# Patient Record
Sex: MALE | Race: WHITE | NOT HISPANIC OR LATINO | Employment: OTHER | URBAN - METROPOLITAN AREA
[De-identification: names, ages, dates, MRNs, and addresses within clinical notes are randomized per-mention and may not be internally consistent; named-entity substitution may affect disease eponyms.]

---

## 2017-03-24 LAB
CHOLEST SERPL-MCNC: NORMAL MG/DL
CHOLEST/HDLC SERPL: NORMAL {RATIO}
HDLC SERPL-MCNC: NORMAL MG/DL
NON-HDL-CHOL (CHOL-HDL) (HISTORICAL): NORMAL
TRIGL SERPL-MCNC: NORMAL MG/DL

## 2017-03-27 ENCOUNTER — ALLSCRIPTS OFFICE VISIT (OUTPATIENT)
Dept: OTHER | Facility: OTHER | Age: 52
End: 2017-03-27

## 2017-04-06 ENCOUNTER — HOSPITAL ENCOUNTER (OUTPATIENT)
Dept: SLEEP CENTER | Facility: CLINIC | Age: 52
Discharge: HOME | End: 2017-04-06
Payer: COMMERCIAL

## 2017-04-06 ENCOUNTER — TRANSCRIBE ORDERS (OUTPATIENT)
Dept: SLEEP CENTER | Facility: CLINIC | Age: 52
End: 2017-04-06

## 2017-04-06 DIAGNOSIS — G47.33 OBSTRUCTIVE SLEEP APNEA (ADULT) (PEDIATRIC): Primary | ICD-10-CM

## 2017-04-06 DIAGNOSIS — G47.33 OBSTRUCTIVE SLEEP APNEA (ADULT) (PEDIATRIC): ICD-10-CM

## 2017-10-17 ENCOUNTER — ALLSCRIPTS OFFICE VISIT (OUTPATIENT)
Dept: OTHER | Facility: OTHER | Age: 52
End: 2017-10-17

## 2017-10-17 DIAGNOSIS — K21.00 GASTRO-ESOPHAGEAL REFLUX DISEASE WITH ESOPHAGITIS: ICD-10-CM

## 2017-10-18 NOTE — PROGRESS NOTES
Assessment  1  Benign essential HTN (401 1) (I10)   2  Hyperglycemia (790 29) (R73 9)   3  Mixed hyperlipidemia (272 2) (E78 2)   4  Gastroesophageal reflux disease with esophagitis (530 11) (K21 0)    Plan  Depression screening    · *VB-Depression Screening; Status:Complete;   Done: 01MXY4358 09:59AM   · *VB-Depression Screening ; every 1 year; Last 06LBN0355; Next 00QTT3446; Status:Active  Gastroesophageal reflux disease with esophagitis    · (1) ALT (SGPT); Status:Active; Requested for:17Oct2017;    · (1) AST (SGOT); Status:Active; Requested for:17Oct2017;    · (1) BASIC METABOLIC PROFILE; Status:Active; Requested for:17Oct2017;    · (1) CBC/ PLT (NO DIFF); Status:Active; Requested for:17Oct2017;    · (Q) LIPID PANEL WITH DIRECT LDL; Status:Active; Requested CSI:[id number];    · Follow-up visit in 4 Months Evaluation and Treatment  Follow-up  Status: Hold For - Scheduling   Requested for: 10XMZ1249    Discussion/Summary  Discussion Summary:   See med list ordered  Counseling Documentation With Imm: The patient was counseled regarding instructions for management,-- risk factor reductions,-- prognosis  Chief Complaint  Chief Complaint Free Text Note Form: pt here for 4 month f/u of HTNstates he had a script for Omeprazole 20 mg but not seeing any orders      History of Present Illness  Hypertension (Follow-Up): Symptoms: denies impaired vision,-- denies dyspnea,-- denies chest pain,-- denies intermittent leg claudication-- and-- denies lower extremity edema  Associated symptoms include no headache-- and-- no memory loss  Home monitoring: The patient is not checking blood pressure at home  Hyperlipidemia (Follow-Up): The patient states his hyperlipidemia has been stable since the last visit  Comorbid Illnesses: hypertension  Symptoms: denies chest pain,-- denies intermittent leg claudication,-- denies muscle pain-- and-- denies muscle weakness   Associated symptoms include no focal neurologic deficits-- and-- no memory loss  Lifestyle: Diet: He does not have a healthy diet  Weight Issues: He has weight concerns  Weight control issues: overweight  Exercise: He does not exercise regularly  Smoking: He does not use tobacco Alcohol: He denies alcohol use  Drug Use: He denies drug use  Medications: the patient is adherent with his medication regimen  -- He denies medication side effects  The patient is doing well with his hyperlipidemia goals  the patient's last LDL was 93 mg/dL  The patient is due for a lipid panel  Gastroesophageal Reflux Disease (Follow-Up): The patient reports no change in the condition  The patient is currently asymptomatic  No associated symptoms are reported  Medications:  the patient is adherent to his medication regimen, but-- he denies medication side effects  Review of Systems  Complete-Male:   Constitutional: No fever or chills, feels well, no tiredness, no recent weight gain or weight loss  Eyes: No complaints of eye pain, no red eyes, no discharge from eyes, no itchy eyes  ENT: no complaints of earache, no hearing loss, no nosebleeds, no nasal discharge, no sore throat, no hoarseness  Cardiovascular: No complaints of slow heart rate, no fast heart rate, no chest pain, no palpitations, no leg claudication, no lower extremity  Respiratory: No complaints of shortness of breath, no wheezing, no cough, no SOB on exertion, no orthopnea or PND  Gastrointestinal: as noted in HPI  Musculoskeletal: No complaints of arthralgia, no myalgias, no joint swelling or stiffness, no limb pain or swelling  Neurological: No compliants of headache, no confusion, no convulsions, no numbness or tingling, no dizziness or fainting, no limb weakness, no difficulty walking  Psychiatric: snoring, but-- Is not suicidal, no sleep disturbances, no anxiety or depression, no change in personality, no emotional problems     Endocrine: No complaints of proptosis, no hot flashes, no muscle weakness, no erectile dysfunction, no deepening of the voice, no feelings of weakness  Hematologic/Lymphatic: No complaints of swollen glands, no swollen glands in the neck, does not bleed easily, no easy bruising  PHQ-9 Depression Scale: Over the past 2 weeks, how often have you been bothered by the following problems? 1 ) Little interest or pleasure in doing things? Not at all    2 ) Feeling down, depressed or hopeless? Not at all    3 ) Trouble falling asleep or sleeping too much? Not at all    4 ) Feeling tired or having little energy? Not at all    5 ) Poor appetite or overeating? Not at all    6 ) Feeling bad about yourself, or that you are a failure, or have let yourself or your family down? Not at all    7 ) Trouble concentrating on things, such as reading a newspaper or watching television? Not at all    8 ) Moving or speaking so slowly that other people could have noticed, or the opposite, moving or speaking faster than usual? Not at all  How difficult have these problems made it for you to do your work, take care of things at home, or get along with people? Not at all  Score 0      Active Problems  1  Benign essential HTN (401 1) (I10)   2  Diverticulosis large intestine w/o perforation or abscess w/o bleeding (562 10) (K57 30)   3  Gastroesophageal reflux disease with esophagitis (530 11) (K21 0)   4  Hyperglycemia (790 29) (R73 9)   5  Mixed hyperlipidemia (272 2) (E78 2)   6  Obstructive sleep apnea (327 23) (G47 33)    Past Medical History  1  History of High cholesterol (272 0) (E78 00)   2  History of asthma (V12 69) (Z87 09)   3  History of esophageal reflux (V12 79) (Z87 19)   4  History of Obstructive sleep apnea (327 23) (G47 33)   5  History of RLQ discomfort (789 03) (R10 31)  Active Problems And Past Medical History Reviewed: The active problems and past medical history were reviewed and updated today  Surgical History  Surgical History Reviewed:    The surgical history was reviewed and updated today  Family History  Mother    1  Family history of hypertension (V17 49) (Z82 49)  Father    2  Family history of malignant neoplasm (V16 9) (Z80 9)  Family History Reviewed: The family history was reviewed and updated today  Social History   · Alcohol use (V49 89) (Z78 9)   · Never a smoker   · No drug use  Social History Reviewed: The social history was reviewed and updated today  Current Meds   1  Atorvastatin Calcium 20 MG Oral Tablet; TAKE 1 TABLET DAILY; Therapy: 61WZF8914 to (Evaluate:11Mar2018)  Requested for: 01Pch8573; Last Rx:73Fgw9700   Ordered  Medication List Reviewed: The medication list was reviewed and updated today  Allergies  1  Seasonal    Vitals  Vital Signs    Recorded: 72KSE6217 09:53AM   Temperature 98 9 F, Tympanic   Heart Rate 60   Systolic 331, LUE, Sitting   Diastolic 82, LUE, Sitting   Height 6 ft 1 in   Weight 252 lb 8 oz   BMI Calculated 33 31   BSA Calculated 2 38   O2 Saturation 98, RA     Physical Exam    Constitutional   General appearance: Abnormal   overweight  Eyes   Conjunctiva and lids: No swelling, erythema, or discharge  Ears, Nose, Mouth, and Throat   External inspection of ears and nose: Normal     Otoscopic examination: Tympanic membrance translucent with normal light reflex  Canals patent without erythema  Oropharynx: Normal with no erythema, edema, exudate or lesions  Pulmonary   Respiratory effort: No increased work of breathing or signs of respiratory distress  Auscultation of lungs: Clear to auscultation, equal breath sounds bilaterally, no wheezes, no rales, no rhonci  Cardiovascular   Auscultation of heart: Normal rate and rhythm, normal S1 and S2, without murmurs  Examination of extremities for edema and/or varicosities: Normal     Carotid pulses: Normal     Abdomen   Abdomen: Non-tender, no masses  Liver and spleen: No hepatomegaly or splenomegaly      Musculoskeletal   Gait and station: Normal  Digits and nails: Normal without clubbing or cyanosis  Skin   Skin and subcutaneous tissue: Normal without rashes or lesions  Neurologic   Cranial nerves: Cranial nerves 2-12 intact  Reflexes: 2+ and symmetric  Sensation: No sensory loss  Psychiatric   Orientation to person, place and time: Normal          Results/Data  *VB-Depression Screening 87GRL9937 09:59AM Sriram Gill     Test Name Result Flag Reference   Depression Scale Result      Depression Screen - Negative For Symptoms       Health Management  Depression screening   *VB-Depression Screening; every 1 year; Last 95JLY9634; Next Due: 80MLH3712; Active  History of Colon cancer screening   COLONOSCOPY; every 5 years; Last 60JVB9738; Next Due: 01KTB7376; Active    Signatures   Electronically signed by :  Raymond Hickman MD; Oct 17 2017 10:21AM EST                       (Author)

## 2018-01-13 VITALS
OXYGEN SATURATION: 98 % | TEMPERATURE: 98.9 F | SYSTOLIC BLOOD PRESSURE: 122 MMHG | HEIGHT: 73 IN | BODY MASS INDEX: 33.46 KG/M2 | DIASTOLIC BLOOD PRESSURE: 82 MMHG | WEIGHT: 252.5 LBS | HEART RATE: 60 BPM

## 2018-01-14 VITALS
OXYGEN SATURATION: 96 % | DIASTOLIC BLOOD PRESSURE: 80 MMHG | SYSTOLIC BLOOD PRESSURE: 128 MMHG | WEIGHT: 257.38 LBS | HEIGHT: 61 IN | TEMPERATURE: 98.1 F | BODY MASS INDEX: 48.6 KG/M2 | HEART RATE: 75 BPM

## 2018-03-08 DIAGNOSIS — E78.5 HYPERLIPIDEMIA, UNSPECIFIED HYPERLIPIDEMIA TYPE: Primary | ICD-10-CM

## 2018-03-08 RX ORDER — ATORVASTATIN CALCIUM 20 MG/1
1 TABLET, FILM COATED ORAL DAILY
COMMUNITY
Start: 2016-02-29 | End: 2018-03-08 | Stop reason: SDUPTHER

## 2018-03-08 RX ORDER — ATORVASTATIN CALCIUM 20 MG/1
20 TABLET, FILM COATED ORAL DAILY
Qty: 30 TABLET | Refills: 0 | Status: SHIPPED | OUTPATIENT
Start: 2018-03-08 | End: 2018-03-16 | Stop reason: SDUPTHER

## 2018-03-16 DIAGNOSIS — E78.5 HYPERLIPIDEMIA, UNSPECIFIED HYPERLIPIDEMIA TYPE: ICD-10-CM

## 2018-03-16 DIAGNOSIS — K21.9 GASTROESOPHAGEAL REFLUX DISEASE, ESOPHAGITIS PRESENCE NOT SPECIFIED: Primary | ICD-10-CM

## 2018-03-16 RX ORDER — OMEPRAZOLE 20 MG/1
20 CAPSULE, DELAYED RELEASE ORAL DAILY
Qty: 90 CAPSULE | Refills: 0 | Status: SHIPPED | OUTPATIENT
Start: 2018-03-16 | End: 2021-11-15 | Stop reason: SDUPTHER

## 2018-03-16 RX ORDER — ATORVASTATIN CALCIUM 20 MG/1
20 TABLET, FILM COATED ORAL DAILY
Qty: 90 TABLET | Refills: 0 | Status: SHIPPED | OUTPATIENT
Start: 2018-03-16 | End: 2018-06-12 | Stop reason: SDUPTHER

## 2018-05-22 ENCOUNTER — OFFICE VISIT (OUTPATIENT)
Dept: SLEEP CENTER | Facility: CLINIC | Age: 53
End: 2018-05-22
Payer: COMMERCIAL

## 2018-05-22 VITALS
WEIGHT: 265 LBS | HEIGHT: 73 IN | HEART RATE: 72 BPM | SYSTOLIC BLOOD PRESSURE: 128 MMHG | DIASTOLIC BLOOD PRESSURE: 72 MMHG | BODY MASS INDEX: 35.12 KG/M2

## 2018-05-22 DIAGNOSIS — E66.9 OBESITY (BMI 35.0-39.9 WITHOUT COMORBIDITY): ICD-10-CM

## 2018-05-22 DIAGNOSIS — G47.33 OBSTRUCTIVE SLEEP APNEA: Primary | ICD-10-CM

## 2018-05-22 PROCEDURE — 99214 OFFICE O/P EST MOD 30 MIN: CPT | Performed by: PSYCHIATRY & NEUROLOGY

## 2018-05-22 NOTE — PATIENT INSTRUCTIONS
1   Continue nasal CPAP at 12 cm water pressure  2  Obtain new supplies to last over the next year  3  Return to the Sleep [address] [place] [address] in approximately 12 months for re-evaluation and dispense new supplies   4    Attempt weight loss as possible using combination of diet and exercise

## 2018-05-22 NOTE — PROGRESS NOTES
Progress Note - Idris [age] y o  male   :1965, MRN: [id number]  2018          Follow Up Evaluation / Problem:     Obstructive Sleep Apnea  Obesity    HPI: Chase Serrato is a 48y o  year old male  He has been using nasal CPAP consistently since originally diagnosed  Last study was a treatment report completed on 2016  He has been using 12 cm of water pressure  Review of Systems      Genitourinary none   Cardiology none   Gastrointestinal none   Neurology awaken with headache   Constitutional none   Integumentary none   Psychiatry none   Musculoskeletal joint pain, muscle aches, legs twitching/jerking and leg cramps   Pulmonary none   ENT throat clearing   Endocrine none   Hematological none       Current Outpatient Prescriptions:     atorvastatin (LIPITOR) 20 mg tablet, Take 1 tablet (20 mg total) by mouth daily, Disp: 90 tablet, Rfl: 0    omeprazole (PriLOSEC) 20 mg delayed release capsule, Take 1 capsule (20 mg total) by mouth daily, Disp: 90 capsule, Rfl: 0    [place] Sleepiness Scale  Sitting and reading: Would never doze  Watching TV: Slight chance of dozing  Sitting, inactive in a public place (e g  a theatre or a meeting): Slight chance of dozing  As a passenger in a car for an hour without a break: Slight chance of dozing  Lying down to rest in the afternoon when circumstances permit: Slight chance of dozing  Sitting and talking to someone: Would never doze  Sitting quietly after a lunch without alcohol: Would never doze  In a car, while stopped for a few minutes in traffic: Would never doze  Total score: 4              Vitals:    18 1000   BP: 128/72   Pulse: 72   Weight: 120 kg (265 lb)   Height: 6' 1" (1 854 m)       Body mass index is 34 96 kg/m²      Neck Circumference: 18 5 (inches)       EPWORTH SLEEPINESS SCORE  Total score: 4      Past History Since Last Sleep Center Visit:     Over the past year he has gained somewhere between 10 and 15 lb  He continues to use nasal CPAP on a consistent basis and reports good results  Unfortunately, new supplies have not been obtained since his last visit  The review of systems and following portions of the patient's history were reviewed and updated as appropriate: allergies, current medications, past family history, past medical history, past social history, past surgical history, and problem list     OBJECTIVE    PAP Pressure: Nasal CPAP set to deliver 12 cm of water pressure  DME Provider: [facility]    Physical Exam:     General Appearance:   Alert, cooperative, no distress, appears stated age, mildly obese     Head:   Normocephalic, without obvious abnormality, atraumatic     Eyes:   PERRL, conjunctiva/corneas clear, EOM's intact          Nose:  Nares normal, septum midline, no drainage or sinus tenderness           Throat:  Lips, teeth and gums normal; tongue normal size and  shape and midline        Neck:  Supple, symmetrical, trachea midline, no adenopathy; Thyroid: No enlargement, tenderness or nodules; no carotid bruit or JVD     Lungs:      Clear to auscultation bilaterally, respirations unlabored     Heart:   Regular rate and rhythm, S1 and S2 normal, no murmur, rub or gallop       Extremities:  Extremities normal, atraumatic, no cyanosis or edema     Pulses:  2+ and symmetric all extremities     Skin:  Skin color, texture, turgor normal, no rashes or lesions       Neurologic:  CNII-XII intact  Normal strength, sensation throughout       ASSESSMENT / PLAN    1  Obstructive sleep apnea  Sleep F/U  - established patient   2  Obesity (BMI 35 0-39 9 without comorbidity)             Counseling / Coordination of Care  Total clinic time spent today 25 minutes  Greater than 50% of total time was spent with the patient and / or family counseling and / or coordination of care       A description of the counseling / coordination of care:     Impressions, Diagnostic results, Prognosis, Instructions for management, Risks and benefits of treatment, Patient and family education, Risk factor reductions and Importance of compliance with treatment    The following instructions have been given to the patient today:    Today we discussed the patient's ongoing use of nasal PAP  He uses the equipment more than 4 hours per night and reports continued clinical benefit  He will continue to use this equipment nightly as tolerated for as may hours as possible  I will also provide a prescription for replacement supplies which will enable the patient to obtain these over the next 12 months  We also reviewed the mechanism of narrowing and closure of the upper airway and the ultimate pathology of Obstructive Sleep Apnea  He will return in 1 year for a routine  re-evaluation  I have encouraged him to contact the [address] if any problems arise prior to that time  Patient Instructions   1  Continue nasal CPAP at 12 cm water pressure  2  Obtain new supplies to last over the next year  3  Return to the [address] in approximately 12 months for re-evaluation and dispense new supplies   4    Attempt weight loss as possible using combination of diet and exercise        DO Kevin Mueller 15

## 2018-06-11 ENCOUNTER — OFFICE VISIT (OUTPATIENT)
Dept: INTERNAL MEDICINE CLINIC | Facility: CLINIC | Age: 53
End: 2018-06-11
Payer: COMMERCIAL

## 2018-06-11 VITALS
TEMPERATURE: 97.8 F | HEART RATE: 70 BPM | SYSTOLIC BLOOD PRESSURE: 120 MMHG | RESPIRATION RATE: 20 BRPM | BODY MASS INDEX: 34.91 KG/M2 | WEIGHT: 263.4 LBS | HEIGHT: 73 IN | DIASTOLIC BLOOD PRESSURE: 86 MMHG | OXYGEN SATURATION: 95 %

## 2018-06-11 DIAGNOSIS — Z12.11 SCREENING FOR COLON CANCER: Primary | ICD-10-CM

## 2018-06-11 DIAGNOSIS — E78.2 MIXED HYPERLIPIDEMIA: ICD-10-CM

## 2018-06-11 DIAGNOSIS — E66.9 OBESITY (BMI 35.0-39.9 WITHOUT COMORBIDITY): ICD-10-CM

## 2018-06-11 DIAGNOSIS — G47.33 OBSTRUCTIVE SLEEP APNEA: ICD-10-CM

## 2018-06-11 PROCEDURE — 99214 OFFICE O/P EST MOD 30 MIN: CPT | Performed by: INTERNAL MEDICINE

## 2018-06-11 NOTE — PROGRESS NOTES
Assessment/Plan:      1  Hyperlipidemia   lipid profile is in good range  Continue with atorvastatin 20 mg daily     2  Obstructive sleep apnea   doing well on CPAP  Continue CPAP machine  3  Obesity   again advised for diet exercise and lower the calorie intake  Diagnoses and all orders for this visit:    Screening for colon cancer  -     Ambulatory referral to Gastroenterology; Future    Obstructive sleep apnea    Obesity (BMI 35 0-39 9 without comorbidity)    Mixed hyperlipidemia          Subjective:          Patient ID: Jacinda Huertas is a 48 y o  male  Patient is here for regular follow-up to the office  Denied any new complaints  Labs were drawn last week  The following portions of the patient's history were reviewed and updated as appropriate: allergies, current medications, past family history, past medical history, past social history, past surgical history and problem list     Review of Systems   Constitutional: Negative for fatigue and fever  HENT: Negative for congestion, ear discharge, ear pain, postnasal drip, sinus pressure, sore throat, tinnitus and trouble swallowing  Eyes: Negative for discharge, itching and visual disturbance  Respiratory: Negative for cough and shortness of breath  Cardiovascular: Negative for chest pain and palpitations  Gastrointestinal: Negative for abdominal pain, diarrhea, nausea and vomiting  Endocrine: Negative for cold intolerance and polyuria  Genitourinary: Negative for difficulty urinating, dysuria and urgency  Musculoskeletal: Negative for arthralgias and neck pain  Skin: Negative for rash  Allergic/Immunologic: Negative for environmental allergies  Neurological: Negative for dizziness, weakness and headaches  Psychiatric/Behavioral: Negative for agitation and behavioral problems  The patient is not nervous/anxious            Past Medical History:   Diagnosis Date    High cholesterol     Obstructive sleep apnea Last Assessed:6/8/16         Current Outpatient Prescriptions:     atorvastatin (LIPITOR) 20 mg tablet, Take 1 tablet (20 mg total) by mouth daily, Disp: 90 tablet, Rfl: 0    omeprazole (PriLOSEC) 20 mg delayed release capsule, Take 1 capsule (20 mg total) by mouth daily, Disp: 90 capsule, Rfl: 0    Allergies   Allergen Reactions    Seasonal Ic  [Cholestatin]        Social History   History reviewed  No pertinent surgical history  Family History   Problem Relation Age of Onset    Hypertension Mother     Cancer Father        Objective:  /86 (BP Location: Left arm, Patient Position: Sitting, Cuff Size: Large)   Pulse 70   Temp 97 8 °F (36 6 °C) (Oral)   Resp 20   Ht 6' 1" (1 854 m)   Wt 119 kg (263 lb 6 4 oz)   SpO2 95% Comment: room air  BMI 34 75 kg/m²   Body mass index is 34 75 kg/m²  Physical Exam   Constitutional: He appears well-developed  HENT:   Head: Normocephalic  Right Ear: External ear normal    Left Ear: External ear normal    Mouth/Throat: Oropharynx is clear and moist    Eyes: Pupils are equal, round, and reactive to light  No scleral icterus  Neck: Normal range of motion  Neck supple  No tracheal deviation present  No thyromegaly present  Cardiovascular: Normal rate, regular rhythm and normal heart sounds  No murmur heard  Pulmonary/Chest: Effort normal and breath sounds normal  No respiratory distress  He exhibits no tenderness  Abdominal: Soft  Bowel sounds are normal  He exhibits no mass  There is no tenderness  Musculoskeletal: Normal range of motion  Lymphadenopathy:     He has no cervical adenopathy  Neurological: He is alert  No cranial nerve deficit  Skin: Skin is warm  Psychiatric: He has a normal mood and affect   His behavior is normal

## 2018-06-12 DIAGNOSIS — E78.5 HYPERLIPIDEMIA, UNSPECIFIED HYPERLIPIDEMIA TYPE: ICD-10-CM

## 2018-06-12 RX ORDER — ATORVASTATIN CALCIUM 20 MG/1
20 TABLET, FILM COATED ORAL DAILY
Qty: 90 TABLET | Refills: 1 | Status: SHIPPED | OUTPATIENT
Start: 2018-06-12 | End: 2018-11-07 | Stop reason: SDUPTHER

## 2018-08-01 ENCOUNTER — OFFICE VISIT (OUTPATIENT)
Dept: URGENT CARE | Age: 53
End: 2018-08-01
Payer: COMMERCIAL

## 2018-08-01 VITALS
SYSTOLIC BLOOD PRESSURE: 120 MMHG | HEIGHT: 73 IN | DIASTOLIC BLOOD PRESSURE: 77 MMHG | OXYGEN SATURATION: 97 % | TEMPERATURE: 98.1 F | WEIGHT: 261.2 LBS | BODY MASS INDEX: 34.62 KG/M2 | RESPIRATION RATE: 16 BRPM | HEART RATE: 68 BPM

## 2018-08-01 DIAGNOSIS — S01.81XA LACERATION OF FOREHEAD, INITIAL ENCOUNTER: Primary | ICD-10-CM

## 2018-08-01 PROCEDURE — 99213 OFFICE O/P EST LOW 20 MIN: CPT | Performed by: PHYSICIAN ASSISTANT

## 2018-08-01 NOTE — PATIENT INSTRUCTIONS
Keep wound covered for 24 hours then change bandage 2 times per day  Keep clean, dry and apply topical antibiotic ointment  Tylenol or Ibuprofen for pain as needed  Have wound checked in 1-2 days  Watch for signs of infection  Follow up with PCP in 3-5 days  Go to ED if symptoms worsen    Laceration   WHAT YOU NEED TO KNOW:   A laceration is an injury to the skin and the soft tissue underneath it  Lacerations happen when you are cut or hit by something  They can happen anywhere on the body  DISCHARGE INSTRUCTIONS:   Return to the emergency department if:   · You have heavy bleeding or bleeding that does not stop after 10 minutes of holding firm, direct pressure over the wound  · Your wound opens up  Contact your healthcare provider if:   · You have a fever or chills  · Your laceration is red, warm, or swollen  · You have red streaks on your skin coming from your wound  · You have white or yellow drainage from the wound that smells bad  · You have pain that gets worse, even after treatment  · You have questions or concerns about your condition or care  Medicines:   · Prescription pain medicine  may be given  Ask how to take this medicine safely  · Antibiotics  help treat or prevent a bacterial infection  · Take your medicine as directed  Contact your healthcare provider if you think your medicine is not helping or if you have side effects  Tell him or her if you are allergic to any medicine  Keep a list of the medicines, vitamins, and herbs you take  Include the amounts, and when and why you take them  Bring the list or the pill bottles to follow-up visits  Carry your medicine list with you in case of an emergency  Care for your wound as directed:   · Do not get your wound wet  until your healthcare provider says it is okay  Do not soak your wound in water  Do not go swimming until your healthcare provider says it is okay  Carefully wash the wound with soap and water   Gently pat the area dry or allow it to air dry  · Change your bandages  when they get wet, dirty, or after washing  Apply new, clean bandages as directed  Do not apply elastic bandages or tape too tight  Do not put powders or lotions over your incision  · Apply antibiotic ointment as directed  Your healthcare provider may give you antibiotic ointment to put over your wound if you have stitches  If you have strips of tape over your incision, let them dry up and fall off on their own  If they do not fall off within 14 days, gently remove them  If you have glue over your wound, do not remove or pick at it  If your glue comes off, do not replace it with glue that you have at home  · Check your wound every day for signs of infection such as swelling, redness, or pus  Self-care:   · Apply ice  on your wound for 15 to 20 minutes every hour or as directed  Use an ice pack, or put crushed ice in a plastic bag  Cover it with a towel  Ice helps prevent tissue damage and decreases swelling and pain  · Use a splint as directed  A splint will decrease movement and stress on your wound  It may help it heal faster  A splint may be used for lacerations over joints or areas of your body that bend  Ask your healthcare provider how to apply and remove a splint  · Decrease scarring of your wound  by applying ointments as directed  Do not apply ointments until your healthcare provider says it is okay  You may need to wait until your wound is healed  Ask which ointment to buy and how often to use it  After your wound is healed, use sunscreen over the area when you are out in the sun  You should do this for at least 6 months to 1 year after your injury  Follow up with your healthcare provider as directed: You may need to follow up in 24 to 48 hours to have your wound checked for infection  You will need to return in 3 to 14 days if you have stitches or staples so they can be removed   Care for your wound as directed to prevent infection and help it heal  Write down your questions so you remember to ask them during your visits  © 2017 [employer] [employer] Information is for End User's use only and may not be sold, redistributed or otherwise used for commercial purposes  All illustrations and images included in CareNotes® are the copyrighted property of [employer]  or [employer]  The above information is an  only  It is not intended as medical advice for individual conditions or treatments  Talk to your doctor, nurse or pharmacist before following any medical regimen to see if it is safe and effective for you

## 2018-11-07 ENCOUNTER — OFFICE VISIT (OUTPATIENT)
Dept: INTERNAL MEDICINE CLINIC | Facility: CLINIC | Age: 53
End: 2018-11-07
Payer: COMMERCIAL

## 2018-11-07 VITALS
SYSTOLIC BLOOD PRESSURE: 130 MMHG | TEMPERATURE: 97.9 F | DIASTOLIC BLOOD PRESSURE: 84 MMHG | WEIGHT: 262.8 LBS | HEIGHT: 72 IN | OXYGEN SATURATION: 96 % | HEART RATE: 88 BPM | BODY MASS INDEX: 35.59 KG/M2

## 2018-11-07 DIAGNOSIS — G47.33 OBSTRUCTIVE SLEEP APNEA: Primary | ICD-10-CM

## 2018-11-07 DIAGNOSIS — E78.2 MIXED HYPERLIPIDEMIA: ICD-10-CM

## 2018-11-07 DIAGNOSIS — E78.5 HYPERLIPIDEMIA, UNSPECIFIED HYPERLIPIDEMIA TYPE: ICD-10-CM

## 2018-11-07 DIAGNOSIS — K21.9 GASTROESOPHAGEAL REFLUX DISEASE WITHOUT ESOPHAGITIS: ICD-10-CM

## 2018-11-07 PROCEDURE — 99214 OFFICE O/P EST MOD 30 MIN: CPT | Performed by: INTERNAL MEDICINE

## 2018-11-07 PROCEDURE — 1036F TOBACCO NON-USER: CPT | Performed by: INTERNAL MEDICINE

## 2018-11-07 PROCEDURE — 3008F BODY MASS INDEX DOCD: CPT | Performed by: INTERNAL MEDICINE

## 2018-11-07 RX ORDER — ATORVASTATIN CALCIUM 20 MG/1
20 TABLET, FILM COATED ORAL DAILY
Qty: 90 TABLET | Refills: 1 | Status: SHIPPED | OUTPATIENT
Start: 2018-11-07 | End: 2019-05-28 | Stop reason: SDUPTHER

## 2018-11-07 NOTE — PROGRESS NOTES
Assessment/Plan:    1  Hyperlipidemia  Will continue with atorvastatin 20 mg daily  Will check lipid profile before next visit  2  Gastroesophageal reflux disease  Doing well on omeprazole 20 mg daily  He did try to stop it but then have symptoms of reflux  So will continue it  3  Obstructive sleep apnea  Continue with the CPAP      Diagnoses and all orders for this visit:    Obstructive sleep apnea    Hyperlipidemia, unspecified hyperlipidemia type  -     atorvastatin (LIPITOR) 20 mg tablet; Take 1 tablet (20 mg total) by mouth daily  -     Lipid Panel with Direct LDL reflex; Future    Mixed hyperlipidemia    Gastroesophageal reflux disease without esophagitis          Subjective:          Patient ID: Dashawn Toney is a 48 y o  male  Hyperlipidemia   This is a chronic problem  The problem is controlled  Recent lipid tests were reviewed and are normal  Pertinent negatives include no chest pain or shortness of breath  Current antihyperlipidemic treatment includes statins  The current treatment provides significant improvement of lipids  Compliance problems include adherence to exercise  Risk factors for coronary artery disease include dyslipidemia, male sex and obesity  The following portions of the patient's history were reviewed and updated as appropriate: allergies, current medications, past family history, past medical history, past social history, past surgical history and problem list     Review of Systems   Constitutional: Negative for fatigue and fever  HENT: Negative for congestion, ear discharge, ear pain, postnasal drip, sinus pressure, sore throat, tinnitus and trouble swallowing  Eyes: Negative for discharge, itching and visual disturbance  Respiratory: Negative for cough and shortness of breath  Cardiovascular: Negative for chest pain and palpitations  Gastrointestinal: Negative for abdominal pain, diarrhea, nausea and vomiting     Endocrine: Negative for cold intolerance and polyuria  Genitourinary: Negative for difficulty urinating, dysuria and urgency  Musculoskeletal: Negative for arthralgias and neck pain  Skin: Negative for rash  Allergic/Immunologic: Negative for environmental allergies  Neurological: Negative for dizziness, weakness and headaches  Psychiatric/Behavioral: Negative for agitation and behavioral problems  The patient is not nervous/anxious  Past Medical History:   Diagnosis Date    High cholesterol     Obstructive sleep apnea     Last Assessed:6/8/16         Current Outpatient Prescriptions:     atorvastatin (LIPITOR) 20 mg tablet, Take 1 tablet (20 mg total) by mouth daily, Disp: 90 tablet, Rfl: 1    omeprazole (PriLOSEC) 20 mg delayed release capsule, Take 1 capsule (20 mg total) by mouth daily, Disp: 90 capsule, Rfl: 0    Allergies   Allergen Reactions    Seasonal Ic  [Cholestatin]        Social History   History reviewed  No pertinent surgical history  Family History   Problem Relation Age of Onset    Hypertension Mother     Cancer Father        Objective:  /84 (BP Location: Left arm, Patient Position: Sitting, Cuff Size: Large)   Pulse 88   Temp 97 9 °F (36 6 °C) (Oral)   Ht 6' (1 829 m)   Wt 119 kg (262 lb 12 8 oz)   SpO2 96% Comment: room air  BMI 35 64 kg/m²   Body mass index is 35 64 kg/m²  Physical Exam   Constitutional: He appears well-developed  HENT:   Head: Normocephalic  Right Ear: External ear normal    Left Ear: External ear normal    Mouth/Throat: Oropharynx is clear and moist    Eyes: Pupils are equal, round, and reactive to light  No scleral icterus  Neck: Normal range of motion  Neck supple  No tracheal deviation present  No thyromegaly present  Cardiovascular: Normal rate, regular rhythm and normal heart sounds  Pulmonary/Chest: Effort normal and breath sounds normal  No respiratory distress  He exhibits no tenderness  Abdominal: Soft  Bowel sounds are normal  He exhibits no mass   There is no tenderness  Musculoskeletal: Normal range of motion  He exhibits no edema  Lymphadenopathy:     He has no cervical adenopathy  Neurological: He is alert  No cranial nerve deficit  Skin: Skin is warm  Psychiatric: He has a normal mood and affect

## 2019-05-21 ENCOUNTER — CLINICAL SUPPORT (OUTPATIENT)
Dept: INTERNAL MEDICINE CLINIC | Facility: CLINIC | Age: 54
End: 2019-05-21
Payer: COMMERCIAL

## 2019-05-21 DIAGNOSIS — E78.5 HYPERLIPIDEMIA, UNSPECIFIED HYPERLIPIDEMIA TYPE: Primary | ICD-10-CM

## 2019-05-21 LAB
CHOLEST SERPL-MCNC: 104 MG/DL (ref 50–200)
HDLC SERPL-MCNC: 39 MG/DL (ref 40–60)
LDLC SERPL CALC-MCNC: 54 MG/DL (ref 0–100)
TRIGL SERPL-MCNC: 56 MG/DL

## 2019-05-21 PROCEDURE — 80061 LIPID PANEL: CPT | Performed by: INTERNAL MEDICINE

## 2019-05-21 PROCEDURE — 36415 COLL VENOUS BLD VENIPUNCTURE: CPT

## 2019-05-23 ENCOUNTER — OFFICE VISIT (OUTPATIENT)
Dept: SLEEP CENTER | Facility: CLINIC | Age: 54
End: 2019-05-23
Payer: COMMERCIAL

## 2019-05-23 VITALS
HEART RATE: 74 BPM | DIASTOLIC BLOOD PRESSURE: 72 MMHG | SYSTOLIC BLOOD PRESSURE: 120 MMHG | WEIGHT: 249 LBS | BODY MASS INDEX: 33 KG/M2 | HEIGHT: 73 IN

## 2019-05-23 DIAGNOSIS — E66.9 OBESITY (BMI 35.0-39.9 WITHOUT COMORBIDITY): ICD-10-CM

## 2019-05-23 DIAGNOSIS — G47.33 OBSTRUCTIVE SLEEP APNEA: Primary | ICD-10-CM

## 2019-05-23 PROCEDURE — 99214 OFFICE O/P EST MOD 30 MIN: CPT | Performed by: PSYCHIATRY & NEUROLOGY

## 2019-05-28 ENCOUNTER — OFFICE VISIT (OUTPATIENT)
Dept: INTERNAL MEDICINE CLINIC | Facility: CLINIC | Age: 54
End: 2019-05-28
Payer: COMMERCIAL

## 2019-05-28 VITALS
OXYGEN SATURATION: 97 % | SYSTOLIC BLOOD PRESSURE: 110 MMHG | WEIGHT: 249.4 LBS | DIASTOLIC BLOOD PRESSURE: 78 MMHG | HEART RATE: 63 BPM | HEIGHT: 73 IN | TEMPERATURE: 97.8 F | BODY MASS INDEX: 33.05 KG/M2

## 2019-05-28 DIAGNOSIS — Z12.5 PROSTATE CANCER SCREENING: ICD-10-CM

## 2019-05-28 DIAGNOSIS — G47.33 OBSTRUCTIVE SLEEP APNEA: ICD-10-CM

## 2019-05-28 DIAGNOSIS — E66.9 OBESITY (BMI 35.0-39.9 WITHOUT COMORBIDITY): Primary | ICD-10-CM

## 2019-05-28 DIAGNOSIS — E78.5 HYPERLIPIDEMIA, UNSPECIFIED HYPERLIPIDEMIA TYPE: ICD-10-CM

## 2019-05-28 DIAGNOSIS — K21.9 GASTROESOPHAGEAL REFLUX DISEASE, ESOPHAGITIS PRESENCE NOT SPECIFIED: ICD-10-CM

## 2019-05-28 PROCEDURE — 3008F BODY MASS INDEX DOCD: CPT | Performed by: INTERNAL MEDICINE

## 2019-05-28 PROCEDURE — 99214 OFFICE O/P EST MOD 30 MIN: CPT | Performed by: INTERNAL MEDICINE

## 2019-05-28 PROCEDURE — 1036F TOBACCO NON-USER: CPT | Performed by: INTERNAL MEDICINE

## 2019-05-28 RX ORDER — ATORVASTATIN CALCIUM 20 MG/1
20 TABLET, FILM COATED ORAL DAILY
Qty: 90 TABLET | Refills: 1 | Status: SHIPPED | OUTPATIENT
Start: 2019-05-28 | End: 2019-11-26 | Stop reason: SDUPTHER

## 2019-11-18 DIAGNOSIS — E78.5 HYPERLIPIDEMIA, UNSPECIFIED HYPERLIPIDEMIA TYPE: ICD-10-CM

## 2019-11-18 RX ORDER — ATORVASTATIN CALCIUM 20 MG/1
TABLET, FILM COATED ORAL
Qty: 90 TABLET | Refills: 1 | OUTPATIENT
Start: 2019-11-18

## 2019-11-22 ENCOUNTER — CLINICAL SUPPORT (OUTPATIENT)
Dept: INTERNAL MEDICINE CLINIC | Facility: CLINIC | Age: 54
End: 2019-11-22

## 2019-11-22 DIAGNOSIS — E78.2 MIXED HYPERLIPIDEMIA: ICD-10-CM

## 2019-11-22 DIAGNOSIS — Z12.5 PROSTATE CANCER SCREENING: ICD-10-CM

## 2019-11-22 DIAGNOSIS — K21.9 GASTROESOPHAGEAL REFLUX DISEASE WITHOUT ESOPHAGITIS: Primary | ICD-10-CM

## 2019-11-23 LAB
BASOPHILS # BLD AUTO: 58 CELLS/UL (ref 0–200)
BASOPHILS NFR BLD AUTO: 1.1 %
BUN SERPL-MCNC: 20 MG/DL (ref 7–25)
BUN/CREAT SERPL: ABNORMAL (CALC) (ref 6–22)
CALCIUM SERPL-MCNC: 9.8 MG/DL (ref 8.6–10.3)
CHLORIDE SERPL-SCNC: 105 MMOL/L (ref 98–110)
CHOLEST SERPL-MCNC: 144 MG/DL
CHOLEST/HDLC SERPL: 3.3 (CALC)
CO2 SERPL-SCNC: 27 MMOL/L (ref 20–32)
CREAT SERPL-MCNC: 0.95 MG/DL (ref 0.7–1.33)
EOSINOPHIL # BLD AUTO: 101 CELLS/UL (ref 15–500)
EOSINOPHIL NFR BLD AUTO: 1.9 %
ERYTHROCYTE [DISTWIDTH] IN BLOOD BY AUTOMATED COUNT: 12.1 % (ref 11–15)
GLUCOSE SERPL-MCNC: 102 MG/DL (ref 65–99)
HCT VFR BLD AUTO: 43.7 % (ref 38.5–50)
HDLC SERPL-MCNC: 44 MG/DL
HGB BLD-MCNC: 14.8 G/DL (ref 13.2–17.1)
LDLC SERPL CALC-MCNC: 83 MG/DL (CALC)
LYMPHOCYTES # BLD AUTO: 1701 CELLS/UL (ref 850–3900)
LYMPHOCYTES NFR BLD AUTO: 32.1 %
MCH RBC QN AUTO: 30.3 PG (ref 27–33)
MCHC RBC AUTO-ENTMCNC: 33.9 G/DL (ref 32–36)
MCV RBC AUTO: 89.5 FL (ref 80–100)
MONOCYTES # BLD AUTO: 610 CELLS/UL (ref 200–950)
MONOCYTES NFR BLD AUTO: 11.5 %
NEUTROPHILS # BLD AUTO: 2830 CELLS/UL (ref 1500–7800)
NEUTROPHILS NFR BLD AUTO: 53.4 %
NONHDLC SERPL-MCNC: 100 MG/DL (CALC)
PLATELET # BLD AUTO: 229 THOUSAND/UL (ref 140–400)
PMV BLD REES-ECKER: 10.1 FL (ref 7.5–12.5)
POTASSIUM SERPL-SCNC: 5 MMOL/L (ref 3.5–5.3)
PSA SERPL-MCNC: 0.2 NG/ML
RBC # BLD AUTO: 4.88 MILLION/UL (ref 4.2–5.8)
SL AMB EGFR AFRICAN AMERICAN: 105 ML/MIN/1.73M2
SL AMB EGFR NON AFRICAN AMERICAN: 90 ML/MIN/1.73M2
SODIUM SERPL-SCNC: 142 MMOL/L (ref 135–146)
TRIGL SERPL-MCNC: 77 MG/DL
WBC # BLD AUTO: 5.3 THOUSAND/UL (ref 3.8–10.8)

## 2019-11-26 ENCOUNTER — OFFICE VISIT (OUTPATIENT)
Dept: INTERNAL MEDICINE CLINIC | Facility: CLINIC | Age: 54
End: 2019-11-26
Payer: COMMERCIAL

## 2019-11-26 VITALS
TEMPERATURE: 98.3 F | DIASTOLIC BLOOD PRESSURE: 76 MMHG | HEART RATE: 64 BPM | WEIGHT: 268.2 LBS | BODY MASS INDEX: 35.54 KG/M2 | OXYGEN SATURATION: 97 % | SYSTOLIC BLOOD PRESSURE: 110 MMHG | HEIGHT: 73 IN

## 2019-11-26 DIAGNOSIS — E78.5 HYPERLIPIDEMIA, UNSPECIFIED HYPERLIPIDEMIA TYPE: ICD-10-CM

## 2019-11-26 DIAGNOSIS — E78.2 MIXED HYPERLIPIDEMIA: ICD-10-CM

## 2019-11-26 DIAGNOSIS — R73.9 HYPERGLYCEMIA: ICD-10-CM

## 2019-11-26 DIAGNOSIS — E66.9 OBESITY (BMI 35.0-39.9 WITHOUT COMORBIDITY): ICD-10-CM

## 2019-11-26 DIAGNOSIS — K21.9 GASTROESOPHAGEAL REFLUX DISEASE WITHOUT ESOPHAGITIS: ICD-10-CM

## 2019-11-26 DIAGNOSIS — Z11.59 NEED FOR HEPATITIS C SCREENING TEST: Primary | ICD-10-CM

## 2019-11-26 DIAGNOSIS — G47.33 OBSTRUCTIVE SLEEP APNEA: ICD-10-CM

## 2019-11-26 PROCEDURE — 99214 OFFICE O/P EST MOD 30 MIN: CPT | Performed by: INTERNAL MEDICINE

## 2019-11-26 PROCEDURE — 1036F TOBACCO NON-USER: CPT | Performed by: INTERNAL MEDICINE

## 2019-11-26 RX ORDER — ATORVASTATIN CALCIUM 20 MG/1
20 TABLET, FILM COATED ORAL DAILY
Qty: 90 TABLET | Refills: 1 | Status: SHIPPED | OUTPATIENT
Start: 2019-11-26 | End: 2020-06-23 | Stop reason: SDUPTHER

## 2019-11-26 NOTE — PROGRESS NOTES
Assessment/Plan:    1  Hyperlipidemia  Lipid profile is in excellent range  Will continue with present dose of atorvastatin 20 mg daily  Continue with low-fat diet exercise and weight loss  2  Hyperglycemia  Will request hemoglobin A1c before next visit  Advised for low sugar/low-carbohydrate diet exercise and weight loss  3  Gastroesophageal reflux disease  Stable on omeprazole 20 mg daily  Advised to taper off medicine and see how does without medicine  Continue with better diet control  Diagnoses and all orders for this visit:    Need for hepatitis C screening test  -     Hepatitis C antibody    Hyperlipidemia, unspecified hyperlipidemia type  -     atorvastatin (LIPITOR) 20 mg tablet; Take 1 tablet (20 mg total) by mouth daily    Gastroesophageal reflux disease without esophagitis    Obstructive sleep apnea    Obesity (BMI 35 0-39 9 without comorbidity)    Mixed hyperlipidemia    Hyperglycemia  -     Hemoglobin A1C; Future               Subjective:          Patient ID: Romelia Castaneda is a 47 y o  male  Patient with past medical history of GERD and hyperlipidemia is here for follow up on blood test results  He has no complaints at this time  He denies any new muscle pains but he states 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  He denies any heartburn symptoms since taking his omeprazole  He has been compliant with all his medications  He states he gets good exercise from his construction job  He states his diet has not been the best since he started to cook for himself  He would like to start losing some weight  He has SAADIA and he states he has been using his CPAP machine every night  He is curious about using the aspire machine that he saw on TV         The following portions of the patient's history were reviewed and updated as appropriate: allergies, current medications, past family history, past medical history, past social history, past surgical history and problem list     Review of Systems   Constitutional: Negative for appetite change, chills, fever and unexpected weight change  HENT: Positive for congestion  Negative for hearing loss, rhinorrhea, sinus pressure and sore throat  Eyes: Negative for pain and visual disturbance  Respiratory: Positive for apnea  Negative for shortness of breath and wheezing  Cardiovascular: Negative for chest pain  Gastrointestinal: Negative for abdominal pain, blood in stool, diarrhea and vomiting  Genitourinary: Positive for frequency  Negative for difficulty urinating, dysuria and hematuria  Musculoskeletal: Positive for back pain and myalgias  Negative for neck pain and neck stiffness  Neurological: Negative for weakness, numbness and headaches  Psychiatric/Behavioral: Negative for behavioral problems, hallucinations and sleep disturbance  The patient is not nervous/anxious  Past Medical History:   Diagnosis Date    High cholesterol     Obstructive sleep apnea     Last Assessed:6/8/16         Current Outpatient Medications:     atorvastatin (LIPITOR) 20 mg tablet, Take 1 tablet (20 mg total) by mouth daily, Disp: 90 tablet, Rfl: 1    omeprazole (PriLOSEC) 20 mg delayed release capsule, Take 1 capsule (20 mg total) by mouth daily, Disp: 90 capsule, Rfl: 0    Allergies   Allergen Reactions    Seasonal Ic  [Cholestatin]        Social History   History reviewed  No pertinent surgical history  Family History   Problem Relation Age of Onset    Hypertension Mother     Cancer Father        Objective:  /76 (BP Location: Right arm, Patient Position: Sitting, Cuff Size: Large)   Pulse 64   Temp 98 3 °F (36 8 °C) (Oral)   Ht 6' 1" (1 854 m)   Wt 122 kg (268 lb 3 2 oz)   SpO2 97%   BMI 35 38 kg/m²   Body mass index is 35 38 kg/m²  Physical Exam   Constitutional: He is oriented to person, place, and time  He appears well-developed and well-nourished  No distress     HENT:   Head: Normocephalic and atraumatic  Right Ear: External ear normal    Left Ear: External ear normal    Mouth/Throat: Oropharynx is clear and moist  No oropharyngeal exudate  Eyes: Pupils are equal, round, and reactive to light  Right eye exhibits no discharge  Left eye exhibits no discharge  No scleral icterus  Neck: No JVD present  No thyromegaly present  Cardiovascular: Normal rate, regular rhythm, normal heart sounds and intact distal pulses  No murmur heard  Pulmonary/Chest: Effort normal and breath sounds normal  No stridor  No respiratory distress  He has no wheezes  He has no rales  Abdominal: Soft  Bowel sounds are normal  He exhibits no distension and no mass  There is no tenderness  Musculoskeletal: He exhibits no edema or deformity  Lymphadenopathy:     He has no cervical adenopathy  Neurological: He is alert and oriented to person, place, and time  No cranial nerve deficit  Coordination normal    Skin: Skin is warm  Capillary refill takes less than 2 seconds  No rash noted  No erythema  Psychiatric: He has a normal mood and affect   His behavior is normal  Thought content normal

## 2020-05-22 DIAGNOSIS — E78.5 HYPERLIPIDEMIA, UNSPECIFIED HYPERLIPIDEMIA TYPE: ICD-10-CM

## 2020-05-22 RX ORDER — ATORVASTATIN CALCIUM 20 MG/1
TABLET, FILM COATED ORAL
Qty: 90 TABLET | Refills: 1 | OUTPATIENT
Start: 2020-05-22

## 2020-06-23 DIAGNOSIS — E78.5 HYPERLIPIDEMIA, UNSPECIFIED HYPERLIPIDEMIA TYPE: ICD-10-CM

## 2020-06-23 RX ORDER — ATORVASTATIN CALCIUM 20 MG/1
20 TABLET, FILM COATED ORAL DAILY
Qty: 90 TABLET | Refills: 1 | Status: SHIPPED | OUTPATIENT
Start: 2020-06-23 | End: 2021-02-12 | Stop reason: SDUPTHER

## 2020-07-16 ENCOUNTER — TELEPHONE (OUTPATIENT)
Dept: INTERNAL MEDICINE CLINIC | Age: 55
End: 2020-07-16

## 2020-12-15 DIAGNOSIS — E78.5 HYPERLIPIDEMIA, UNSPECIFIED HYPERLIPIDEMIA TYPE: ICD-10-CM

## 2020-12-15 RX ORDER — ATORVASTATIN CALCIUM 20 MG/1
TABLET, FILM COATED ORAL
Qty: 90 TABLET | Refills: 1 | OUTPATIENT
Start: 2020-12-15

## 2020-12-15 NOTE — TELEPHONE ENCOUNTER
Patient called because he needs a refill on his atorvastatin to be sent to [facility] in [place] for a 90 day supply  He stated that he will not come into the office for an appointment right now because of covid  I offered to do a virtual for him and he refused that as well

## 2021-02-12 DIAGNOSIS — E78.5 HYPERLIPIDEMIA, UNSPECIFIED HYPERLIPIDEMIA TYPE: ICD-10-CM

## 2021-02-12 RX ORDER — ATORVASTATIN CALCIUM 20 MG/1
20 TABLET, FILM COATED ORAL DAILY
Qty: 90 TABLET | Refills: 0 | Status: SHIPPED | OUTPATIENT
Start: 2021-02-12 | End: 2021-05-12 | Stop reason: SDUPTHER

## 2021-02-12 NOTE — TELEPHONE ENCOUNTER
Not seen since 2019  Wanted to see if patient was willing to do virtual in office visit  Refused before

## 2021-02-12 NOTE — TELEPHONE ENCOUNTER
patient has pending appt 3/3 in [place] office  Was not in to see you due to insurance issue  Now we take insurance

## 2021-03-03 ENCOUNTER — OFFICE VISIT (OUTPATIENT)
Dept: INTERNAL MEDICINE CLINIC | Facility: CLINIC | Age: 56
End: 2021-03-03
Payer: COMMERCIAL

## 2021-03-03 VITALS
BODY MASS INDEX: 36.71 KG/M2 | WEIGHT: 277 LBS | DIASTOLIC BLOOD PRESSURE: 84 MMHG | HEIGHT: 73 IN | TEMPERATURE: 98.5 F | SYSTOLIC BLOOD PRESSURE: 128 MMHG | OXYGEN SATURATION: 98 % | HEART RATE: 65 BPM

## 2021-03-03 DIAGNOSIS — R73.9 HYPERGLYCEMIA: ICD-10-CM

## 2021-03-03 DIAGNOSIS — G47.33 OBSTRUCTIVE SLEEP APNEA: ICD-10-CM

## 2021-03-03 DIAGNOSIS — K21.9 GASTROESOPHAGEAL REFLUX DISEASE WITHOUT ESOPHAGITIS: ICD-10-CM

## 2021-03-03 DIAGNOSIS — E78.2 MIXED HYPERLIPIDEMIA: ICD-10-CM

## 2021-03-03 DIAGNOSIS — Z12.5 PROSTATE CANCER SCREENING: ICD-10-CM

## 2021-03-03 DIAGNOSIS — E66.9 OBESITY (BMI 35.0-39.9 WITHOUT COMORBIDITY): ICD-10-CM

## 2021-03-03 DIAGNOSIS — Z11.59 NEED FOR HEPATITIS C SCREENING TEST: Primary | ICD-10-CM

## 2021-03-03 PROCEDURE — 99214 OFFICE O/P EST MOD 30 MIN: CPT | Performed by: INTERNAL MEDICINE

## 2021-03-03 PROCEDURE — 3725F SCREEN DEPRESSION PERFORMED: CPT | Performed by: INTERNAL MEDICINE

## 2021-03-03 PROCEDURE — 3008F BODY MASS INDEX DOCD: CPT | Performed by: INTERNAL MEDICINE

## 2021-03-03 PROCEDURE — 1036F TOBACCO NON-USER: CPT | Performed by: INTERNAL MEDICINE

## 2021-03-03 NOTE — PROGRESS NOTES
Assessment/Plan:    1  Hyperlipidemia  Will continue with Lipitor 20 mg daily  Will request lipid profile  Continue with low-fat diet, exercise and weight loss    2  GERD  Stable on present use of Prilosec 20 mg daily    3  Hyperglycemia  Will check hemoglobin A1c  Continue with low sugar/low carb diet, exercise and weight loss    4  Obstructive sleep apnea  Presently on CPAP  Advised to follow-up with sleep specialist        Diagnoses and all orders for this visit:    Need for hepatitis C screening test  -     Hepatitis C antibody; Future    Gastroesophageal reflux disease without esophagitis  -     Comprehensive metabolic panel; Future    Obesity (BMI 35 0-39 9 without comorbidity)    Hyperglycemia  -     Hemoglobin A1C; Future    Mixed hyperlipidemia  -     Lipid Panel with Direct LDL reflex; Future    Prostate cancer screening  -     PSA, Total Screen; Future    Obstructive sleep apnea          BMI Counseling: Body mass index is 36 55 kg/m²  The BMI is above normal  Nutrition recommendations include decreasing portion sizes, encouraging healthy choices of fruits and vegetables, decreasing fast food intake and consuming healthier snacks  Exercise recommendations include vigorous physical activity 75 minutes/week and exercising 3-5 times per week  No pharmacotherapy was ordered  Subjective:          Patient ID: Mckinley Patricia is a 54 y o  male  Patient is here for regular follow-up  Previous follow-up was about more than a year ago  No blood work prior to this visit  Otherwise no new complaints  The following portions of the patient's history were reviewed and updated as appropriate: allergies, current medications, past family history, past medical history, past social history, past surgical history and problem list     Review of Systems   Constitutional: Negative for fatigue and fever     HENT: Negative for congestion, ear discharge, ear pain, postnasal drip, sinus pressure, sore throat, tinnitus and trouble swallowing  Eyes: Negative for discharge, itching and visual disturbance  Respiratory: Negative for cough and shortness of breath  Cardiovascular: Negative for chest pain and palpitations  Gastrointestinal: Negative for abdominal pain, diarrhea, nausea and vomiting  Endocrine: Negative for cold intolerance and polyuria  Genitourinary: Negative for difficulty urinating, dysuria and urgency  Musculoskeletal: Negative for arthralgias and neck pain  Skin: Negative for rash  Allergic/Immunologic: Negative for environmental allergies  Neurological: Negative for dizziness, weakness and headaches  Psychiatric/Behavioral: Negative for agitation and behavioral problems  The patient is not nervous/anxious  Past Medical History:   Diagnosis Date    High cholesterol     Obstructive sleep apnea     Last Assessed:6/8/16         Current Outpatient Medications:     atorvastatin (LIPITOR) 20 mg tablet, Take 1 tablet (20 mg total) by mouth daily, Disp: 90 tablet, Rfl: 0    omeprazole (PriLOSEC) 20 mg delayed release capsule, Take 1 capsule (20 mg total) by mouth daily, Disp: 90 capsule, Rfl: 0    Allergies   Allergen Reactions    Seasonal Ic  [Cholestatin]        Social History   History reviewed  No pertinent surgical history  Family History   Problem Relation Age of Onset    Hypertension Mother     Lung cancer Father        Objective:  /84 (BP Location: Left arm, Patient Position: Sitting, Cuff Size: Large)   Pulse 65   Temp 98 5 °F (36 9 °C) (Temporal)   Ht 6' 1" (1 854 m)   Wt 126 kg (277 lb)   SpO2 98% Comment: ra  BMI 36 55 kg/m²   Body mass index is 36 55 kg/m²  Physical Exam  Constitutional:       Appearance: He is well-developed  HENT:      Head: Normocephalic  Right Ear: Ear canal and external ear normal       Left Ear: Ear canal and external ear normal       Mouth/Throat:      Pharynx: No posterior oropharyngeal erythema     Eyes: General: No scleral icterus  Pupils: Pupils are equal, round, and reactive to light  Neck:      Musculoskeletal: Normal range of motion and neck supple  Thyroid: No thyromegaly  Trachea: No tracheal deviation  Cardiovascular:      Rate and Rhythm: Normal rate and regular rhythm  Heart sounds: Normal heart sounds  Pulmonary:      Effort: Pulmonary effort is normal  No respiratory distress  Breath sounds: Normal breath sounds  Chest:      Chest wall: No tenderness  Abdominal:      General: Bowel sounds are normal       Palpations: Abdomen is soft  There is no mass  Tenderness: There is no abdominal tenderness  Musculoskeletal: Normal range of motion  Right lower leg: No edema  Left lower leg: No edema  Lymphadenopathy:      Cervical: No cervical adenopathy  Skin:     General: Skin is warm  Neurological:      Mental Status: He is alert and oriented to person, place, and time  Cranial Nerves: No cranial nerve deficit  Psychiatric:         Mood and Affect: Mood normal          Behavior: Behavior normal          Thought Content:  Thought content normal          Judgment: Judgment normal

## 2021-03-05 ENCOUNTER — TRANSCRIBE ORDERS (OUTPATIENT)
Dept: ADMINISTRATIVE | Facility: HOSPITAL | Age: 56
End: 2021-03-05

## 2021-03-05 DIAGNOSIS — G47.30 SLEEP APNEA: Primary | ICD-10-CM

## 2021-04-29 ENCOUNTER — OFFICE VISIT (OUTPATIENT)
Dept: SLEEP CENTER | Facility: CLINIC | Age: 56
End: 2021-04-29
Payer: COMMERCIAL

## 2021-04-29 VITALS
DIASTOLIC BLOOD PRESSURE: 72 MMHG | WEIGHT: 271 LBS | BODY MASS INDEX: 35.92 KG/M2 | HEIGHT: 73 IN | SYSTOLIC BLOOD PRESSURE: 130 MMHG

## 2021-04-29 DIAGNOSIS — G47.30 SLEEP APNEA: ICD-10-CM

## 2021-04-29 DIAGNOSIS — G47.33 OBSTRUCTIVE SLEEP APNEA TREATED WITH CONTINUOUS POSITIVE AIRWAY PRESSURE (CPAP): Primary | ICD-10-CM

## 2021-04-29 DIAGNOSIS — Z99.89 OBSTRUCTIVE SLEEP APNEA TREATED WITH CONTINUOUS POSITIVE AIRWAY PRESSURE (CPAP): Primary | ICD-10-CM

## 2021-04-29 DIAGNOSIS — E66.9 OBESITY (BMI 35.0-39.9 WITHOUT COMORBIDITY): ICD-10-CM

## 2021-04-29 PROCEDURE — 3008F BODY MASS INDEX DOCD: CPT | Performed by: NURSE PRACTITIONER

## 2021-04-29 PROCEDURE — 1036F TOBACCO NON-USER: CPT | Performed by: NURSE PRACTITIONER

## 2021-04-29 PROCEDURE — 99214 OFFICE O/P EST MOD 30 MIN: CPT | Performed by: NURSE PRACTITIONER

## 2021-04-29 NOTE — PROGRESS NOTES
Progress Note - Idris [age] y o  male   :1965, MRN: [id number]  2021        Follow Up Evaluation / Problem:  Severe Obstructive Sleep Apnea  Obesity      Thank you for the opportunity of participating in the evaluation and care of this patient in the Sleep Clinic at [facility]  HPI: Laly Haney is a 64y o  year old male  The patient presents for follow up of severe obstructive sleep apnea  He completed a diagnostic sleep study in 2016, which indicated severe SAADIA with an AHI of 57 4, worsening to 81 8 in supine sleep, with an oxygen lazara of 61% and sat less than 90% for 107 9 minutes  He was set up with CPAP equipment on 7/15/2016  He reports that he uses it every night  His comorbid conditions include obesity, HTN, GERD  Review of Systems      Genitourinary none   Cardiology none   Gastrointestinal none   Neurology numbness/tingling of an extremity   Constitutional none   Integumentary none   Psychiatry none   Musculoskeletal leg cramps   Pulmonary none   ENT throat clearing   Endocrine none   Hematological none       Current Outpatient Medications:     atorvastatin (LIPITOR) 20 mg tablet, Take 1 tablet (20 mg total) by mouth daily, Disp: 90 tablet, Rfl: 0    omeprazole (PriLOSEC) 20 mg delayed release capsule, Take 1 capsule (20 mg total) by mouth daily, Disp: 90 capsule, Rfl: 0    [place] Sleepiness Scale  Sitting and reading: Would never doze  Watching TV: Slight chance of dozing  Sitting, inactive in a public place (e g  a theatre or a meeting):  Would never doze  As a passenger in a car for an hour without a break: Slight chance of dozing  Lying down to rest in the afternoon when circumstances permit: Slight chance of dozing  Sitting and talking to someone: Would never doze  Sitting quietly after a lunch without alcohol: Would never doze  In a car, while stopped for a few minutes in traffic: Would never doze  Total score: 3              Vitals:    04/29/21 0800   BP: 130/72   Weight: 123 kg (271 lb)   Height: 6' 1" (1 854 m)       Body mass index is 35 75 kg/m²  Neck Circumference: 17 5       [place] SLEEPINESS SCORE  Total score: 3      Past History Since Last Sleep Center Visit:   He denies any changes to his health since his last visit  He continues to use CPAP every night, using a nasal pillow mask  He feels the pressure is comfortable at the current setting  He has gained more than 20 lbs since his last visit in 2019  He feels he sleeps much more soundly when using the equipment and feels refreshed and without daytime sleepiness  He has been very unhappy with the DME company and would like to change DME providers  He reports that he does not get any calls about supplies and has never been offered a new water reservoir  The patient reports that he does not follow a specific cleaning regimen and may use regular water if he runs out of distilled water  The review of systems and following portions of the patient's history were reviewed and updated as appropriate: allergies, current medications, past family history, past medical history, past social history, past surgical history, and problem list         OBJECTIVE    PAP Pressure: Nasal CPAP set to deliver 12 cm of water pressure  Type of mask used: nasal pillow  DME Provider:  [employer]    Physical Exam:     General Appearance:   Alert, cooperative, no distress, appears stated age, obese     Head:   Normocephalic, without obvious abnormality, atraumatic     Eyes:   PERRL, conjunctiva/corneas clear, EOM's intact          Nose:  Nares normal, septum midline, no drainage or sinus tenderness           Throat:  Lips, teeth and gums normal; tongue normal size and  shape and midline mucosa moist and redundant bilaterally, uvula thick and extending below the base of the tongue, tonsils normal, Mallampati class 3       Neck: Supple, symmetrical, trachea midline, no adenopathy; Thyroid: No enlargement, tenderness or nodules; no carotid bruit or JVD     Lungs:      Clear to auscultation bilaterally, respirations unlabored     Heart:   Regular rate and rhythm, S1 and S2 normal, no murmur, rub or gallop       Extremities:  Extremities normal, atraumatic, no cyanosis or edema       Skin:  Skin color, texture, turgor normal, no rashes or lesions       Neurologic:  No focal deficits noted       ASSESSMENT / PLAN    1  Obstructive sleep apnea treated with continuous positive airway pressure (CPAP)  PAP DME Resupply/Reorder    PAP DME Pressure Change   2  Sleep apnea  Sleep F/U  - established patient   3  Obesity (BMI 35 0-39 9 without comorbidity)  PAP DME Resupply/Reorder           Counseling / Coordination of Care  Total clinic time spent today 60 minutes  Greater than 50% of total time was spent with the patient and / or family counseling and / or coordination of care  A description of the counseling / coordination of care:     Impressions, Diagnostic results, Prognosis, Instructions for management, Risks and benefits of treatment, Patient and family education, Risk factor reductions and Importance of compliance with treatment    Today I reviewed the patient's compliance data  he has been able to use the equipment 100% of all days recorded  Average usage was 4 or more hours 100% of all days recorded  The estimated AHI is 17 7 abnormal breathing events per hour  Pressure will be changed to APAP 12-15cm, due to a large amount of obstructive apneas noted in the detailed report  The patient feels they benefit from the use of PAP equipment and would like to continue PAP therapy  Response to treatment has been good  A prescription has been provided for the next year  He would like to change DME providers  He was encouraged to check with his insurance provider regarding participating providers   He was given a list of the most commonly used local providers  He will continue using this equipment at the settings noted above for the next 12 months  At that timehe will then return for a routine follow-up evaluation  I have asked the patient to contact the [address] if he encounters any difficulties prior to that time  The following instructions have been given to the patient today:    Patient Instructions   1  Continue use of CPAP equipment nightly  2  Continue to clean your equipment, as discussed  3  Contact the [address] with any questions or concerns prior to your next visit, as needed  4  Schedule visit for follow-up in 1 year  5  Please call the nurse line below to let them know which [address] you would like to change to  The respiratory therapist at [facility] is [address] [facility]  Their contact number is [phone]  Other companies may be [employer], [address], [address]        Nursing Support:  When: Monday through Friday 7A-5PM except holidays  Where: Our direct line is [phone]  If you are having a true emergency please call 911  In the event that the line is busy or it is after hours please leave a voice message and we will return your call  Please speak clearly, leaving your full name, birth date, best number to reach you and the reason for your call  Medication refills: We will need the name of the medication, the dosage, the ordering provider, whether you get a 30 or 90 day refill, and the pharmacy name and address  Medications will be ordered by the provider only  Nurses cannot call in prescriptions  Please allow 7 days for medication refills  Physician requested updates:  If your provider requested that you call with an update after starting medication, please be ready to provide us the medication and dosage, what time you take your medication, the time you attempt to fall asleep, time you fall asleep, when you wake up, and what time you get out of bed  Sleep Study Results: We will contact you with sleep study results and/or next steps after the physician has reviewed your testing        Lauren Arias, [facility]

## 2021-04-29 NOTE — PATIENT INSTRUCTIONS
1   Continue use of CPAP equipment nightly  2  Continue to clean your equipment, as discussed  3  Contact the Sleep [facility] with any questions or concerns prior to your next visit, as needed  4  Schedule visit for follow-up in 1 year  5  Please call the nurse line below to let them know which [address] you would like to change to  The respiratory therapist at [facility] is [address] [facility]  Their contact number is [phone]  Other companies may be [employer], [address], [address]        Nursing Support:  When: Monday through Friday 7A-5PM except holidays  Where: Our direct line is [phone]  If you are having a true emergency please call 911  In the event that the line is busy or it is after hours please leave a voice message and we will return your call  Please speak clearly, leaving your full name, birth date, best number to reach you and the reason for your call  Medication refills: We will need the name of the medication, the dosage, the ordering provider, whether you get a 30 or 90 day refill, and the pharmacy name and address  Medications will be ordered by the provider only  Nurses cannot call in prescriptions  Please allow 7 days for medication refills  Physician requested updates: If your provider requested that you call with an update after starting medication, please be ready to provide us the medication and dosage, what time you take your medication, the time you attempt to fall asleep, time you fall asleep, when you wake up, and what time you get out of bed  Sleep Study Results: We will contact you with sleep study results and/or next steps after the physician has reviewed your testing

## 2021-04-30 ENCOUNTER — TELEPHONE (OUTPATIENT)
Dept: SLEEP CENTER | Facility: CLINIC | Age: 56
End: 2021-04-30

## 2021-05-06 DIAGNOSIS — E78.5 HYPERLIPIDEMIA, UNSPECIFIED HYPERLIPIDEMIA TYPE: ICD-10-CM

## 2021-05-06 RX ORDER — ATORVASTATIN CALCIUM 20 MG/1
TABLET, FILM COATED ORAL
Qty: 90 TABLET | Refills: 0 | OUTPATIENT
Start: 2021-05-06

## 2021-05-12 DIAGNOSIS — E78.5 HYPERLIPIDEMIA, UNSPECIFIED HYPERLIPIDEMIA TYPE: ICD-10-CM

## 2021-05-12 RX ORDER — ATORVASTATIN CALCIUM 20 MG/1
20 TABLET, FILM COATED ORAL DAILY
Qty: 90 TABLET | Refills: 1 | Status: SHIPPED | OUTPATIENT
Start: 2021-05-12 | End: 2021-11-10 | Stop reason: SDUPTHER

## 2021-07-02 ENCOUNTER — RA CDI HCC (OUTPATIENT)
Dept: OTHER | Facility: HOSPITAL | Age: 56
End: 2021-07-02

## 2021-07-02 NOTE — PROGRESS NOTES
[facility]  coding opportunities          Chart reviewed, no opportunity found: CHART REVIEWED, NO OPPORTUNITY FOUND                     Patients insurance company: [employer] (Medicare Advantage and Commercial)

## 2021-07-09 ENCOUNTER — OFFICE VISIT (OUTPATIENT)
Dept: INTERNAL MEDICINE CLINIC | Facility: CLINIC | Age: 56
End: 2021-07-09
Payer: COMMERCIAL

## 2021-07-09 VITALS
HEART RATE: 67 BPM | WEIGHT: 266.6 LBS | DIASTOLIC BLOOD PRESSURE: 80 MMHG | BODY MASS INDEX: 35.33 KG/M2 | TEMPERATURE: 98.7 F | OXYGEN SATURATION: 98 % | HEIGHT: 73 IN | SYSTOLIC BLOOD PRESSURE: 128 MMHG

## 2021-07-09 DIAGNOSIS — E66.9 OBESITY (BMI 35.0-39.9 WITHOUT COMORBIDITY): ICD-10-CM

## 2021-07-09 DIAGNOSIS — E78.2 MIXED HYPERLIPIDEMIA: ICD-10-CM

## 2021-07-09 DIAGNOSIS — R73.9 HYPERGLYCEMIA: ICD-10-CM

## 2021-07-09 DIAGNOSIS — G47.33 OBSTRUCTIVE SLEEP APNEA: ICD-10-CM

## 2021-07-09 DIAGNOSIS — K21.9 GASTROESOPHAGEAL REFLUX DISEASE WITHOUT ESOPHAGITIS: Primary | ICD-10-CM

## 2021-07-09 PROCEDURE — 1036F TOBACCO NON-USER: CPT | Performed by: INTERNAL MEDICINE

## 2021-07-09 PROCEDURE — 99214 OFFICE O/P EST MOD 30 MIN: CPT | Performed by: INTERNAL MEDICINE

## 2021-07-09 PROCEDURE — 3008F BODY MASS INDEX DOCD: CPT | Performed by: INTERNAL MEDICINE

## 2021-07-09 NOTE — PROGRESS NOTES
Assessment/Plan:    1  Hyperlipidemia  Liver profile is acceptable  Continue with Lipitor 20 mg daily and low-fat diet  Continue with exercise and weight loss    2  GERD  Doing well on Prilosec 20 mg daily  Continue with healthy lifestyle  3  Hyperglycemia  Recent hemoglobin A1c is 5 9  Advised for low sugar/low carb diet and exercise  Will continue to monitor  4  Obstructive sleep apnea  On CPAP machine  Being followed by sleep specialist      Diagnoses and all orders for this visit:    Gastroesophageal reflux disease without esophagitis    Obstructive sleep apnea    Obesity (BMI 35 0-39 9 without comorbidity)    Mixed hyperlipidemia  -     Lipid Panel with Direct LDL reflex; Future  -     ALT; Future  -     AST; Future    Hyperglycemia               Subjective:          Patient ID: Victorino Park is a 64 y o  male  Patient is here for regular follow-up  Does have blood work done last week would like to discuss results  Otherwise no new complaints  The following portions of the patient's history were reviewed and updated as appropriate: allergies, current medications, past family history, past medical history, past social history, past surgical history and problem list     Review of Systems   Constitutional: Negative for fatigue and fever  HENT: Negative for congestion, ear discharge, ear pain, postnasal drip, sinus pressure, sore throat, tinnitus and trouble swallowing  Eyes: Negative for discharge, itching and visual disturbance  Respiratory: Negative for cough and shortness of breath  Cardiovascular: Negative for chest pain and palpitations  Gastrointestinal: Negative for abdominal pain, diarrhea, nausea and vomiting  Endocrine: Negative for cold intolerance and polyuria  Genitourinary: Negative for difficulty urinating, dysuria and urgency  Musculoskeletal: Negative for arthralgias and neck pain  Skin: Negative for rash     Allergic/Immunologic: Negative for environmental allergies  Neurological: Negative for dizziness, weakness and headaches  Psychiatric/Behavioral: Negative for agitation, behavioral problems and decreased concentration  The patient is not nervous/anxious  Past Medical History:   Diagnosis Date    High cholesterol     Obstructive sleep apnea     Last Assessed:6/8/16         Current Outpatient Medications:     atorvastatin (LIPITOR) 20 mg tablet, Take 1 tablet (20 mg total) by mouth daily, Disp: 90 tablet, Rfl: 1    omeprazole (PriLOSEC) 20 mg delayed release capsule, Take 1 capsule (20 mg total) by mouth daily, Disp: 90 capsule, Rfl: 0    Allergies   Allergen Reactions    Seasonal Ic  [Cholestatin]        Social History   History reviewed  No pertinent surgical history  Family History   Problem Relation Age of Onset    Hypertension Mother     Lung cancer Father        Objective:  /80 (BP Location: Left arm, Patient Position: Sitting, Cuff Size: Adult)   Pulse 67   Temp 98 7 °F (37 1 °C)   Ht 6' 1" (1 854 m)   Wt 121 kg (266 lb 9 6 oz)   SpO2 98%   BMI 35 17 kg/m²   Body mass index is 35 17 kg/m²  Physical Exam  Constitutional:       Appearance: He is well-developed  HENT:      Head: Normocephalic  Right Ear: External ear normal       Left Ear: External ear normal       Nose: No rhinorrhea  Mouth/Throat:      Mouth: Mucous membranes are moist    Eyes:      General: No scleral icterus  Pupils: Pupils are equal, round, and reactive to light  Neck:      Thyroid: No thyromegaly  Trachea: No tracheal deviation  Cardiovascular:      Rate and Rhythm: Normal rate and regular rhythm  Heart sounds: Normal heart sounds  No murmur heard  Pulmonary:      Effort: Pulmonary effort is normal  No respiratory distress  Breath sounds: Normal breath sounds  Chest:      Chest wall: No tenderness  Abdominal:      General: Bowel sounds are normal       Palpations: Abdomen is soft   There is no mass       Tenderness: There is no abdominal tenderness  Musculoskeletal:         General: Normal range of motion  Cervical back: Normal range of motion and neck supple  Right lower leg: No edema  Left lower leg: No edema  Lymphadenopathy:      Cervical: No cervical adenopathy  Skin:     General: Skin is warm  Findings: No erythema or rash  Neurological:      Mental Status: He is alert and oriented to person, place, and time  Cranial Nerves: No cranial nerve deficit  Psychiatric:         Mood and Affect: Mood normal          Behavior: Behavior normal          Thought Content:  Thought content normal          Judgment: Judgment normal

## 2021-11-10 DIAGNOSIS — E78.5 HYPERLIPIDEMIA, UNSPECIFIED HYPERLIPIDEMIA TYPE: ICD-10-CM

## 2021-11-10 RX ORDER — ATORVASTATIN CALCIUM 20 MG/1
20 TABLET, FILM COATED ORAL DAILY
Qty: 90 TABLET | Refills: 1 | Status: SHIPPED | OUTPATIENT
Start: 2021-11-10 | End: 2022-05-13 | Stop reason: SDUPTHER

## 2021-11-15 DIAGNOSIS — K21.9 GASTROESOPHAGEAL REFLUX DISEASE: ICD-10-CM

## 2021-11-15 RX ORDER — OMEPRAZOLE 20 MG/1
20 CAPSULE, DELAYED RELEASE ORAL DAILY
Qty: 90 CAPSULE | Refills: 0 | Status: SHIPPED | OUTPATIENT
Start: 2021-11-15 | End: 2022-02-08 | Stop reason: SDUPTHER

## 2022-01-11 ENCOUNTER — APPOINTMENT (OUTPATIENT)
Dept: LAB | Facility: IMAGING CENTER | Age: 57
End: 2022-01-11
Payer: COMMERCIAL

## 2022-01-11 DIAGNOSIS — E78.2 MIXED HYPERLIPIDEMIA: ICD-10-CM

## 2022-01-11 LAB
ALT SERPL W P-5'-P-CCNC: 53 U/L (ref 12–78)
AST SERPL W P-5'-P-CCNC: 24 U/L (ref 5–45)
CHOLEST SERPL-MCNC: 142 MG/DL
HDLC SERPL-MCNC: 38 MG/DL
LDLC SERPL CALC-MCNC: 83 MG/DL (ref 0–100)
TRIGL SERPL-MCNC: 104 MG/DL

## 2022-01-11 PROCEDURE — 80061 LIPID PANEL: CPT

## 2022-01-11 PROCEDURE — 36415 COLL VENOUS BLD VENIPUNCTURE: CPT

## 2022-01-11 PROCEDURE — 84450 TRANSFERASE (AST) (SGOT): CPT

## 2022-01-11 PROCEDURE — 84460 ALANINE AMINO (ALT) (SGPT): CPT

## 2022-02-08 ENCOUNTER — OFFICE VISIT (OUTPATIENT)
Dept: INTERNAL MEDICINE CLINIC | Facility: OTHER | Age: 57
End: 2022-02-08
Payer: COMMERCIAL

## 2022-02-08 VITALS
BODY MASS INDEX: 36.98 KG/M2 | TEMPERATURE: 98.9 F | HEIGHT: 73 IN | HEART RATE: 83 BPM | WEIGHT: 279 LBS | OXYGEN SATURATION: 94 % | DIASTOLIC BLOOD PRESSURE: 80 MMHG | SYSTOLIC BLOOD PRESSURE: 120 MMHG

## 2022-02-08 DIAGNOSIS — Z12.5 PROSTATE CANCER SCREENING: ICD-10-CM

## 2022-02-08 DIAGNOSIS — Z12.11 COLON CANCER SCREENING: ICD-10-CM

## 2022-02-08 DIAGNOSIS — M25.561 CHRONIC PAIN OF BOTH KNEES: ICD-10-CM

## 2022-02-08 DIAGNOSIS — K21.9 GASTROESOPHAGEAL REFLUX DISEASE WITHOUT ESOPHAGITIS: Primary | ICD-10-CM

## 2022-02-08 DIAGNOSIS — G47.33 OBSTRUCTIVE SLEEP APNEA: ICD-10-CM

## 2022-02-08 DIAGNOSIS — K21.9 GASTROESOPHAGEAL REFLUX DISEASE: ICD-10-CM

## 2022-02-08 DIAGNOSIS — E78.2 MIXED HYPERLIPIDEMIA: ICD-10-CM

## 2022-02-08 DIAGNOSIS — M25.562 CHRONIC PAIN OF BOTH KNEES: ICD-10-CM

## 2022-02-08 DIAGNOSIS — G89.29 CHRONIC PAIN OF BOTH KNEES: ICD-10-CM

## 2022-02-08 DIAGNOSIS — E66.9 OBESITY (BMI 35.0-39.9 WITHOUT COMORBIDITY): ICD-10-CM

## 2022-02-08 DIAGNOSIS — R73.03 PREDIABETES: ICD-10-CM

## 2022-02-08 PROCEDURE — 3725F SCREEN DEPRESSION PERFORMED: CPT | Performed by: INTERNAL MEDICINE

## 2022-02-08 PROCEDURE — 99214 OFFICE O/P EST MOD 30 MIN: CPT | Performed by: INTERNAL MEDICINE

## 2022-02-08 PROCEDURE — 3008F BODY MASS INDEX DOCD: CPT | Performed by: INTERNAL MEDICINE

## 2022-02-08 PROCEDURE — 1036F TOBACCO NON-USER: CPT | Performed by: INTERNAL MEDICINE

## 2022-02-08 RX ORDER — OMEPRAZOLE 20 MG/1
20 CAPSULE, DELAYED RELEASE ORAL DAILY
Qty: 90 CAPSULE | Refills: 1 | Status: SHIPPED | OUTPATIENT
Start: 2022-02-08 | End: 2022-05-13 | Stop reason: SDUPTHER

## 2022-02-08 NOTE — ASSESSMENT & PLAN NOTE
Again advised for better diet control and exercise  Also offered to refer to weight management but patient declined at present

## 2022-02-08 NOTE — ASSESSMENT & PLAN NOTE
Lipid profile is acceptable on present regimen    Continue with low-fat diet and also advised to lose weight

## 2022-02-08 NOTE — ASSESSMENT & PLAN NOTE
X-ray both knee requested  Also refer patient to orthopedic surgeon for further evaluation and management    Patient is  interested in a knee replacement

## 2022-02-08 NOTE — PROGRESS NOTES
Assessment/Plan:    1  Bilateral knee pain secondary to osteoarthritis  X-ray both knees requested  Also refer patient to orthopedic surgeon for further management because patient is interested in knee replacement    2  Hyperlipidemia  Continue with present regimen  Will check lipid profile before next visit    3  Prediabetes  Continue with low sugar/low carb diet  Will check hemoglobin A1c before next visit    4  Morbid obesity  Again advised for better diet control and exercise  Also offered to refer to weight management but patient declined  5  GERD  Doing well on present dose of PPI       Diagnoses and all orders for this visit:    Gastroesophageal reflux disease without esophagitis    Gastroesophageal reflux disease  -     omeprazole (PriLOSEC) 20 mg delayed release capsule; Take 1 capsule (20 mg total) by mouth daily  -     CBC; Future    Obstructive sleep apnea    Mixed hyperlipidemia  -     Lipid Panel with Direct LDL reflex; Future  -     Comprehensive metabolic panel; Future    Obesity (BMI 35 0-39 9 without comorbidity)    Prediabetes  -     CBC; Future  -     Hemoglobin A1C; Future    Chronic pain of both knees  -     Ambulatory Referral to Orthopedic Surgery; Future  -     XR knee 3 vw left non injury; Future  -     XR knee 3 vw right non injury; Future    Prostate cancer screening  -     PSA, Total Screen; Future    Colon cancer screening  -     Ambulatory Referral to Gastroenterology; Future          BMI Counseling: Body mass index is 36 81 kg/m²  The BMI is above normal  Nutrition recommendations include decreasing portion sizes and encouraging healthy choices of fruits and vegetables  Exercise recommendations include vigorous physical activity 75 minutes/week and exercising 3-5 times per week  Rationale for BMI follow-up plan is due to patient being overweight or obese  Depression Screening and Follow-up Plan: Patient was screened for depression during today's encounter   They screened negative with a PHQ-2 score of 0  Subjective:          Patient ID: Vick Marshall is a 64 y o  male  Patient is here for regular follow-up  Does have blood work done last week would like to discuss results  Also complaining of bilateral knee pain gradually getting worse over the last many months/years  The following portions of the patient's history were reviewed and updated as appropriate: allergies, current medications, past family history, past medical history, past social history, past surgical history and problem list     Review of Systems   Constitutional: Negative for fatigue and fever  HENT: Negative for congestion, ear discharge, ear pain, postnasal drip, sinus pressure, sore throat, tinnitus and trouble swallowing  Eyes: Negative for discharge, itching and visual disturbance  Respiratory: Negative for cough and shortness of breath  Cardiovascular: Negative for chest pain and palpitations  Gastrointestinal: Negative for abdominal pain, diarrhea, nausea and vomiting  Endocrine: Negative for cold intolerance and polyuria  Genitourinary: Negative for difficulty urinating, dysuria and urgency  Musculoskeletal: Positive for arthralgias  Negative for neck pain  Skin: Negative for rash  Allergic/Immunologic: Negative for environmental allergies  Neurological: Negative for dizziness, weakness and headaches  Psychiatric/Behavioral: The patient is not nervous/anxious  Past Medical History:   Diagnosis Date    High cholesterol     Obstructive sleep apnea     Last Assessed:6/8/16         Current Outpatient Medications:     atorvastatin (LIPITOR) 20 mg tablet, Take 1 tablet (20 mg total) by mouth daily, Disp: 90 tablet, Rfl: 1    omeprazole (PriLOSEC) 20 mg delayed release capsule, Take 1 capsule (20 mg total) by mouth daily, Disp: 90 capsule, Rfl: 1    Allergies   Allergen Reactions    Seasonal Ic  [Cholestatin]        Social History   History reviewed   No pertinent surgical history  Family History   Problem Relation Age of Onset    Hypertension Mother     Lung cancer Father        Objective:  /80 (BP Location: Left arm, Patient Position: Sitting, Cuff Size: Large)   Pulse 83   Temp 98 9 °F (37 2 °C) (Tympanic)   Ht 6' 1" (1 854 m)   Wt 127 kg (279 lb)   SpO2 94% Comment: room air  BMI 36 81 kg/m²   Body mass index is 36 81 kg/m²  Physical Exam  Constitutional:       Appearance: He is well-developed  HENT:      Head: Normocephalic  Right Ear: External ear normal       Left Ear: External ear normal       Nose: No rhinorrhea  Mouth/Throat:      Pharynx: No posterior oropharyngeal erythema  Eyes:      General: No scleral icterus  Pupils: Pupils are equal, round, and reactive to light  Neck:      Thyroid: No thyromegaly  Trachea: No tracheal deviation  Cardiovascular:      Rate and Rhythm: Normal rate and regular rhythm  Heart sounds: Normal heart sounds  Pulmonary:      Effort: Pulmonary effort is normal  No respiratory distress  Breath sounds: Normal breath sounds  Chest:      Chest wall: No tenderness  Abdominal:      General: Bowel sounds are normal       Palpations: Abdomen is soft  There is no mass  Tenderness: There is no abdominal tenderness  Musculoskeletal:         General: Normal range of motion  Cervical back: Normal range of motion and neck supple  Right lower leg: No edema  Left lower leg: No edema  Lymphadenopathy:      Cervical: No cervical adenopathy  Skin:     General: Skin is warm  Findings: No lesion or rash  Neurological:      Mental Status: He is alert  He is disoriented  Cranial Nerves: No cranial nerve deficit     Psychiatric:         Mood and Affect: Mood normal          Behavior: Behavior normal

## 2022-04-29 ENCOUNTER — OFFICE VISIT (OUTPATIENT)
Dept: SLEEP CENTER | Facility: CLINIC | Age: 57
End: 2022-04-29
Payer: COMMERCIAL

## 2022-04-29 VITALS
SYSTOLIC BLOOD PRESSURE: 114 MMHG | HEART RATE: 71 BPM | BODY MASS INDEX: 36.5 KG/M2 | HEIGHT: 73 IN | WEIGHT: 275.4 LBS | DIASTOLIC BLOOD PRESSURE: 78 MMHG

## 2022-04-29 DIAGNOSIS — G47.33 OBSTRUCTIVE SLEEP APNEA TREATED WITH CONTINUOUS POSITIVE AIRWAY PRESSURE (CPAP): Primary | ICD-10-CM

## 2022-04-29 DIAGNOSIS — E66.9 OBESITY (BMI 35.0-39.9 WITHOUT COMORBIDITY): ICD-10-CM

## 2022-04-29 DIAGNOSIS — Z99.89 OBSTRUCTIVE SLEEP APNEA TREATED WITH CONTINUOUS POSITIVE AIRWAY PRESSURE (CPAP): Primary | ICD-10-CM

## 2022-04-29 PROCEDURE — 99214 OFFICE O/P EST MOD 30 MIN: CPT | Performed by: NURSE PRACTITIONER

## 2022-04-29 NOTE — PATIENT INSTRUCTIONS
1   Continue use of CPAP equipment nightly  2  Continue to clean your equipment, as discussed  3  Contact the Sleep [address] [place] [address] with any questions or concerns prior to your next visit, as needed  4  Schedule visit for follow-up in 1 year    Nursing Support:  When: Monday through Friday 7A-5PM except holidays  Where: Our direct line is [phone]  If you are having a true emergency please call 911  In the event that the line is busy or it is after hours please leave a voice message and we will return your call  Please speak clearly, leaving your full name, birth date, best number to reach you and the reason for your call  Medication refills: We will need the name of the medication, the dosage, the ordering provider, whether you get a 30 or 90 day refill, and the pharmacy name and address  Medications will be ordered by the provider only  Nurses cannot call in prescriptions  Please allow 7 days for medication refills  Physician requested updates: If your provider requested that you call with an update after starting medication, please be ready to provide us the medication and dosage, what time you take your medication, the time you attempt to fall asleep, time you fall asleep, when you wake up, and what time you get out of bed  Sleep Study Results: We will contact you with sleep study results and/or next steps after the physician has reviewed your testing

## 2022-04-29 NOTE — PROGRESS NOTES
Progress Note - Idris [age] y o  male   :1965, MRN: [id number]  2022        Follow Up Evaluation / Problem:  Severe Obstructive Sleep Apnea  Obesity      Thank you for the opportunity of participating in the evaluation and care of this patient in the Sleep Clinic at [facility]  HPI: Gemma Moreno is a 62y o  year old male  The patient presents for follow up of severe obstructive sleep apnea  He completed a diagnostic sleep study in 2016, which indicated severe SAADIA with an AHI of 57 4, worsening to 81 8 in supine sleep, with an oxygen lazara of 61% and sat less than 90% for 107 9 minutes  He was set up with CPAP equipment on 7/15/2016  He reports that he uses it every night  His comorbid conditions include obesity, HTN, GERD  He is here today to review annual compliance and effectiveness of treatment      Review of Systems      Genitourinary none   Cardiology none   Gastrointestinal none   Neurology none   Constitutional none   Integumentary none   Psychiatry none   Musculoskeletal none   Pulmonary none   ENT none   Endocrine none   Hematological none       Current Outpatient Medications:     atorvastatin (LIPITOR) 20 mg tablet, Take 1 tablet (20 mg total) by mouth daily, Disp: 90 tablet, Rfl: 1    omeprazole (PriLOSEC) 20 mg delayed release capsule, Take 1 capsule (20 mg total) by mouth daily, Disp: 90 capsule, Rfl: 1    [place] Sleepiness Scale  Sitting and reading: Would never doze  Watching TV: Slight chance of dozing  Sitting, inactive in a public place (e g  a theatre or a meeting): Slight chance of dozing  As a passenger in a car for an hour without a break: Slight chance of dozing  Lying down to rest in the afternoon when circumstances permit: Moderate chance of dozing  Sitting and talking to someone: Would never doze  Sitting quietly after a lunch without alcohol: Would never doze  In a car, while stopped for a few minutes in traffic: Would never doze  Total score: 5              Vitals:    04/29/22 0744   BP: 114/78   Pulse: 71   Weight: 125 kg (275 lb 6 4 oz)   Height: 6' 1" (1 854 m)       Body mass index is 36 33 kg/m²  EPWORTH SLEEPINESS SCORE  Total score: 5      Past History Since Last Sleep Center Visit:   He denies any changes to his health since his last visit  He continues to use CPAP every night, using a nasal pillow mask  He feels the pressure is comfortable at the current setting  He states that he notices significant improvement in his sleep since starting PAP therapy years ago  He is somewhat bothered by the tubing during sleep because of restlessness, but knows the treatment helps  The review of systems and following portions of the patient's history were reviewed and updated as appropriate: allergies, current medications, past family history, past medical history, past social history, past surgical history, and problem list         OBJECTIVE  Equipment set up date:  7/15/16 - received the [employer] 2 replacement  PAP Pressure: Nasal CPAP set to deliver 12 cm of water pressure  Type of mask used: nasal pillow  DME Provider: [employer]    Physical Exam:     General Appearance:   Alert, cooperative, no distress, appears stated age, obese     Head:   Normocephalic, without obvious abnormality, atraumatic     Eyes:   PERRL, conjunctiva/corneas clear, EOM's intact          Nose:  Nares normal, septum midline, no drainage or sinus tenderness           Throat:  Lips, teeth and gums normal; tongue large in sizeand midline mucosa moist and redundant bilaterally with short A/P diameter, uvula thick and retracts with phonation, tonsils normal, Mallampati class 3       Neck:  Supple, short and thick, trachea midline, no adenopathy;  Thyroid: No enlargement, tenderness or nodules; no carotid bruit or JVD     Lungs:      Clear to auscultation bilaterally, respirations unlabored     Heart:   Regular rate and rhythm, S1 and S2 normal, no murmur, rub or gallop       Extremities:  Extremities normal, atraumatic, no cyanosis or edema       Skin:  Skin color, texture, turgor normal, no rashes or lesions       Neurologic:  No focal deficits noted       ASSESSMENT / PLAN    1  Obstructive sleep apnea treated with continuous positive airway pressure (CPAP)  PAP DME Resupply/Reorder   2  Obesity (BMI 35 0-39 9 without comorbidity)             Counseling / Coordination of Care  Total clinic time spent today 30 minutes  Greater than 50% of total time was spent with the patient and / or family counseling and / or coordination of care  A description of the counseling / coordination of care:     Impressions, Diagnostic results, Prognosis, Instructions for management, Risks and benefits of treatment, Patient and family education, Risk factor reductions and Importance of compliance with treatment    Today I reviewed the patient's compliance data  he has been able to use the equipment 100% of all days recorded  Average usage was 4 or more hours 100% of all days recorded  The estimated AHI is 7 8 abnormal breathing events per hour, improved from 12 at last evaluation  A pressure change was ordered, but did not take place  The obstructive events were only 2 8 with central events at 1 7  No changes to the pressure will be made at this time  The patient feels they benefit from the use of PAP equipment and would like to continue PAP therapy  Response to treatment has been very good  A prescription has been provided for the next year  He will continue using this equipment at the settings noted above for the next 12 months  At that timehe will then return for a routine follow-up evaluation  I have asked the patient to contact the Sleep [facility] if he encounters any difficulties prior to that time      The following instructions have been given to the patient today:    Patient Instructions   1  Continue use of CPAP equipment nightly  2  Continue to clean your equipment, as discussed  3  Contact the Sleep [address] [place] [address] with any questions or concerns prior to your next visit, as needed  4  Schedule visit for follow-up in 1 year    Nursing Support:  When: Monday through Friday 7A-5PM except holidays  Where: Our direct line is [phone]  If you are having a true emergency please call 911  In the event that the line is busy or it is after hours please leave a voice message and we will return your call  Please speak clearly, leaving your full name, birth date, best number to reach you and the reason for your call  Medication refills: We will need the name of the medication, the dosage, the ordering provider, whether you get a 30 or 90 day refill, and the pharmacy name and address  Medications will be ordered by the provider only  Nurses cannot call in prescriptions  Please allow 7 days for medication refills  Physician requested updates: If your provider requested that you call with an update after starting medication, please be ready to provide us the medication and dosage, what time you take your medication, the time you attempt to fall asleep, time you fall asleep, when you wake up, and what time you get out of bed  Sleep Study Results: We will contact you with sleep study results and/or next steps after the physician has reviewed your testing        Bobbi Calixto [address] [place] [address]

## 2022-05-02 ENCOUNTER — TELEPHONE (OUTPATIENT)
Dept: SLEEP CENTER | Facility: CLINIC | Age: 57
End: 2022-05-02

## 2022-05-02 ENCOUNTER — CONSULT (OUTPATIENT)
Dept: GASTROENTEROLOGY | Facility: MEDICAL CENTER | Age: 57
End: 2022-05-02
Payer: COMMERCIAL

## 2022-05-02 VITALS
HEART RATE: 70 BPM | SYSTOLIC BLOOD PRESSURE: 120 MMHG | TEMPERATURE: 99.1 F | OXYGEN SATURATION: 97 % | DIASTOLIC BLOOD PRESSURE: 70 MMHG | RESPIRATION RATE: 18 BRPM | HEIGHT: 73 IN | BODY MASS INDEX: 36.84 KG/M2 | WEIGHT: 278 LBS

## 2022-05-02 DIAGNOSIS — K21.9 GASTROESOPHAGEAL REFLUX DISEASE, UNSPECIFIED WHETHER ESOPHAGITIS PRESENT: Primary | ICD-10-CM

## 2022-05-02 DIAGNOSIS — Z12.11 COLON CANCER SCREENING: ICD-10-CM

## 2022-05-02 PROCEDURE — 1036F TOBACCO NON-USER: CPT | Performed by: INTERNAL MEDICINE

## 2022-05-02 PROCEDURE — 99204 OFFICE O/P NEW MOD 45 MIN: CPT | Performed by: INTERNAL MEDICINE

## 2022-05-02 PROCEDURE — 3008F BODY MASS INDEX DOCD: CPT | Performed by: INTERNAL MEDICINE

## 2022-05-02 NOTE — PROGRESS NOTES
[facility] - Outpatient Consultation  Chucky Kirk 62 y o  male MRN: [id number]  Encounter: [id number]          ASSESSMENT AND PLAN:      1  Colon cancer screening  Patient will need screening colonoscopy every 5 years due to significant family history of colon cancer  Schedule screening colonoscopy  Patient was counseled on the benefits and risks of endoscopic intervention including but not limited to bleeding, infection, bowel perforation  Patient also understands colonoscopy is not 100% sensitive to detect polyps  - polyethylene glycol (GOLYTELY) 4000 mL solution; Take 4,000 mL by mouth once for 1 dose  Dispense: 4000 mL; Refill: 0  - bisacodyl (DULCOLAX) 5 mg EC tablet; Take 2 tablets (10 mg total) by mouth once for 1 dose  Dispense: 2 tablet; Refill: 0  - Colonoscopy; Future    2  Gastroesophageal reflux disease, unspecified whether esophagitis present  Patient has acid reflux disease was well controlled with low-dose PPI  Will continue PPI for now  He had EGD in the past   He will look for the report  He denies family history of esophageal cancer  Does not have any alarming symptoms  Will continue to follow up for acid reflux disease     ______________________________________________________________________    HPI:      14-year-old man referred for evaluation of colon cancer screening  Patient reported he does not remember when was the last time he had a colonoscopy but it was several years ago  He denies history of colon polyps  His sister was diagnosed with colon cancer at the age of 39  He reportedly having 1 to 2 colonoscopies in the past   He has regular formed bowel movement  Denies blood in the stool  Denies abdominal pain  He has acid reflux for many years and he has been taking omeprazole 20 milligrams every day with good control of her symptoms  He reported he had endoscopy several years ago and was told everything was normal   His weight has been stable  He does not smoke and he denies alcohol abuse  REVIEW OF SYSTEMS:    CONSTITUTIONAL: Denies any fever, chills, rigors, and weight loss  HEENT: No earache or tinnitus  Denies hearing loss or visual disturbances  CARDIOVASCULAR: No chest pain or palpitations  RESPIRATORY: Denies any cough, hemoptysis, shortness of breath or dyspnea on exertion  GASTROINTESTINAL: As noted in the History of Present Illness  GENITOURINARY: No problems with urination  Denies any hematuria or dysuria  NEUROLOGIC: No dizziness or vertigo, denies headaches  MUSCULOSKELETAL: Denies any muscle or joint pain  SKIN: Denies skin rashes or itching  ENDOCRINE: Denies excessive thirst  Denies intolerance to heat or cold  PSYCHOSOCIAL: Denies depression or anxiety  Denies any recent memory loss  Historical Information   Past Medical History:   Diagnosis Date    High cholesterol     Obstructive sleep apnea     Last Assessed:6/8/16     History reviewed  No pertinent surgical history  Social History   Social History     Substance and Sexual Activity   Alcohol Use No     Social History     Substance and Sexual Activity   Drug Use No     Social History     Tobacco Use   Smoking Status Never Smoker   Smokeless Tobacco Never Used     Family History   Problem Relation Age of Onset    Hypertension Mother     Lung cancer Father        Meds/Allergies       Current Outpatient Medications:     atorvastatin (LIPITOR) 20 mg tablet    omeprazole (PriLOSEC) 20 mg delayed release capsule    bisacodyl (DULCOLAX) 5 mg EC tablet    polyethylene glycol (GOLYTELY) 4000 mL solution    Allergies   Allergen Reactions    Seasonal Ic  [Cholestatin]            Objective     Blood pressure 120/70, pulse 70, temperature 99 1 °F (37 3 °C), temperature source Tympanic, resp  rate 18, height 6' 1" (1 854 m), weight 126 kg (278 lb), SpO2 97 %  Body mass index is 36 68 kg/m²          PHYSICAL EXAM:      General Appearance:   Alert, cooperative, no distress   HEENT:   Normocephalic, atraumatic, anicteric      Neck:  Supple, symmetrical, trachea midline   Lungs:   Clear to auscultation bilaterally; no rales, rhonchi or wheezing; respirations unlabored    Heart[de-identified]   Regular rate and rhythm; no murmur, rub, or gallop  Abdomen:   Soft, non-tender, non-distended; normal bowel sounds; no masses, no organomegaly    Genitalia:   Deferred    Rectal:   Deferred    Extremities:  No cyanosis, clubbing or edema    Pulses:  2+ and symmetric    Skin:  No jaundice, rashes, or lesions    Lymph nodes:  No palpable cervical lymphadenopathy        Lab Results:   No visits with results within 1 Day(s) from this visit  Latest known visit with results is:   Appointment on 01/11/2022   Component Date Value    Cholesterol 01/11/2022 142     Triglycerides 01/11/2022 104     HDL, Direct 01/11/2022 38*    LDL Calculated 01/11/2022 83     ALT 01/11/2022 53     AST 01/11/2022 24          Radiology Results:   No results found

## 2022-05-02 NOTE — PATIENT INSTRUCTIONS
Scheduled date of colon (as of today) 7/26/22  Physician performing Dr Radha Avendano  Location of procedure  [facility]  Bowel prep reviewed with patient: Thea/Dulcolax  Instructions reviewed with patient by: ma  Clearances: na

## 2022-05-06 ENCOUNTER — TELEPHONE (OUTPATIENT)
Dept: GASTROENTEROLOGY | Facility: MEDICAL CENTER | Age: 57
End: 2022-05-06

## 2022-05-06 NOTE — TELEPHONE ENCOUNTER
Called patient to move up his procedure per his request  Patient is now scheduled for 5/16/22 with Dr Prashant Venegas at the [facility] end  Patient is aware he will receive a phone call the Friday before to assign the arrival time  Patient was also given the direct dial number if he has any further questions   Message was sent the Auth team if a prior auth is needed

## 2022-05-13 DIAGNOSIS — K21.9 GASTROESOPHAGEAL REFLUX DISEASE: ICD-10-CM

## 2022-05-13 DIAGNOSIS — E78.5 HYPERLIPIDEMIA, UNSPECIFIED HYPERLIPIDEMIA TYPE: ICD-10-CM

## 2022-05-13 RX ORDER — OMEPRAZOLE 20 MG/1
20 CAPSULE, DELAYED RELEASE ORAL DAILY
Qty: 90 CAPSULE | Refills: 1 | Status: SHIPPED | OUTPATIENT
Start: 2022-05-13 | End: 2022-07-20 | Stop reason: SDUPTHER

## 2022-05-13 RX ORDER — ATORVASTATIN CALCIUM 20 MG/1
20 TABLET, FILM COATED ORAL DAILY
Qty: 90 TABLET | Refills: 1 | Status: SHIPPED | OUTPATIENT
Start: 2022-05-13 | End: 2022-07-20 | Stop reason: SDUPTHER

## 2022-05-14 ENCOUNTER — TELEPHONE (OUTPATIENT)
Dept: LABOR AND DELIVERY | Facility: HOSPITAL | Age: 57
End: 2022-05-14

## 2022-05-16 ENCOUNTER — ANESTHESIA (OUTPATIENT)
Dept: GASTROENTEROLOGY | Facility: MEDICAL CENTER | Age: 57
End: 2022-05-16

## 2022-05-16 ENCOUNTER — HOSPITAL ENCOUNTER (OUTPATIENT)
Dept: GASTROENTEROLOGY | Facility: MEDICAL CENTER | Age: 57
Setting detail: OUTPATIENT SURGERY
Discharge: HOME | End: 2022-05-16
Attending: INTERNAL MEDICINE | Admitting: INTERNAL MEDICINE
Payer: COMMERCIAL

## 2022-05-16 ENCOUNTER — ANESTHESIA EVENT (OUTPATIENT)
Dept: GASTROENTEROLOGY | Facility: MEDICAL CENTER | Age: 57
End: 2022-05-16

## 2022-05-16 VITALS
SYSTOLIC BLOOD PRESSURE: 110 MMHG | HEIGHT: 73 IN | WEIGHT: 277.78 LBS | TEMPERATURE: 97.6 F | RESPIRATION RATE: 20 BRPM | BODY MASS INDEX: 36.82 KG/M2 | DIASTOLIC BLOOD PRESSURE: 74 MMHG | HEART RATE: 65 BPM | OXYGEN SATURATION: 96 %

## 2022-05-16 DIAGNOSIS — Z12.11 COLON CANCER SCREENING: ICD-10-CM

## 2022-05-16 PROCEDURE — 45380 COLONOSCOPY AND BIOPSY: CPT | Performed by: INTERNAL MEDICINE

## 2022-05-16 PROCEDURE — 45385 COLONOSCOPY W/LESION REMOVAL: CPT | Performed by: INTERNAL MEDICINE

## 2022-05-16 PROCEDURE — 88305 TISSUE EXAM BY PATHOLOGIST: CPT | Performed by: PATHOLOGY

## 2022-05-16 RX ORDER — PROPOFOL 10 MG/ML
INJECTION, EMULSION INTRAVENOUS AS NEEDED
Status: DISCONTINUED | OUTPATIENT
Start: 2022-05-16 | End: 2022-05-16

## 2022-05-16 RX ORDER — LIDOCAINE HYDROCHLORIDE 20 MG/ML
INJECTION, SOLUTION EPIDURAL; INFILTRATION; INTRACAUDAL; PERINEURAL AS NEEDED
Status: DISCONTINUED | OUTPATIENT
Start: 2022-05-16 | End: 2022-05-16

## 2022-05-16 RX ORDER — SODIUM CHLORIDE 9 MG/ML
125 INJECTION, SOLUTION INTRAVENOUS CONTINUOUS
Status: DISCONTINUED | OUTPATIENT
Start: 2022-05-16 | End: 2022-05-20 | Stop reason: HOSPADM

## 2022-05-16 RX ADMIN — PROPOFOL 120 MG: 10 INJECTION, EMULSION INTRAVENOUS at 07:28

## 2022-05-16 RX ADMIN — LIDOCAINE HYDROCHLORIDE 4 ML: 20 INJECTION, SOLUTION EPIDURAL; INFILTRATION; INTRACAUDAL; PERINEURAL at 07:28

## 2022-05-16 RX ADMIN — PROPOFOL 20 MG: 10 INJECTION, EMULSION INTRAVENOUS at 07:39

## 2022-05-16 RX ADMIN — PROPOFOL 50 MG: 10 INJECTION, EMULSION INTRAVENOUS at 07:32

## 2022-05-16 RX ADMIN — PROPOFOL 30 MG: 10 INJECTION, EMULSION INTRAVENOUS at 07:37

## 2022-05-16 RX ADMIN — PROPOFOL 30 MG: 10 INJECTION, EMULSION INTRAVENOUS at 07:47

## 2022-05-16 RX ADMIN — PROPOFOL 50 MG: 10 INJECTION, EMULSION INTRAVENOUS at 07:42

## 2022-05-16 RX ADMIN — SODIUM CHLORIDE 125 ML/HR: 0.9 INJECTION, SOLUTION INTRAVENOUS at 07:04

## 2022-05-16 RX ADMIN — Medication 40 MG: at 07:34

## 2022-05-16 NOTE — ANESTHESIA POSTPROCEDURE EVALUATION
Post-Op Assessment Note    CV Status:  Stable    Pain management: adequate     Mental Status:  Alert and awake   Hydration Status:  Euvolemic   PONV Controlled:  Controlled   Airway Patency:  Patent      Post Op Vitals Reviewed: Yes            No complications documented      BP      Temp      Pulse     Resp      SpO2      /74   Pulse 65   Temp 97 6 °F (36 4 °C) (Temporal)   Resp 20   Ht 6' 1" (1 854 m)   Wt 126 kg (277 lb 12 5 oz)   SpO2 96%   BMI 36 65 kg/m²

## 2022-05-16 NOTE — H&P
History and Physical -  [facility]  Samara Hutchins 62 y o  male MRN: [id number]    HPI: Samara Hutchins is a 62y o  year old male who presents with colon cancer screening  Family h/o colon cancer (sister at 39)  Review of Systems    Historical Information   Past Medical History:   Diagnosis Date    High cholesterol     Obstructive sleep apnea     Last Assessed:6/8/16     Past Surgical History:   Procedure Laterality Date    COLONOSCOPY       Social History   Social History     Substance and Sexual Activity   Alcohol Use No     Social History     Substance and Sexual Activity   Drug Use No     Social History     Tobacco Use   Smoking Status Never Smoker   Smokeless Tobacco Never Used     Family History   Problem Relation Age of Onset    Hypertension Mother     Lung cancer Father        Meds/Allergies     (Not in a hospital admission)      Allergies   Allergen Reactions    Seasonal Ic  [Cholestatin]        Objective     /82   Pulse 61   Temp 97 6 °F (36 4 °C) (Temporal)   Resp 18   Ht 6' 1" (1 854 m)   Wt 126 kg (277 lb 12 5 oz)   SpO2 95%   BMI 36 65 kg/m²       PHYSICAL EXAM    Gen: NAD  CV: RRR  CHEST: Clear  ABD: soft, NT/ND  EXT: no edema  Neuro: AAO      ASSESSMENT/PLAN:  This is a 62y o  year old male here for colonoscopy for colon cancer screening  PLAN:   Procedure: colonoscopy

## 2022-05-16 NOTE — ANESTHESIA PREPROCEDURE EVALUATION
Procedure:  COLONOSCOPY    Relevant Problems   CARDIO   (+) Mixed hyperlipidemia      GI/HEPATIC   (+) Gastroesophageal reflux disease      PULMONARY   (+) Obstructive sleep apnea treated with continuous positive airway pressure (CPAP)        Physical Exam    Airway    Mallampati score: II  TM Distance: >3 FB  Neck ROM: full     Dental   No notable dental hx     Cardiovascular  Cardiovascular exam normal    Pulmonary  Pulmonary exam normal     Other Findings        Anesthesia Plan  ASA Score- 2     Anesthesia Type- IV sedation with anesthesia with ASA Monitors  Additional Monitors:   Airway Plan:           Plan Factors-Exercise tolerance (METS): >4 METS  Chart reviewed  Patient is not a current smoker  Patient instructed to abstain from smoking on day of procedure  Patient did not smoke on day of surgery  Obstructive sleep apnea risk education given perioperatively  Induction- intravenous  Postoperative Plan-     Informed Consent- Anesthetic plan and risks discussed with patient

## 2022-07-07 ENCOUNTER — RA CDI HCC (OUTPATIENT)
Dept: OTHER | Facility: HOSPITAL | Age: 57
End: 2022-07-07

## 2022-07-07 NOTE — PROGRESS NOTES
[facility]  coding opportunities       Chart reviewed, no opportunity found: CHART REVIEWED, NO OPPORTUNITY FOUND        Patients Insurance        Commercial Insurance: [employer]

## 2022-07-13 ENCOUNTER — APPOINTMENT (OUTPATIENT)
Dept: LAB | Facility: IMAGING CENTER | Age: 57
End: 2022-07-13
Payer: COMMERCIAL

## 2022-07-13 DIAGNOSIS — R73.03 PREDIABETES: ICD-10-CM

## 2022-07-13 DIAGNOSIS — E78.2 MIXED HYPERLIPIDEMIA: ICD-10-CM

## 2022-07-13 DIAGNOSIS — K21.9 GASTROESOPHAGEAL REFLUX DISEASE: ICD-10-CM

## 2022-07-13 DIAGNOSIS — Z12.5 PROSTATE CANCER SCREENING: ICD-10-CM

## 2022-07-13 LAB
ALBUMIN SERPL BCP-MCNC: 3.8 G/DL (ref 3.5–5)
ALP SERPL-CCNC: 91 U/L (ref 46–116)
ALT SERPL W P-5'-P-CCNC: 52 U/L (ref 12–78)
ANION GAP SERPL CALCULATED.3IONS-SCNC: 1 MMOL/L (ref 4–13)
AST SERPL W P-5'-P-CCNC: 23 U/L (ref 5–45)
BILIRUB SERPL-MCNC: 0.74 MG/DL (ref 0.2–1)
BUN SERPL-MCNC: 13 MG/DL (ref 5–25)
CALCIUM SERPL-MCNC: 9.9 MG/DL (ref 8.3–10.1)
CHLORIDE SERPL-SCNC: 108 MMOL/L (ref 100–108)
CHOLEST SERPL-MCNC: 161 MG/DL
CO2 SERPL-SCNC: 31 MMOL/L (ref 21–32)
CREAT SERPL-MCNC: 0.98 MG/DL (ref 0.6–1.3)
ERYTHROCYTE [DISTWIDTH] IN BLOOD BY AUTOMATED COUNT: 12.3 % (ref 11.6–15.1)
GFR SERPL CREATININE-BSD FRML MDRD: 85 ML/MIN/1.73SQ M
GLUCOSE P FAST SERPL-MCNC: 106 MG/DL (ref 65–99)
HCT VFR BLD AUTO: 44 % (ref 36.5–49.3)
HDLC SERPL-MCNC: 45 MG/DL
HGB BLD-MCNC: 14.7 G/DL (ref 12–17)
LDLC SERPL CALC-MCNC: 91 MG/DL (ref 0–100)
MCH RBC QN AUTO: 30.2 PG (ref 26.8–34.3)
MCHC RBC AUTO-ENTMCNC: 33.4 G/DL (ref 31.4–37.4)
MCV RBC AUTO: 90 FL (ref 82–98)
PLATELET # BLD AUTO: 204 THOUSANDS/UL (ref 149–390)
PMV BLD AUTO: 10.2 FL (ref 8.9–12.7)
POTASSIUM SERPL-SCNC: 5 MMOL/L (ref 3.5–5.3)
PROT SERPL-MCNC: 7.9 G/DL (ref 6.4–8.2)
PSA SERPL-MCNC: 0.3 NG/ML (ref 0–4)
RBC # BLD AUTO: 4.87 MILLION/UL (ref 3.88–5.62)
SODIUM SERPL-SCNC: 140 MMOL/L (ref 136–145)
TRIGL SERPL-MCNC: 126 MG/DL
WBC # BLD AUTO: 5.35 THOUSAND/UL (ref 4.31–10.16)

## 2022-07-13 PROCEDURE — 80061 LIPID PANEL: CPT

## 2022-07-13 PROCEDURE — 85027 COMPLETE CBC AUTOMATED: CPT

## 2022-07-13 PROCEDURE — 36415 COLL VENOUS BLD VENIPUNCTURE: CPT

## 2022-07-13 PROCEDURE — 83036 HEMOGLOBIN GLYCOSYLATED A1C: CPT

## 2022-07-13 PROCEDURE — 80053 COMPREHEN METABOLIC PANEL: CPT

## 2022-07-13 PROCEDURE — G0103 PSA SCREENING: HCPCS

## 2022-07-14 LAB
EST. AVERAGE GLUCOSE BLD GHB EST-MCNC: 117 MG/DL
HBA1C MFR BLD: 5.7 %

## 2022-07-20 ENCOUNTER — OFFICE VISIT (OUTPATIENT)
Dept: INTERNAL MEDICINE CLINIC | Facility: OTHER | Age: 57
End: 2022-07-20
Payer: COMMERCIAL

## 2022-07-20 VITALS
BODY MASS INDEX: 36.18 KG/M2 | TEMPERATURE: 97.4 F | HEIGHT: 73 IN | DIASTOLIC BLOOD PRESSURE: 74 MMHG | HEART RATE: 74 BPM | WEIGHT: 273 LBS | OXYGEN SATURATION: 98 % | SYSTOLIC BLOOD PRESSURE: 118 MMHG

## 2022-07-20 DIAGNOSIS — G47.33 OBSTRUCTIVE SLEEP APNEA TREATED WITH CONTINUOUS POSITIVE AIRWAY PRESSURE (CPAP): Primary | ICD-10-CM

## 2022-07-20 DIAGNOSIS — Z99.89 OBSTRUCTIVE SLEEP APNEA TREATED WITH CONTINUOUS POSITIVE AIRWAY PRESSURE (CPAP): Primary | ICD-10-CM

## 2022-07-20 DIAGNOSIS — E78.5 HYPERLIPIDEMIA, UNSPECIFIED HYPERLIPIDEMIA TYPE: ICD-10-CM

## 2022-07-20 DIAGNOSIS — R73.03 PREDIABETES: ICD-10-CM

## 2022-07-20 DIAGNOSIS — K21.9 GASTROESOPHAGEAL REFLUX DISEASE: ICD-10-CM

## 2022-07-20 DIAGNOSIS — E78.2 MIXED HYPERLIPIDEMIA: ICD-10-CM

## 2022-07-20 DIAGNOSIS — E66.9 OBESITY (BMI 35.0-39.9 WITHOUT COMORBIDITY): ICD-10-CM

## 2022-07-20 PROCEDURE — 99214 OFFICE O/P EST MOD 30 MIN: CPT | Performed by: INTERNAL MEDICINE

## 2022-07-20 RX ORDER — OMEPRAZOLE 20 MG/1
20 CAPSULE, DELAYED RELEASE ORAL DAILY
Qty: 90 CAPSULE | Refills: 1 | Status: SHIPPED | OUTPATIENT
Start: 2022-07-20

## 2022-07-20 RX ORDER — ATORVASTATIN CALCIUM 20 MG/1
20 TABLET, FILM COATED ORAL DAILY
Qty: 90 TABLET | Refills: 1 | Status: SHIPPED | OUTPATIENT
Start: 2022-07-20

## 2022-07-20 NOTE — PROGRESS NOTES
Assessment/Plan:    1  Hyperlipidemia  Lipid profile is in acceptable range  Continue with Lipitor 20 mg daily along with low-fat diet and exercise  2  GERD  Doing well on omeprazole 20 mg daily  3  Prediabetes  Hemoglobin A1c is 5 7  Continue with low sugar/low carb diet  Will continue to monitor  Continue with the exercise  4  Obstructive sleep apnea  Doing well on CPAP machine and is well compliant     5  Obesity  Advised for local it diet and exercise   Diagnoses and all orders for this visit:    Obstructive sleep apnea treated with continuous positive airway pressure (CPAP)    Gastroesophageal reflux disease  -     omeprazole (PriLOSEC) 20 mg delayed release capsule; Take 1 capsule (20 mg total) by mouth daily  -     Basic metabolic panel; Future    Hyperlipidemia, unspecified hyperlipidemia type  -     atorvastatin (LIPITOR) 20 mg tablet; Take 1 tablet (20 mg total) by mouth daily  -     Lipid Panel with Direct LDL reflex; Future    Obesity (BMI 35 0-39 9 without comorbidity)    Mixed hyperlipidemia    Prediabetes               Subjective:          Patient ID: Bassam Santos is a 62 y o  male  Patient is here for regular follow-up  Also would like to discuss blood work results done last week  Otherwise no new complaints  The following portions of the patient's history were reviewed and updated as appropriate: allergies, current medications, past family history, past medical history, past social history, past surgical history and problem list     Review of Systems   Constitutional: Negative for fatigue and fever  HENT: Negative for congestion, ear discharge, ear pain, postnasal drip, sinus pressure, sore throat, tinnitus and trouble swallowing  Eyes: Negative for discharge, itching and visual disturbance  Respiratory: Negative for cough and shortness of breath  Cardiovascular: Negative for chest pain and palpitations     Gastrointestinal: Negative for abdominal pain, diarrhea, nausea and vomiting  Endocrine: Negative for cold intolerance and polyuria  Genitourinary: Negative for difficulty urinating, dysuria and urgency  Musculoskeletal: Positive for arthralgias  Negative for neck pain  Skin: Negative for rash  Allergic/Immunologic: Negative for environmental allergies  Neurological: Negative for dizziness, weakness and headaches  Psychiatric/Behavioral: Negative for agitation and behavioral problems  The patient is not nervous/anxious  Past Medical History:   Diagnosis Date    High cholesterol     Obstructive sleep apnea     Last Assessed:6/8/16         Current Outpatient Medications:     atorvastatin (LIPITOR) 20 mg tablet, Take 1 tablet (20 mg total) by mouth daily, Disp: 90 tablet, Rfl: 1    omeprazole (PriLOSEC) 20 mg delayed release capsule, Take 1 capsule (20 mg total) by mouth daily, Disp: 90 capsule, Rfl: 1    Allergies   Allergen Reactions    Seasonal Ic  [Cholestatin]        Social History   Past Surgical History:   Procedure Laterality Date    COLONOSCOPY       Family History   Problem Relation Age of Onset    Hypertension Mother     Lung cancer Father        Objective:  /74 (BP Location: Left arm, Patient Position: Sitting, Cuff Size: Large)   Pulse 74   Temp (!) 97 4 °F (36 3 °C) (Tympanic)   Ht 6' 1" (1 854 m)   Wt 124 kg (273 lb)   SpO2 98% Comment: ra  BMI 36 02 kg/m²   Body mass index is 36 02 kg/m²  Physical Exam  Constitutional:       Appearance: He is well-developed  He is obese  HENT:      Head: Normocephalic  Right Ear: Ear canal and external ear normal  There is no impacted cerumen  Left Ear: Ear canal and external ear normal  There is no impacted cerumen  Nose: No rhinorrhea  Mouth/Throat:      Pharynx: No posterior oropharyngeal erythema  Eyes:      General: No scleral icterus  Pupils: Pupils are equal, round, and reactive to light  Neck:      Thyroid: No thyromegaly        Trachea: No tracheal deviation  Cardiovascular:      Rate and Rhythm: Normal rate and regular rhythm  Heart sounds: Normal heart sounds  Pulmonary:      Effort: Pulmonary effort is normal  No respiratory distress  Breath sounds: Normal breath sounds  Chest:      Chest wall: No tenderness  Abdominal:      General: Bowel sounds are normal       Palpations: Abdomen is soft  There is no mass  Tenderness: There is no abdominal tenderness  Musculoskeletal:         General: Normal range of motion  Cervical back: Normal range of motion and neck supple  Right lower leg: No edema  Left lower leg: No edema  Lymphadenopathy:      Cervical: No cervical adenopathy  Skin:     General: Skin is warm  Findings: No erythema or rash  Neurological:      Mental Status: He is alert and oriented to person, place, and time  Cranial Nerves: No cranial nerve deficit     Psychiatric:         Mood and Affect: Mood normal          Behavior: Behavior normal

## 2023-01-23 ENCOUNTER — RA CDI HCC (OUTPATIENT)
Dept: OTHER | Facility: HOSPITAL | Age: 58
End: 2023-01-23

## 2023-01-23 NOTE — PROGRESS NOTES
[facility]  coding opportunities       Chart reviewed, no opportunity found: CHART REVIEWED, NO OPPORTUNITY FOUND        Patients Insurance        Commercial Insurance: [address] [place] [address]

## 2023-01-30 ENCOUNTER — OFFICE VISIT (OUTPATIENT)
Dept: INTERNAL MEDICINE CLINIC | Facility: OTHER | Age: 58
End: 2023-01-30

## 2023-01-30 VITALS
OXYGEN SATURATION: 99 % | BODY MASS INDEX: 37.11 KG/M2 | TEMPERATURE: 98.7 F | HEIGHT: 73 IN | SYSTOLIC BLOOD PRESSURE: 116 MMHG | HEART RATE: 68 BPM | WEIGHT: 280 LBS | DIASTOLIC BLOOD PRESSURE: 80 MMHG

## 2023-01-30 DIAGNOSIS — E66.9 OBESITY (BMI 35.0-39.9 WITHOUT COMORBIDITY): Primary | ICD-10-CM

## 2023-01-30 DIAGNOSIS — R73.03 PREDIABETES: ICD-10-CM

## 2023-01-30 DIAGNOSIS — E78.5 HYPERLIPIDEMIA, UNSPECIFIED HYPERLIPIDEMIA TYPE: ICD-10-CM

## 2023-01-30 DIAGNOSIS — E78.2 MIXED HYPERLIPIDEMIA: ICD-10-CM

## 2023-01-30 DIAGNOSIS — Z99.89 OBSTRUCTIVE SLEEP APNEA TREATED WITH CONTINUOUS POSITIVE AIRWAY PRESSURE (CPAP): ICD-10-CM

## 2023-01-30 DIAGNOSIS — K21.9 GASTROESOPHAGEAL REFLUX DISEASE: ICD-10-CM

## 2023-01-30 DIAGNOSIS — G47.33 OBSTRUCTIVE SLEEP APNEA TREATED WITH CONTINUOUS POSITIVE AIRWAY PRESSURE (CPAP): ICD-10-CM

## 2023-01-30 RX ORDER — OMEPRAZOLE 20 MG/1
20 CAPSULE, DELAYED RELEASE ORAL DAILY
Qty: 90 CAPSULE | Refills: 1 | Status: SHIPPED | OUTPATIENT
Start: 2023-01-30

## 2023-01-30 RX ORDER — ATORVASTATIN CALCIUM 20 MG/1
20 TABLET, FILM COATED ORAL DAILY
Qty: 90 TABLET | Refills: 1 | Status: SHIPPED | OUTPATIENT
Start: 2023-01-30

## 2023-01-30 NOTE — PROGRESS NOTES
Assessment/Plan:    Gastroesophageal reflux disease  Doing well on present dose of omeprazole 20 mg daily  Advised to continue with healthy diet and also lose weight    Obstructive sleep apnea treated with continuous positive airway pressure (CPAP)  Doing well on CPAP  Well compliant    Obesity (BMI 35 0-39 9 without comorbidity)  Again advised for low caloric diet and exercise as much as tolerated  Mixed hyperlipidemia  Lipid profile is in good range  Continue with atorvastatin 20 mg daily along with low-fat diet and exercise    Prediabetes  Fasting blood sugar is 97  Last hemoglobin A1c was 5 7  Advised for low carbohydrate/low sugar diet and exercise  We will check hemoglobin A1c before next visit       Diagnoses and all orders for this visit:    Obesity (BMI 35 0-39 9 without comorbidity)    Hyperlipidemia, unspecified hyperlipidemia type  -     atorvastatin (LIPITOR) 20 mg tablet; Take 1 tablet (20 mg total) by mouth daily  -     Lipid Panel with Direct LDL reflex; Future  -     Comprehensive metabolic panel; Future  -     CBC; Future    Gastroesophageal reflux disease  -     omeprazole (PriLOSEC) 20 mg delayed release capsule; Take 1 capsule (20 mg total) by mouth daily    Obstructive sleep apnea treated with continuous positive airway pressure (CPAP)    Mixed hyperlipidemia    Prediabetes  -     Hemoglobin A1C; Future          BMI Counseling: Body mass index is 36 94 kg/m²  The BMI is above normal  Nutrition recommendations include decreasing portion sizes, encouraging healthy choices of fruits and vegetables, decreasing fast food intake, consuming healthier snacks and limiting drinks that contain sugar  Exercise recommendations include exercising 3-5 times per week  No pharmacotherapy was ordered  Rationale for BMI follow-up plan is due to patient being overweight or obese  Depression Screening and Follow-up Plan: Patient was screened for depression during today's encounter   They screened negative with a PHQ-2 score of 0  Subjective:          Patient ID: Eagle Haider is a 62 y o  male  Patient is here for regular follow-up  Also have blood work done would like to discuss results  Otherwise no new complaints  The following portions of the patient's history were reviewed and updated as appropriate: allergies, current medications, past family history, past medical history, past social history, past surgical history and problem list     Review of Systems   Constitutional: Negative for fatigue and fever  HENT: Negative for congestion, ear discharge, ear pain, postnasal drip, sinus pressure, sore throat, tinnitus and trouble swallowing  Eyes: Negative for discharge, itching and visual disturbance  Respiratory: Negative for cough and shortness of breath  Cardiovascular: Negative for chest pain and palpitations  Gastrointestinal: Negative for abdominal pain, diarrhea, nausea and vomiting  Endocrine: Negative for cold intolerance and polyuria  Genitourinary: Negative for difficulty urinating, dysuria and urgency  Musculoskeletal: Positive for arthralgias  Negative for neck pain  Skin: Negative for rash  Allergic/Immunologic: Negative for environmental allergies  Neurological: Negative for dizziness, weakness and headaches  Psychiatric/Behavioral: Negative for agitation and behavioral problems  The patient is not nervous/anxious            Past Medical History:   Diagnosis Date   • High cholesterol    • Obstructive sleep apnea     Last Assessed:6/8/16         Current Outpatient Medications:   •  atorvastatin (LIPITOR) 20 mg tablet, Take 1 tablet (20 mg total) by mouth daily, Disp: 90 tablet, Rfl: 1  •  omeprazole (PriLOSEC) 20 mg delayed release capsule, Take 1 capsule (20 mg total) by mouth daily, Disp: 90 capsule, Rfl: 1    Allergies   Allergen Reactions   • Seasonal Ic  [Cholestatin]        Social History   Past Surgical History:   Procedure Laterality Date   • COLONOSCOPY       Family History   Problem Relation Age of Onset   • Hypertension Mother    • Lung cancer Father        Objective:  /80 (BP Location: Left arm, Patient Position: Sitting, Cuff Size: Large)   Pulse 68   Temp 98 7 °F (37 1 °C) (Temporal)   Ht 6' 1" (1 854 m)   Wt 127 kg (280 lb)   SpO2 99%   BMI 36 94 kg/m²   Body mass index is 36 94 kg/m²  Physical Exam  Constitutional:       Appearance: He is well-developed  He is obese  He is not diaphoretic  HENT:      Head: Normocephalic  Right Ear: External ear normal  There is no impacted cerumen  Left Ear: External ear normal  There is no impacted cerumen  Nose: No congestion or rhinorrhea  Mouth/Throat:      Pharynx: No posterior oropharyngeal erythema  Eyes:      General: No scleral icterus  Pupils: Pupils are equal, round, and reactive to light  Neck:      Thyroid: No thyromegaly  Trachea: No tracheal deviation  Cardiovascular:      Rate and Rhythm: Normal rate and regular rhythm  Heart sounds: Normal heart sounds  No murmur heard  Pulmonary:      Effort: Pulmonary effort is normal  No respiratory distress  Breath sounds: Normal breath sounds  Chest:      Chest wall: No tenderness  Abdominal:      General: Bowel sounds are normal       Palpations: Abdomen is soft  There is no mass  Tenderness: There is no abdominal tenderness  Musculoskeletal:         General: Normal range of motion  Cervical back: Normal range of motion and neck supple  Right lower leg: No edema  Left lower leg: No edema  Lymphadenopathy:      Cervical: No cervical adenopathy  Skin:     General: Skin is warm  Findings: No erythema or rash  Neurological:      Mental Status: He is alert and oriented to person, place, and time  Cranial Nerves: No cranial nerve deficit  Sensory: No sensory deficit     Psychiatric:         Mood and Affect: Mood normal          Behavior: Behavior normal  Thought Content:  Thought content normal

## 2023-01-30 NOTE — ASSESSMENT & PLAN NOTE
Doing well on present dose of omeprazole 20 mg daily    Advised to continue with healthy diet and also lose weight

## 2023-01-30 NOTE — ASSESSMENT & PLAN NOTE
Lipid profile is in good range    Continue with atorvastatin 20 mg daily along with low-fat diet and exercise

## 2023-01-30 NOTE — ASSESSMENT & PLAN NOTE
Fasting blood sugar is 97  Last hemoglobin A1c was 5 7  Advised for low carbohydrate/low sugar diet and exercise    We will check hemoglobin A1c before next visit

## 2023-02-13 DIAGNOSIS — E78.5 HYPERLIPIDEMIA, UNSPECIFIED HYPERLIPIDEMIA TYPE: ICD-10-CM

## 2023-02-13 NOTE — TELEPHONE ENCOUNTER
Pharmacy is requesting a new script because patient got new insurance  Can we please send a new script over for patient?

## 2023-02-14 RX ORDER — ATORVASTATIN CALCIUM 20 MG/1
20 TABLET, FILM COATED ORAL DAILY
Qty: 90 TABLET | Refills: 1 | Status: SHIPPED | OUTPATIENT
Start: 2023-02-14

## 2023-05-10 ENCOUNTER — OFFICE VISIT (OUTPATIENT)
Dept: SLEEP CENTER | Facility: CLINIC | Age: 58
End: 2023-05-10

## 2023-05-10 VITALS
BODY MASS INDEX: 35.78 KG/M2 | SYSTOLIC BLOOD PRESSURE: 138 MMHG | HEART RATE: 66 BPM | WEIGHT: 270 LBS | HEIGHT: 73 IN | DIASTOLIC BLOOD PRESSURE: 87 MMHG

## 2023-05-10 DIAGNOSIS — Z99.89 OBSTRUCTIVE SLEEP APNEA TREATED WITH CONTINUOUS POSITIVE AIRWAY PRESSURE (CPAP): Primary | ICD-10-CM

## 2023-05-10 DIAGNOSIS — G47.33 OBSTRUCTIVE SLEEP APNEA TREATED WITH CONTINUOUS POSITIVE AIRWAY PRESSURE (CPAP): Primary | ICD-10-CM

## 2023-05-10 NOTE — PROGRESS NOTES
Progress Note - Idris [age] y o  male   :1965, MRN: [id number]  5/10/2023        Follow Up Evaluation / Problem:  Severe Obstructive Sleep Apnea  Obesity      Thank you for the opportunity of participating in the evaluation and care of this patient in the Sleep Clinic at [facility]  HPI: Rex Persaud is a 62y o  year old male  The patient presents for follow up of severe obstructive sleep apnea  He completed a diagnostic sleep study in 2016, which indicated severe SAADIA with an AHI of 57 4, worsening to 81 8 in supine sleep, with an oxygen lazara of 61% and sat less than 90% for 107 9 minutes  He was set up with CPAP equipment on 7/15/2016  He reports that he uses it every night  His comorbid conditions include obesity, HTN, GERD  He is here today to review annual compliance and effectiveness of treatment  Current Outpatient Medications:   •  atorvastatin (LIPITOR) 20 mg tablet, Take 1 tablet (20 mg total) by mouth daily, Disp: 90 tablet, Rfl: 1  •  omeprazole (PriLOSEC) 20 mg delayed release capsule, Take 1 capsule (20 mg total) by mouth daily, Disp: 90 capsule, Rfl: 1    How likely are you to doze off or fall asleep in the following situations, in contrast to feeling just tired?   Sitting and reading: Would never doze  Watching TV: Moderate chance of dozing  Sitting, inactive in a public place (e g  a theatre or a meeting): Slight chance of dozing  As a passenger in a car for an hour without a break: Slight chance of dozing  Lying down to rest in the afternoon when circumstances permit: Would never doze  Sitting and talking to someone: Would never doze  Sitting quietly after a lunch without alcohol: Would never doze  In a car, while stopped for a few minutes in traffic: Would never doze  Total score: 4              Vitals:    05/10/23 0750   BP: 138/87   BP Location: Left arm   Patient "Position: Sitting   Cuff Size: Adult   Pulse: 66   Weight: 122 kg (270 lb)   Height: 6' 1\" (1 854 m)       Body mass index is 35 62 kg/m²  EPWORTH SLEEPINESS SCORE  Total score: 4      Past History Since Last Sleep Center Visit:   He denies any changes to his health since his last visit  He continues to use CPAP every night, using a nasal pillow mask  He reports that some nights his equipment doesn't turn on right away  It may take several attempts for it to turn on  This does not occur on a regular basis, but has occurred intermittently  He has not had it checked by his DME provider  He feels the current settings and mask are comfortable  He notices improvement in his sleep since using CPAP and doesn't sleep without it  The review of systems and following portions of the patient's history were reviewed and updated as appropriate: allergies, current medications, past family history, past medical history, past social history, past surgical history, and problem list         OBJECTIVE  Equipment set up date:  7/15/16 - received the DreamStation 2 replacement  PAP Pressure: Nasal CPAP set to deliver 12 cm of water pressure  Type of mask used: nasal pillow  DME Provider: [employer]    Physical Exam:     General Appearance:   Alert, cooperative, no distress, appears stated age, obese     Lungs:    Heart:  Clear to auscultation bilaterally, respirations unlabored      Regular rate and rhythm, S1 and S2 normal, no murmur, rub or gallop              ASSESSMENT / PLAN    1  Obstructive sleep apnea treated with continuous positive airway pressure (CPAP)  PAP DME Pressure Change    PAP DME Resupply/Reorder              Counseling / Coordination of Care  Total clinic time spent today 30 minutes  Greater than 50% of total time was spent with the patient and / or family counseling and / or coordination of care       A description of the counseling / coordination of care:     Impressions, Diagnostic results, " "Prognosis, Instructions for management, Risks and benefits of treatment, Patient and family education, Risk factor reductions and Importance of compliance with treatment    Today I reviewed the patient's compliance data  he has been able to use the equipment 100% of all days recorded  Average usage was 4 or more hours 100% of all days recorded  The estimated AHI is 8 3 abnormal breathing events per hour, improved from 12 at last evaluation  A pressure change was ordered, but did not take place  The obstructive events were only 3 4 with central events at 1 3  A pressure change to APAP 12-14cm has been ordered today for persistently elevated AHI  The patient feels they benefit from the use of PAP equipment and would like to continue PAP therapy  Response to treatment has been very good  A prescription has been provided to last for the next year  He will continue using this equipment at the settings noted above for the next 6 months  At that timehe will then return for a routine follow-up evaluation  I have asked the patient to contact the Sleep [address] [place] [address] if he encounters any difficulties prior to that time  The following instructions have been given to the patient today:    Patient Instructions   1  Continue use of CPAP equipment nightly  2  Call if you are having any difficulty turning your equipment on  You may need to bring it to [facility] to have them evaluate for \"broken beyond repair\"  2  Continue to clean your equipment, as discussed  3  Contact the Sleep [address] [place] [address] with any questions or concerns prior to your next visit, as needed  4  Schedule visit for follow-up in 6 months        Nursing Support:  When: Monday through Friday 7A-5PM except holidays  Where: Our direct line is [phone]  If you are having a true emergency please call 911  In the event that the line is busy or it is after hours please leave a voice message and we will return your call    Please " speak clearly, leaving your full name, birth date, best number to reach you and the reason for your call  Medication refills: We will need the name of the medication, the dosage, the ordering provider, whether you get a 30 or 90 day refill, and the pharmacy name and address  Medications will be ordered by the provider only  Nurses cannot call in prescriptions  Please allow 7 days for medication refills  Physician requested updates: If your provider requested that you call with an update after starting medication, please be ready to provide us the medication and dosage, what time you take your medication, the time you attempt to fall asleep, time you fall asleep, when you wake up, and what time you get out of bed  Sleep Study Results: We will contact you with sleep study results and/or next steps after the physician has reviewed your testing        Willy Sargent, [address] [place] [address]

## 2023-05-10 NOTE — PATIENT INSTRUCTIONS
"1   Continue use of CPAP equipment nightly  2  Call if you are having any difficulty turning your equipment on  You may need to bring it to [facility] to have them evaluate for \"broken beyond repair\"  2  Continue to clean your equipment, as discussed  3  Contact the Sleep [address] [place] [address] with any questions or concerns prior to your next visit, as needed  4  Schedule visit for follow-up in 6 months        Nursing Support:  When: Monday through Friday 7A-5PM except holidays  Where: Our direct line is [phone]  If you are having a true emergency please call 911  In the event that the line is busy or it is after hours please leave a voice message and we will return your call  Please speak clearly, leaving your full name, birth date, best number to reach you and the reason for your call  Medication refills: We will need the name of the medication, the dosage, the ordering provider, whether you get a 30 or 90 day refill, and the pharmacy name and address  Medications will be ordered by the provider only  Nurses cannot call in prescriptions  Please allow 7 days for medication refills  Physician requested updates: If your provider requested that you call with an update after starting medication, please be ready to provide us the medication and dosage, what time you take your medication, the time you attempt to fall asleep, time you fall asleep, when you wake up, and what time you get out of bed  Sleep Study Results: We will contact you with sleep study results and/or next steps after the physician has reviewed your testing      "

## 2023-05-11 ENCOUNTER — TELEPHONE (OUTPATIENT)
Dept: SLEEP CENTER | Facility: CLINIC | Age: 58
End: 2023-05-11

## 2023-05-12 ENCOUNTER — TELEPHONE (OUTPATIENT)
Dept: SLEEP CENTER | Facility: CLINIC | Age: 58
End: 2023-05-12

## 2023-05-12 LAB

## 2023-07-31 ENCOUNTER — APPOINTMENT (OUTPATIENT)
Dept: LAB | Facility: IMAGING CENTER | Age: 58
End: 2023-07-31
Payer: COMMERCIAL

## 2023-07-31 DIAGNOSIS — R73.03 PREDIABETES: ICD-10-CM

## 2023-07-31 DIAGNOSIS — E78.5 HYPERLIPIDEMIA, UNSPECIFIED HYPERLIPIDEMIA TYPE: ICD-10-CM

## 2023-07-31 LAB
ALBUMIN SERPL BCP-MCNC: 3.9 G/DL (ref 3.5–5)
ALP SERPL-CCNC: 88 U/L (ref 46–116)
ALT SERPL W P-5'-P-CCNC: 39 U/L (ref 12–78)
ANION GAP SERPL CALCULATED.3IONS-SCNC: 0 MMOL/L
AST SERPL W P-5'-P-CCNC: 20 U/L (ref 5–45)
BILIRUB SERPL-MCNC: 0.92 MG/DL (ref 0.2–1)
BUN SERPL-MCNC: 14 MG/DL (ref 5–25)
CALCIUM SERPL-MCNC: 9.3 MG/DL (ref 8.3–10.1)
CHLORIDE SERPL-SCNC: 112 MMOL/L (ref 96–108)
CHOLEST SERPL-MCNC: 142 MG/DL
CO2 SERPL-SCNC: 29 MMOL/L (ref 21–32)
CREAT SERPL-MCNC: 0.88 MG/DL (ref 0.6–1.3)
ERYTHROCYTE [DISTWIDTH] IN BLOOD BY AUTOMATED COUNT: 12.8 % (ref 11.6–15.1)
EST. AVERAGE GLUCOSE BLD GHB EST-MCNC: 117 MG/DL
GFR SERPL CREATININE-BSD FRML MDRD: 94 ML/MIN/1.73SQ M
GLUCOSE P FAST SERPL-MCNC: 95 MG/DL (ref 65–99)
HBA1C MFR BLD: 5.7 %
HCT VFR BLD AUTO: 43.7 % (ref 36.5–49.3)
HDLC SERPL-MCNC: 46 MG/DL
HGB BLD-MCNC: 14.7 G/DL (ref 12–17)
LDLC SERPL CALC-MCNC: 78 MG/DL (ref 0–100)
MCH RBC QN AUTO: 31.1 PG (ref 26.8–34.3)
MCHC RBC AUTO-ENTMCNC: 33.6 G/DL (ref 31.4–37.4)
MCV RBC AUTO: 93 FL (ref 82–98)
PLATELET # BLD AUTO: 216 THOUSANDS/UL (ref 149–390)
PMV BLD AUTO: 10.3 FL (ref 8.9–12.7)
POTASSIUM SERPL-SCNC: 4.5 MMOL/L (ref 3.5–5.3)
PROT SERPL-MCNC: 7.7 G/DL (ref 6.4–8.4)
RBC # BLD AUTO: 4.72 MILLION/UL (ref 3.88–5.62)
SODIUM SERPL-SCNC: 141 MMOL/L (ref 135–147)
TRIGL SERPL-MCNC: 90 MG/DL
WBC # BLD AUTO: 5.21 THOUSAND/UL (ref 4.31–10.16)

## 2023-07-31 PROCEDURE — 80053 COMPREHEN METABOLIC PANEL: CPT

## 2023-07-31 PROCEDURE — 83036 HEMOGLOBIN GLYCOSYLATED A1C: CPT

## 2023-07-31 PROCEDURE — 80061 LIPID PANEL: CPT

## 2023-07-31 PROCEDURE — 85027 COMPLETE CBC AUTOMATED: CPT

## 2023-07-31 PROCEDURE — 36415 COLL VENOUS BLD VENIPUNCTURE: CPT

## 2023-08-02 ENCOUNTER — OFFICE VISIT (OUTPATIENT)
Dept: INTERNAL MEDICINE CLINIC | Facility: OTHER | Age: 58
End: 2023-08-02
Payer: COMMERCIAL

## 2023-08-02 VITALS
HEIGHT: 73 IN | TEMPERATURE: 97.7 F | SYSTOLIC BLOOD PRESSURE: 110 MMHG | BODY MASS INDEX: 33.8 KG/M2 | DIASTOLIC BLOOD PRESSURE: 88 MMHG | OXYGEN SATURATION: 99 % | HEART RATE: 59 BPM | WEIGHT: 255 LBS

## 2023-08-02 DIAGNOSIS — E78.5 HYPERLIPIDEMIA, UNSPECIFIED HYPERLIPIDEMIA TYPE: ICD-10-CM

## 2023-08-02 DIAGNOSIS — K21.9 GASTROESOPHAGEAL REFLUX DISEASE: ICD-10-CM

## 2023-08-02 DIAGNOSIS — E66.9 OBESITY (BMI 35.0-39.9 WITHOUT COMORBIDITY): ICD-10-CM

## 2023-08-02 DIAGNOSIS — Z99.89 OBSTRUCTIVE SLEEP APNEA TREATED WITH CONTINUOUS POSITIVE AIRWAY PRESSURE (CPAP): Primary | ICD-10-CM

## 2023-08-02 DIAGNOSIS — M25.562 CHRONIC PAIN OF BOTH KNEES: ICD-10-CM

## 2023-08-02 DIAGNOSIS — R73.03 PREDIABETES: ICD-10-CM

## 2023-08-02 DIAGNOSIS — G47.33 OBSTRUCTIVE SLEEP APNEA TREATED WITH CONTINUOUS POSITIVE AIRWAY PRESSURE (CPAP): Primary | ICD-10-CM

## 2023-08-02 DIAGNOSIS — M25.561 CHRONIC PAIN OF BOTH KNEES: ICD-10-CM

## 2023-08-02 DIAGNOSIS — E78.2 MIXED HYPERLIPIDEMIA: ICD-10-CM

## 2023-08-02 DIAGNOSIS — G89.29 CHRONIC PAIN OF BOTH KNEES: ICD-10-CM

## 2023-08-02 PROCEDURE — 99214 OFFICE O/P EST MOD 30 MIN: CPT | Performed by: INTERNAL MEDICINE

## 2023-08-02 RX ORDER — OMEPRAZOLE 20 MG/1
20 CAPSULE, DELAYED RELEASE ORAL DAILY
Qty: 90 CAPSULE | Refills: 1 | Status: SHIPPED | OUTPATIENT
Start: 2023-08-02

## 2023-08-02 RX ORDER — ATORVASTATIN CALCIUM 20 MG/1
20 TABLET, FILM COATED ORAL DAILY
Qty: 90 TABLET | Refills: 1 | Status: SHIPPED | OUTPATIENT
Start: 2023-08-02

## 2023-08-02 NOTE — ASSESSMENT & PLAN NOTE
Lipid profile is in acceptable range.   Continue with low-fat diet and atorvastatin 20 mg daily along with exercise

## 2023-08-02 NOTE — ASSESSMENT & PLAN NOTE
Patient lost about 25 pounds since January 2023. Continue with low-carb/low sugar diet and low caloric diet along with exercise.

## 2023-08-02 NOTE — PROGRESS NOTES
Follow Up Office Visit      Encounter Date: 01/19/2023   Patient Name: Korey Rodriguez  YOB: 1943   Medical Record Number: [id number]   Primary Diagnosis: No primary diagnosis found.   Cancer Staging: Cancer Staging   No matching staging information was found for the patient.              Cancer Staging   No matching staging information was found for the patient.        Chief Complaint:  No chief complaint on file.      Oncologic History: Korey Rodriguez is a 79 y.o. male  here for consultation regarding his new diagnosis of bladder cancer. He presented with gross hematuria, prompting an evaluation by Dr. Westfall. He had a TURBT in 10/2022, which identified a large bleeding bladder tumor. 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 PET/CT 11/2022 was negative for metastatic disease. He was seen by Dr. Bansal, who performed a radical cystoprostatectomy with bilateral pelvic LN dissection and formation of an ileal conduit on 12/12/22. An omental pedical flap was also created. 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 Dr. Bansal reported extension of the primary tumor into the left periurethral adventitial fat, which extended to the level of the GAY. Final pathology from the bladder and prostate specimen identified a urothelial carcinoma with micropappilary features that had direct extension into the prostate and extensive CIS on the specimen. This was removed with an R1 resection, with microscopic disease identified at the left distal ureter. Left sided lymph nodes dissected were negative (external iliac 0/5, common iliac 0/4, obturator 0/2, presacral 0/1). Right obturator and presacral lymph nodes were negative. However 2/3 right external iliac nodes were involved and 2/3 right common iliac lymph nodes were involved.  He has been recovering well since his surgery.       Interval  Assessment/Plan:    Gastroesophageal reflux disease  Doing well with omeprazole 20 mg daily. Continue with healthy diet    Obstructive sleep apnea treated with continuous positive airway pressure (CPAP)  Well compliant with CPAP machine. Obesity (BMI 35.0-39.9 without comorbidity)  Patient lost about 25 pounds since January 2023. Continue with low-carb/low sugar diet and low caloric diet along with exercise. Mixed hyperlipidemia  Lipid profile is in acceptable range. Continue with low-fat diet and atorvastatin 20 mg daily along with exercise    Prediabetes  Hemoglobin A1c is 5.7. Continue with low sugar/low-carb diet and exercise    Chronic pain of both knees  Again advised to get x-ray requested on previous visit of both knees. Can take Tylenol for now as needed. Follow-up after x-ray of knees       Diagnoses and all orders for this visit:    Obstructive sleep apnea treated with continuous positive airway pressure (CPAP)    Gastroesophageal reflux disease  -     omeprazole (PriLOSEC) 20 mg delayed release capsule; Take 1 capsule (20 mg total) by mouth daily    Hyperlipidemia, unspecified hyperlipidemia type  -     atorvastatin (LIPITOR) 20 mg tablet; Take 1 tablet (20 mg total) by mouth daily    Obesity (BMI 35.0-39.9 without comorbidity)    Mixed hyperlipidemia    Prediabetes    Chronic pain of both knees               Subjective:          Patient ID: Radha Valdovinos is a 62 y.o. male. Patient is here for regular follow-up. Also have blood work done would like to discuss results. Otherwise no new complaints. Still with knee pain off and on. On previous visit x-ray of knee were requested but he never did it. Insect Bite  Associated symptoms include arthralgias. Pertinent negatives include no abdominal pain, chest pain, congestion, coughing, fatigue, fever, headaches, nausea, neck pain, rash, sore throat, vomiting or weakness.        The following portions of the patient's history were reviewed History: His case was discussed in tumor board. He is here to f/u     Prior Radiation: None    Subjective      Review of Systems: Review of Systems   Constitutional: Positive for fatigue (2/10, ongoing). Negative for appetite change and unexpected weight change.   HENT: Positive for tinnitus (ongoing). Negative for hearing loss, sore throat, trouble swallowing and voice change.    Eyes: Negative for visual disturbance.   Respiratory: Negative for cough and shortness of breath.    Cardiovascular: Negative for chest pain, palpitations and leg swelling.   Gastrointestinal: Positive for constipation (Takes an otc stool softener with relief.). Negative for diarrhea, nausea and vomiting.   Genitourinary: Negative for decreased urine volume.        Has urostomy with clear yellow urine.     Musculoskeletal: Positive for joint swelling (Slightly left ankle. New this morning.  ). Negative for arthralgias, back pain and gait problem.   Skin: Negative for color change and rash.   Neurological: Negative for dizziness, weakness and headaches.   Psychiatric/Behavioral: Negative for sleep disturbance.       The following portions of the patient's history were reviewed and updated as appropriate: allergies, current medications, past family history, past medical history, past social history, past surgical history and problem list.    Measures:   Pain: (on a scale of 0-10)   Pain Score    01/19/23 0958   PainSc: 0-No pain       Advanced Care Plan: N Advance Care Planning   ACP discussion was declined by the patient. Patient does not have an advance directive, declines further assistance.       KPS/Quality of Life: 80 - Restricted Physical Activity  ECOG: (1) Restricted in physically strenuous activity, ambulatory and able to do work of light nature  Depression Screening:    PHQ-9 score of 0 on 1/10/2023.      Objective     Physical Exam:   Vital Signs:   Vitals:    01/19/23 0958   BP: 148/79   Pulse: 91   Resp: 16   Temp: 98.5 °F  and updated as appropriate: allergies, current medications, past family history, past medical history, past social history, past surgical history and problem list.    Review of Systems   Constitutional: Negative for fatigue and fever. HENT: Negative for congestion, ear discharge, ear pain, postnasal drip, sinus pressure, sore throat, tinnitus and trouble swallowing. Eyes: Negative for discharge, itching and visual disturbance. Respiratory: Negative for cough and shortness of breath. Cardiovascular: Negative for chest pain and palpitations. Gastrointestinal: Negative for abdominal pain, diarrhea, nausea and vomiting. Endocrine: Negative for cold intolerance and polyuria. Genitourinary: Negative for difficulty urinating, dysuria and urgency. Musculoskeletal: Positive for arthralgias. Negative for neck pain. Skin: Negative for rash. Allergic/Immunologic: Negative for environmental allergies. Neurological: Negative for dizziness, weakness and headaches. Psychiatric/Behavioral: Negative for agitation and behavioral problems. The patient is not nervous/anxious.           Past Medical History:   Diagnosis Date   • High cholesterol    • Obstructive sleep apnea     Last Assessed:6/8/16         Current Outpatient Medications:   •  atorvastatin (LIPITOR) 20 mg tablet, Take 1 tablet (20 mg total) by mouth daily, Disp: 90 tablet, Rfl: 1  •  omeprazole (PriLOSEC) 20 mg delayed release capsule, Take 1 capsule (20 mg total) by mouth daily, Disp: 90 capsule, Rfl: 1    Allergies   Allergen Reactions   • Seasonal Ic  [Cholestatin]        Social History   Past Surgical History:   Procedure Laterality Date   • COLONOSCOPY       Family History   Problem Relation Age of Onset   • Hypertension Mother    • Lung cancer Father        Objective:  /88 (BP Location: Left arm, Patient Position: Sitting, Cuff Size: Adult)   Pulse 59   Temp 97.7 °F (36.5 °C) (Temporal)   Ht 6' 1" (1.854 m)   Wt 116 kg (255 lb) (36.9 °C)   TempSrc: Temporal   SpO2: 98%   Weight: 90.4 kg (199 lb 4.7 oz)   PainSc: 0-No pain     Body mass index is 27.56 kg/m².     Constitutional: No acute distress, sitting comfortably  Eye: EOMI, anicteric sclerae  HENT: NC/AT, MMM   Respiratory: Symmetric expansion, nonlabored respiration  MSK: ROM intact in all four extremities, no obvious deformities  Neuro: Alert, oriented x3, CN3-12 grossly intact.   Psych: Appropriate mood and affect.            Assessment / Plan        Assessment/Plan:     Diagnoses and all orders for this visit:    1. Malignant neoplasm of lateral wall of bladder (HCC)        Korey Rodriguez is a pleasant 79 y.o. male with a new diagnosis of a high grade [id number] micropapillary transitional cell carcinoma of the bladder managed with a radical cystoprostatectomy with bilateral pelvic LN dissection and formation of an ileal conduit on 12/12/22. An omental pedical flap was also created. He did not receive neoadjuvant cisplatin based therapy. Based on his pathologic findings, his team at [facility] recommended adjuvant chemotherapy and radiation. He is here to establish care closer to home.    We talked about radiation, which would be delivered with the intention of preventing local recurrence given the stage of disease and the microscopic positive margin. Per NCCN guidelines, radiation therapy can be considered as a category 2b recommendation for this situation.     His case was discussed in a multidisciplinary environment, with the consensus recommendation of sandwich therapy. He has seen Dr. Coburn and started his first cycle of gemcitabine and cisplatin. After completion of 2 cycles, we will do external beam RT, then move forward with additional chemo.  All questions were answered to their satisfaction today.  I will see him back in 1 month.     Follow Up:   No follow-ups on file.        Time:   I spent 35 minutes on this encounter today, 01/19/23. Activities that took place during this  SpO2 99%   BMI 33.64 kg/m²   Body mass index is 33.64 kg/m². Physical Exam  Constitutional:       Appearance: He is well-developed. He is obese. He is not toxic-appearing or diaphoretic. HENT:      Head: Normocephalic. Right Ear: External ear normal. There is no impacted cerumen. Left Ear: External ear normal. There is no impacted cerumen. Nose: No rhinorrhea. Mouth/Throat:      Pharynx: No posterior oropharyngeal erythema. Eyes:      General: No scleral icterus. Pupils: Pupils are equal, round, and reactive to light. Neck:      Thyroid: No thyromegaly. Trachea: No tracheal deviation. Cardiovascular:      Rate and Rhythm: Normal rate and regular rhythm. Heart sounds: Normal heart sounds. No murmur heard. Pulmonary:      Effort: Pulmonary effort is normal. No respiratory distress. Breath sounds: Normal breath sounds. Chest:      Chest wall: No tenderness. Abdominal:      General: Bowel sounds are normal.      Palpations: Abdomen is soft. There is no mass. Tenderness: There is no abdominal tenderness. Musculoskeletal:         General: Normal range of motion. Cervical back: Normal range of motion and neck supple. Right lower leg: No edema. Left lower leg: No edema. Lymphadenopathy:      Cervical: No cervical adenopathy. Skin:     General: Skin is warm. Neurological:      Mental Status: He is alert and oriented to person, place, and time. Cranial Nerves: No cranial nerve deficit.    Psychiatric:         Mood and Affect: Mood normal.         Behavior: Behavior normal. time include:   - preparing to see the patient  - obtaining and reviewing separately obtained history  - performing a medically appropriate examination and evaluation  - counseling and educating the patient  - communicating with other healthcare providers  - documenting clinical information in the health record  - coordinating care for this patient.     Sincerely,        Kimberly Melgoza MD  Radiation Oncology  [facility]    This document has been signed by Kimberly Melgoza MD on January 19, 2023 11:38 EST           NOTICE TO PATIENTS  At [facility], we believe that sharing information builds trust and better relationships. You are receiving this note because you recently visited [facility]. It is possible you will see health information before a provider has talked with you about it. This kind of information can be easy to misunderstand. To help you fully understand what it means for your health, we urge you to discuss this note with your provider.

## 2023-08-02 NOTE — ASSESSMENT & PLAN NOTE
Again advised to get x-ray requested on previous visit of both knees. Can take Tylenol for now as needed.   Follow-up after x-ray of knees

## 2023-11-21 ENCOUNTER — OFFICE VISIT (OUTPATIENT)
Dept: SLEEP CENTER | Facility: CLINIC | Age: 58
End: 2023-11-21
Payer: COMMERCIAL

## 2023-11-21 VITALS
HEIGHT: 73 IN | SYSTOLIC BLOOD PRESSURE: 122 MMHG | DIASTOLIC BLOOD PRESSURE: 78 MMHG | BODY MASS INDEX: 33.74 KG/M2 | WEIGHT: 254.6 LBS

## 2023-11-21 DIAGNOSIS — G47.33 OBSTRUCTIVE SLEEP APNEA TREATED WITH CONTINUOUS POSITIVE AIRWAY PRESSURE (CPAP): Primary | ICD-10-CM

## 2023-11-21 PROCEDURE — 99214 OFFICE O/P EST MOD 30 MIN: CPT | Performed by: NURSE PRACTITIONER

## 2023-11-21 NOTE — PATIENT INSTRUCTIONS
1.  Continue use of CPAP equipment nightly  2. Continue to clean your equipment, as discussed  3. Contact the Sleep [facility] with any questions or concerns prior to your next visit, as needed  4. Schedule visit for follow-up in 1 year       Nursing Support:  When: Monday through Friday 7A-5PM except holidays  Where: Our direct line is [phone]. If you are having a true emergency please call 911. In the event that the line is busy or it is after hours please leave a voice message and we will return your call. Please speak clearly, leaving your full name, birth date, best number to reach you and the reason for your call. Medication refills: We will need the name of the medication, the dosage, the ordering provider, whether you get a 30 or 90 day refill, and the pharmacy name and address. Medications will be ordered by the provider only. Nurses cannot call in prescriptions. Please allow 7 days for medication refills. Physician requested updates: If your provider requested that you call with an update after starting medication, please be ready to provide us the medication and dosage, what time you take your medication, the time you attempt to fall asleep, time you fall asleep, when you wake up, and what time you get out of bed. Sleep Study Results: We will contact you with sleep study results and/or next steps after the physician has reviewed your testing.

## 2023-11-21 NOTE — PROGRESS NOTES
Progress Note - [address] 62 y.o. male   :1965, MRN: [id number]  2023        Follow Up Evaluation / Problem:  Severe Obstructive Sleep Apnea  Obesity      Thank you for the opportunity of participating in the evaluation and care of this patient in the Sleep Clinic at [address] [place] [address]      HPI: Brendan Thorne is a 62y.o. year old male. The patient presents for follow up of severe obstructive sleep apnea. He completed a diagnostic sleep study in , which indicated severe SAADIA with an AHI of 57.4, worsening to 81.8 in supine sleep, with an oxygen lazara of 61% and sat less than 90% for 107.9 minutes. He was set up with CPAP equipment on 7/15/2016. His comorbid conditions include obesity, HTN, GERD. He is here today to review annual compliance and effectiveness of treatment. Current Outpatient Medications:     atorvastatin (LIPITOR) 20 mg tablet, Take 1 tablet (20 mg total) by mouth daily, Disp: 90 tablet, Rfl: 1    omeprazole (PriLOSEC) 20 mg delayed release capsule, Take 1 capsule (20 mg total) by mouth daily, Disp: 90 capsule, Rfl: 1    How likely are you to doze off or fall asleep in the following situations, in contrast to feeling just tired? Sitting and reading: Would never doze  Watching TV: Would never doze  Sitting, inactive in a public place (e.g. a theatre or a meeting): Slight chance of dozing  As a passenger in a car for an hour without a break: Slight chance of dozing  Lying down to rest in the afternoon when circumstances permit: Would never doze  Sitting and talking to someone: Would never doze  Sitting quietly after a lunch without alcohol: Would never doze  In a car, while stopped for a few minutes in traffic: Would never doze  Total score: 2              Vitals:    23 0848   BP: 122/78   Weight: 115 kg (254 lb 9.6 oz)   Height: 6' 1" (1.854 m)       Body mass index is 33.59 kg/m². EPWORTH SLEEPINESS SCORE  Total score: 2      Past History Since Last Sleep Center Visit:   He denies any changes to his health since his last visit. He continues to use CPAP every night, using a nasal pillow mask. He feels the settings and mask are comfortable with improvement after the pressure change made at his last visit. Some nights he experiences dry nose and mouth. He has the humidifier settings to the maximum humidity. He notices improvement in his sleep since using CPAP and doesn't sleep without it. He also mentions some chronic back pain that disrupts his sleep at times. The review of systems and following portions of the patient's history were reviewed and updated as appropriate: allergies, current medications, past family history, past medical history, past social history, past surgical history, and problem list.        OBJECTIVE  Equipment set up date:  7/15/16 - received the DreamStation 2 replacement  PAP Pressure: APAP 12-14cm  Type of mask used: nasal pillow  DME Provider: [employer]    Physical Exam:     General Appearance:   Alert, cooperative, no distress, appears stated age, obese     Lungs:    Heart:  Clear to auscultation bilaterally, respirations unlabored      Regular rate and rhythm, S1 and S2 normal, no murmur, rub or gallop              ASSESSMENT / PLAN    1. Obstructive sleep apnea treated with continuous positive airway pressure (CPAP)  PAP DME Resupply/Reorder              Counseling / Coordination of Care  I have spent a total time of 25 minutes on 11/21/23 in caring for this patient including Risks and benefits of tx options, Instructions for management, Patient and family education, Importance of tx compliance, Risk factor reductions, Impressions, Counseling / Coordination of care, Documenting in the medical record, and Reviewing / ordering tests, medicine, procedures  . Today I reviewed the patient's compliance data.   he has been able to use the equipment 100% of all days recorded. Average usage was 4 or more hours 96.7% of all days recorded. The estimated AHI is 3.4 abnormal breathing events per hour, improved from 8 at last evaluation. The patient feels they benefit from the use of PAP equipment and would like to continue PAP therapy. Response to treatment has been very good. A prescription has been provided to last for the next year. He will continue using this equipment at the settings noted above for the next 12 months. At that timehe will then return for a routine follow-up evaluation. I have asked the patient to contact the Sleep [address] [place] [address] if he encounters any difficulties prior to that time. The following instructions have been given to the patient today:    Patient Instructions   1. Continue use of CPAP equipment nightly  2. Continue to clean your equipment, as discussed  3. Contact the Sleep [address] [place] [address] with any questions or concerns prior to your next visit, as needed  4. Schedule visit for follow-up in 1 year       Nursing Support:  When: Monday through Friday 7A-5PM except holidays  Where: Our direct line is [phone]. If you are having a true emergency please call 911. In the event that the line is busy or it is after hours please leave a voice message and we will return your call. Please speak clearly, leaving your full name, birth date, best number to reach you and the reason for your call. Medication refills: We will need the name of the medication, the dosage, the ordering provider, whether you get a 30 or 90 day refill, and the pharmacy name and address. Medications will be ordered by the provider only. Nurses cannot call in prescriptions. Please allow 7 days for medication refills. Physician requested updates:  If your provider requested that you call with an update after starting medication, please be ready to provide us the medication and dosage, what time you take your medication, the time you attempt to fall asleep, time you fall asleep, when you wake up, and what time you get out of bed. Sleep Study Results: We will contact you with sleep study results and/or next steps after the physician has reviewed your testing.       Carlota Huggins, [address] [facility]

## 2023-11-22 ENCOUNTER — TELEPHONE (OUTPATIENT)
Dept: SLEEP CENTER | Facility: CLINIC | Age: 58
End: 2023-11-22

## 2023-11-27 LAB

## 2024-02-12 ENCOUNTER — RA CDI HCC (OUTPATIENT)
Dept: OTHER | Facility: HOSPITAL | Age: 59
End: 2024-02-12

## 2024-02-12 DIAGNOSIS — E78.5 HYPERLIPIDEMIA, UNSPECIFIED HYPERLIPIDEMIA TYPE: ICD-10-CM

## 2024-02-12 DIAGNOSIS — K21.9 GASTROESOPHAGEAL REFLUX DISEASE: ICD-10-CM

## 2024-02-12 RX ORDER — OMEPRAZOLE 20 MG/1
20 CAPSULE, DELAYED RELEASE ORAL DAILY
Qty: 90 CAPSULE | Refills: 0 | Status: SHIPPED | OUTPATIENT
Start: 2024-02-12

## 2024-02-12 RX ORDER — ATORVASTATIN CALCIUM 20 MG/1
20 TABLET, FILM COATED ORAL DAILY
Qty: 90 TABLET | Refills: 0 | Status: SHIPPED | OUTPATIENT
Start: 2024-02-12

## 2024-02-12 NOTE — TELEPHONE ENCOUNTER
Patient needs the following medications called into the below requested pharmacies:    Omeprazole - [facility] in [place] for 90 day supply  Atorvastatin - [facility] in [place] for 90 day supply

## 2024-02-21 ENCOUNTER — OFFICE VISIT (OUTPATIENT)
Dept: INTERNAL MEDICINE CLINIC | Facility: OTHER | Age: 59
End: 2024-02-21
Payer: COMMERCIAL

## 2024-02-21 VITALS
HEIGHT: 73 IN | WEIGHT: 251 LBS | SYSTOLIC BLOOD PRESSURE: 120 MMHG | DIASTOLIC BLOOD PRESSURE: 86 MMHG | TEMPERATURE: 97.8 F | BODY MASS INDEX: 33.27 KG/M2 | HEART RATE: 75 BPM | OXYGEN SATURATION: 100 %

## 2024-02-21 DIAGNOSIS — Z12.5 PROSTATE CANCER SCREENING: ICD-10-CM

## 2024-02-21 DIAGNOSIS — R73.03 PREDIABETES: ICD-10-CM

## 2024-02-21 DIAGNOSIS — G89.29 CHRONIC PAIN OF BOTH KNEES: ICD-10-CM

## 2024-02-21 DIAGNOSIS — E78.2 MIXED HYPERLIPIDEMIA: ICD-10-CM

## 2024-02-21 DIAGNOSIS — M25.562 CHRONIC PAIN OF BOTH KNEES: ICD-10-CM

## 2024-02-21 DIAGNOSIS — K21.9 GASTROESOPHAGEAL REFLUX DISEASE, UNSPECIFIED WHETHER ESOPHAGITIS PRESENT: Primary | ICD-10-CM

## 2024-02-21 DIAGNOSIS — G47.33 OBSTRUCTIVE SLEEP APNEA TREATED WITH CONTINUOUS POSITIVE AIRWAY PRESSURE (CPAP): ICD-10-CM

## 2024-02-21 DIAGNOSIS — M25.561 CHRONIC PAIN OF BOTH KNEES: ICD-10-CM

## 2024-02-21 DIAGNOSIS — E66.9 OBESITY (BMI 35.0-39.9 WITHOUT COMORBIDITY): ICD-10-CM

## 2024-02-21 PROCEDURE — 99214 OFFICE O/P EST MOD 30 MIN: CPT | Performed by: INTERNAL MEDICINE

## 2024-02-21 NOTE — ASSESSMENT & PLAN NOTE
X-ray requested on previous visit patient never went for x-rays.  Advised him to do x-ray if he want to have any further follow-up with the orthopedic surgeon.  But at present he is not interested.

## 2024-02-21 NOTE — PROGRESS NOTES
Assessment/Plan:    Gastroesophageal reflux disease  Doing well on omeprazole 20 mg daily.  Advised for healthy diet and weight loss.    Obstructive sleep apnea treated with continuous positive airway pressure (CPAP)  Well compliant to CPAP machine and doing well.  Also advised for weight loss    Mixed hyperlipidemia  Continue with atorvastatin 20 mg daily along with low-fat diet and exercise.  Will check lipid profile before next visit    Obesity (BMI 35.0-39.9 without comorbidity)  Advised for low-carb diet and exercise.    Prediabetes  Continue with low sugar/low-carb diet.  Will check hemoglobin A1c before next visit    Chronic pain of both knees  X-ray requested on previous visit patient never went for x-rays.  Advised him to do x-ray if he want to have any further follow-up with the orthopedic surgeon.  But at present he is not interested.       Diagnoses and all orders for this visit:    Gastroesophageal reflux disease, unspecified whether esophagitis present  -     CBC; Future  -     Comprehensive metabolic panel; Future    Prediabetes  -     Hemoglobin A1C; Future    Mixed hyperlipidemia  -     Lipid Panel with Direct LDL reflex; Future  -     Comprehensive metabolic panel; Future    Prostate cancer screening  -     PSA, Total Screen; Future    Obstructive sleep apnea treated with continuous positive airway pressure (CPAP)    Obesity (BMI 35.0-39.9 without comorbidity)    Chronic pain of both knees            Depression Screening and Follow-up Plan: Patient was screened for depression during today's encounter. They screened negative with a PHQ-2 score of 0.        Subjective:          Patient ID: Sg Gomez is a 58 y.o. male.    Patient is here for follow-up.  No blood work prior to this visit.  Still complaining of bilateral knee discomfort.  But never went for x-ray as requested on previous visit.        The following portions of the patient's history were reviewed and updated as appropriate:  "allergies, current medications, past family history, past medical history, past social history, past surgical history, and problem list.    Review of Systems   Constitutional:  Negative for fatigue and fever.   HENT:  Negative for congestion, ear discharge, ear pain, postnasal drip, sinus pressure, sore throat, tinnitus and trouble swallowing.    Eyes:  Negative for discharge, itching and visual disturbance.   Respiratory:  Negative for cough and shortness of breath.    Cardiovascular:  Negative for chest pain and palpitations.   Gastrointestinal:  Negative for abdominal pain, diarrhea, nausea and vomiting.   Endocrine: Negative for cold intolerance and polyuria.   Genitourinary:  Negative for difficulty urinating, dysuria and urgency.   Musculoskeletal:  Positive for arthralgias. Negative for neck pain.   Skin:  Negative for rash.   Allergic/Immunologic: Negative for environmental allergies.   Neurological:  Negative for dizziness, weakness and headaches.   Psychiatric/Behavioral:  Negative for agitation and behavioral problems. The patient is not nervous/anxious.          Past Medical History:   Diagnosis Date    High cholesterol     Obstructive sleep apnea     Last Assessed:6/8/16         Current Outpatient Medications:     atorvastatin (LIPITOR) 20 mg tablet, Take 1 tablet (20 mg total) by mouth daily, Disp: 90 tablet, Rfl: 0    omeprazole (PriLOSEC) 20 mg delayed release capsule, Take 1 capsule (20 mg total) by mouth daily, Disp: 90 capsule, Rfl: 0    Allergies   Allergen Reactions    Seasonal Ic  [Cholestatin]        Social History   Past Surgical History:   Procedure Laterality Date    COLONOSCOPY       Family History   Problem Relation Age of Onset    Hypertension Mother     Lung cancer Father        Objective:  /86 (BP Location: Left arm, Patient Position: Sitting, Cuff Size: Large)   Pulse 75   Temp 97.8 °F (36.6 °C) (Temporal)   Ht 6' 1\" (1.854 m)   Wt 114 kg (251 lb)   SpO2 100% Comment: ra  " BMI 33.12 kg/m²   Body mass index is 33.12 kg/m².     Physical Exam  Constitutional:       General: He is not in acute distress.     Appearance: He is well-developed. He is obese.   HENT:      Head: Normocephalic.      Right Ear: External ear normal. There is no impacted cerumen.      Left Ear: External ear normal. There is no impacted cerumen.      Nose: No congestion or rhinorrhea.      Mouth/Throat:      Pharynx: No posterior oropharyngeal erythema.   Eyes:      General: No scleral icterus.     Pupils: Pupils are equal, round, and reactive to light.   Neck:      Thyroid: No thyromegaly.      Trachea: No tracheal deviation.   Cardiovascular:      Rate and Rhythm: Normal rate and regular rhythm.      Heart sounds: Normal heart sounds. No murmur heard.  Pulmonary:      Effort: Pulmonary effort is normal. No respiratory distress.      Breath sounds: Normal breath sounds.   Chest:      Chest wall: No tenderness.   Abdominal:      General: Bowel sounds are normal.      Palpations: Abdomen is soft. There is no mass.      Tenderness: There is no abdominal tenderness.   Musculoskeletal:         General: Normal range of motion.      Cervical back: Normal range of motion and neck supple. No rigidity.      Right lower leg: No edema.      Left lower leg: No edema.   Lymphadenopathy:      Cervical: No cervical adenopathy.   Skin:     General: Skin is warm.      Findings: No erythema or rash.   Neurological:      Mental Status: He is alert and oriented to person, place, and time.      Cranial Nerves: No cranial nerve deficit.      Gait: Gait normal.      Deep Tendon Reflexes: Reflexes normal.   Psychiatric:         Mood and Affect: Mood normal.         Behavior: Behavior normal.

## 2024-02-21 NOTE — ASSESSMENT & PLAN NOTE
Continue with atorvastatin 20 mg daily along with low-fat diet and exercise.  Will check lipid profile before next visit

## 2024-04-23 DIAGNOSIS — E78.5 HYPERLIPIDEMIA, UNSPECIFIED HYPERLIPIDEMIA TYPE: ICD-10-CM

## 2024-04-23 DIAGNOSIS — K21.9 GASTROESOPHAGEAL REFLUX DISEASE: ICD-10-CM

## 2024-04-23 RX ORDER — ATORVASTATIN CALCIUM 20 MG/1
20 TABLET, FILM COATED ORAL DAILY
Qty: 90 TABLET | Refills: 1 | Status: SHIPPED | OUTPATIENT
Start: 2024-04-23

## 2024-04-23 RX ORDER — OMEPRAZOLE 20 MG/1
20 CAPSULE, DELAYED RELEASE ORAL DAILY
Qty: 90 CAPSULE | Refills: 1 | Status: SHIPPED | OUTPATIENT
Start: 2024-04-23

## 2024-07-18 DIAGNOSIS — K21.9 GASTROESOPHAGEAL REFLUX DISEASE: ICD-10-CM

## 2024-07-18 RX ORDER — OMEPRAZOLE 20 MG/1
20 CAPSULE, DELAYED RELEASE ORAL DAILY
Qty: 90 CAPSULE | Refills: 1 | Status: SHIPPED | OUTPATIENT
Start: 2024-07-18

## 2024-08-20 ENCOUNTER — APPOINTMENT (OUTPATIENT)
Dept: LAB | Facility: IMAGING CENTER | Age: 59
End: 2024-08-20
Payer: COMMERCIAL

## 2024-08-20 DIAGNOSIS — E78.2 MIXED HYPERLIPIDEMIA: ICD-10-CM

## 2024-08-20 DIAGNOSIS — R73.03 PREDIABETES: ICD-10-CM

## 2024-08-20 DIAGNOSIS — Z12.5 PROSTATE CANCER SCREENING: ICD-10-CM

## 2024-08-20 DIAGNOSIS — K21.9 GASTROESOPHAGEAL REFLUX DISEASE, UNSPECIFIED WHETHER ESOPHAGITIS PRESENT: ICD-10-CM

## 2024-08-20 LAB
ALBUMIN SERPL BCG-MCNC: 4.2 G/DL (ref 3.5–5)
ALP SERPL-CCNC: 75 U/L (ref 34–104)
ALT SERPL W P-5'-P-CCNC: 21 U/L (ref 7–52)
ANION GAP SERPL CALCULATED.3IONS-SCNC: 6 MMOL/L (ref 4–13)
AST SERPL W P-5'-P-CCNC: 18 U/L (ref 13–39)
BILIRUB SERPL-MCNC: 0.78 MG/DL (ref 0.2–1)
BUN SERPL-MCNC: 13 MG/DL (ref 5–25)
CALCIUM SERPL-MCNC: 9.6 MG/DL (ref 8.4–10.2)
CHLORIDE SERPL-SCNC: 104 MMOL/L (ref 96–108)
CHOLEST SERPL-MCNC: 143 MG/DL
CO2 SERPL-SCNC: 29 MMOL/L (ref 21–32)
CREAT SERPL-MCNC: 0.79 MG/DL (ref 0.6–1.3)
ERYTHROCYTE [DISTWIDTH] IN BLOOD BY AUTOMATED COUNT: 12.2 % (ref 11.6–15.1)
EST. AVERAGE GLUCOSE BLD GHB EST-MCNC: 114 MG/DL
GFR SERPL CREATININE-BSD FRML MDRD: 98 ML/MIN/1.73SQ M
GLUCOSE P FAST SERPL-MCNC: 91 MG/DL (ref 65–99)
HBA1C MFR BLD: 5.6 %
HCT VFR BLD AUTO: 44.7 % (ref 36.5–49.3)
HDLC SERPL-MCNC: 43 MG/DL
HGB BLD-MCNC: 14.7 G/DL (ref 12–17)
LDLC SERPL CALC-MCNC: 73 MG/DL (ref 0–100)
MCH RBC QN AUTO: 30.9 PG (ref 26.8–34.3)
MCHC RBC AUTO-ENTMCNC: 32.9 G/DL (ref 31.4–37.4)
MCV RBC AUTO: 94 FL (ref 82–98)
PLATELET # BLD AUTO: 198 THOUSANDS/UL (ref 149–390)
PMV BLD AUTO: 10.2 FL (ref 8.9–12.7)
POTASSIUM SERPL-SCNC: 4.5 MMOL/L (ref 3.5–5.3)
PROT SERPL-MCNC: 7.6 G/DL (ref 6.4–8.4)
PSA SERPL-MCNC: 0.71 NG/ML (ref 0–4)
RBC # BLD AUTO: 4.75 MILLION/UL (ref 3.88–5.62)
SODIUM SERPL-SCNC: 139 MMOL/L (ref 135–147)
TRIGL SERPL-MCNC: 137 MG/DL
WBC # BLD AUTO: 4.86 THOUSAND/UL (ref 4.31–10.16)

## 2024-08-20 PROCEDURE — 36415 COLL VENOUS BLD VENIPUNCTURE: CPT

## 2024-08-20 PROCEDURE — 80053 COMPREHEN METABOLIC PANEL: CPT

## 2024-08-20 PROCEDURE — 83036 HEMOGLOBIN GLYCOSYLATED A1C: CPT

## 2024-08-20 PROCEDURE — 80061 LIPID PANEL: CPT

## 2024-08-20 PROCEDURE — 85027 COMPLETE CBC AUTOMATED: CPT

## 2024-08-20 PROCEDURE — G0103 PSA SCREENING: HCPCS

## 2024-08-28 ENCOUNTER — OFFICE VISIT (OUTPATIENT)
Dept: INTERNAL MEDICINE CLINIC | Facility: OTHER | Age: 59
End: 2024-08-28
Payer: COMMERCIAL

## 2024-08-28 VITALS
BODY MASS INDEX: 33.48 KG/M2 | HEART RATE: 63 BPM | DIASTOLIC BLOOD PRESSURE: 80 MMHG | TEMPERATURE: 98.6 F | HEIGHT: 73 IN | SYSTOLIC BLOOD PRESSURE: 118 MMHG | OXYGEN SATURATION: 98 % | WEIGHT: 252.6 LBS

## 2024-08-28 DIAGNOSIS — G89.29 CHRONIC PAIN OF LEFT KNEE: ICD-10-CM

## 2024-08-28 DIAGNOSIS — R73.03 PREDIABETES: ICD-10-CM

## 2024-08-28 DIAGNOSIS — G89.29 CHRONIC LEFT SHOULDER PAIN: ICD-10-CM

## 2024-08-28 DIAGNOSIS — M25.512 CHRONIC LEFT SHOULDER PAIN: ICD-10-CM

## 2024-08-28 DIAGNOSIS — E78.2 MIXED HYPERLIPIDEMIA: ICD-10-CM

## 2024-08-28 DIAGNOSIS — M25.562 CHRONIC PAIN OF LEFT KNEE: ICD-10-CM

## 2024-08-28 DIAGNOSIS — E78.5 HYPERLIPIDEMIA, UNSPECIFIED HYPERLIPIDEMIA TYPE: ICD-10-CM

## 2024-08-28 DIAGNOSIS — G47.33 OBSTRUCTIVE SLEEP APNEA TREATED WITH CONTINUOUS POSITIVE AIRWAY PRESSURE (CPAP): Primary | ICD-10-CM

## 2024-08-28 DIAGNOSIS — E66.9 OBESITY (BMI 35.0-39.9 WITHOUT COMORBIDITY): ICD-10-CM

## 2024-08-28 DIAGNOSIS — K21.9 GASTROESOPHAGEAL REFLUX DISEASE, UNSPECIFIED WHETHER ESOPHAGITIS PRESENT: ICD-10-CM

## 2024-08-28 PROCEDURE — 99214 OFFICE O/P EST MOD 30 MIN: CPT | Performed by: INTERNAL MEDICINE

## 2024-08-28 RX ORDER — ATORVASTATIN CALCIUM 20 MG/1
20 TABLET, FILM COATED ORAL DAILY
Qty: 90 TABLET | Refills: 1 | Status: SHIPPED | OUTPATIENT
Start: 2024-08-28

## 2024-08-28 NOTE — ASSESSMENT & PLAN NOTE
Lipid profile is in acceptable range.  Continue with present dose of atorvastatin along with low-fat diet and exercise

## 2024-08-28 NOTE — ASSESSMENT & PLAN NOTE
Again x-ray left knee order and refer patient to orthopedic surgeon for further management.  In the past patient never did x-rays as ordered

## 2024-08-28 NOTE — ASSESSMENT & PLAN NOTE
Likely a rotator cuff bursitis/tendinitis.  Will request x-ray left shoulder and also refer patient for physical therapy.  If no improvement will refer to orthopedic surgeon

## 2024-08-28 NOTE — PROGRESS NOTES
Assessment/Plan:    Obstructive sleep apnea treated with continuous positive airway pressure (CPAP)  Continue with CPAP.    Mixed hyperlipidemia  Lipid profile is in acceptable range.  Continue with present dose of atorvastatin along with low-fat diet and exercise    Obesity (BMI 35.0-39.9 without comorbidity)  Continue with low caloric diet.    Prediabetes  Hemoglobin A1c now is a 5.6.  Continue with low sugar/low-carb diet.    Chronic pain of left knee  Again x-ray left knee order and refer patient to orthopedic surgeon for further management.  In the past patient never did x-rays as ordered    Chronic left shoulder pain  Likely a rotator cuff bursitis/tendinitis.  Will request x-ray left shoulder and also refer patient for physical therapy.  If no improvement will refer to orthopedic surgeon       Diagnoses and all orders for this visit:    Obstructive sleep apnea treated with continuous positive airway pressure (CPAP)    Hyperlipidemia, unspecified hyperlipidemia type  -     atorvastatin (LIPITOR) 20 mg tablet; Take 1 tablet (20 mg total) by mouth daily    Gastroesophageal reflux disease, unspecified whether esophagitis present    Mixed hyperlipidemia    Obesity (BMI 35.0-39.9 without comorbidity)    Prediabetes    Chronic pain of left knee  -     Ambulatory Referral to Orthopedic Surgery; Future  -     XR knee 3 vw left non injury; Future    Chronic left shoulder pain  -     Ambulatory Referral to Physical Therapy; Future  -     XR shoulder 2+ vw left; Future            Depression Screening and Follow-up Plan: Patient was screened for depression during today's encounter. They screened negative with a PHQ-2 score of 0.        Subjective:          Patient ID: Sg Gomez is a 59 y.o. male.    Patient is here for follow-up.  Also blood work done would like to discuss results.  Also complaining of left shoulder pain especially with elevation of the shoulder for the last few months gradually getting worse and  left knee pain is also getting worse        The following portions of the patient's history were reviewed and updated as appropriate: allergies, current medications, past family history, past medical history, past social history, past surgical history, and problem list.    Review of Systems   Constitutional:  Negative for fatigue and fever.   HENT:  Negative for congestion, ear discharge, ear pain, postnasal drip, sinus pressure, sore throat, tinnitus and trouble swallowing.    Eyes:  Negative for discharge, itching and visual disturbance.   Respiratory:  Negative for cough and shortness of breath.    Cardiovascular:  Negative for chest pain and palpitations.   Gastrointestinal:  Positive for constipation. Negative for abdominal pain, diarrhea, nausea and vomiting.   Endocrine: Negative for cold intolerance and polyuria.   Genitourinary:  Negative for difficulty urinating, dysuria and urgency.   Musculoskeletal:  Positive for arthralgias. Negative for neck pain.   Skin:  Negative for color change and rash.   Allergic/Immunologic: Negative for environmental allergies.   Neurological:  Negative for dizziness, weakness and headaches.   Psychiatric/Behavioral:  Negative for agitation and behavioral problems. The patient is not nervous/anxious.          Past Medical History:   Diagnosis Date    High cholesterol     Obstructive sleep apnea     Last Assessed:6/8/16         Current Outpatient Medications:     atorvastatin (LIPITOR) 20 mg tablet, Take 1 tablet (20 mg total) by mouth daily, Disp: 90 tablet, Rfl: 1    omeprazole (PriLOSEC) 20 mg delayed release capsule, TAKE 1 CAPSULE BY MOUTH EVERY DAY, Disp: 90 capsule, Rfl: 1    Allergies   Allergen Reactions    Seasonal Ic  [Cholestatin]        Social History   Past Surgical History:   Procedure Laterality Date    COLONOSCOPY       Family History   Problem Relation Age of Onset    Hypertension Mother     Lung cancer Father        Objective:  /80 (BP Location: Left  "arm, Patient Position: Sitting, Cuff Size: Large)   Pulse 63   Temp 98.6 °F (37 °C) (Tympanic)   Ht 6' 1\" (1.854 m)   Wt 115 kg (252 lb 9.6 oz)   SpO2 98% Comment: ra  BMI 33.33 kg/m²   Body mass index is 33.33 kg/m².     Physical Exam  Constitutional:       General: He is not in acute distress.     Appearance: He is well-developed.   HENT:      Head: Normocephalic.      Right Ear: External ear normal. There is no impacted cerumen.      Left Ear: External ear normal. There is no impacted cerumen.      Nose: No congestion or rhinorrhea.      Mouth/Throat:      Pharynx: No oropharyngeal exudate or posterior oropharyngeal erythema.   Eyes:      General: No scleral icterus.     Pupils: Pupils are equal, round, and reactive to light.   Neck:      Thyroid: No thyromegaly.      Trachea: No tracheal deviation.   Cardiovascular:      Rate and Rhythm: Normal rate and regular rhythm.      Heart sounds: Normal heart sounds. No murmur heard.     No gallop.   Pulmonary:      Effort: Pulmonary effort is normal. No respiratory distress.      Breath sounds: Normal breath sounds.   Chest:      Chest wall: No tenderness.   Abdominal:      General: Bowel sounds are normal.      Palpations: Abdomen is soft. There is no mass.      Tenderness: There is no abdominal tenderness.   Musculoskeletal:         General: Normal range of motion.      Cervical back: Normal range of motion and neck supple. No rigidity.      Right lower leg: No edema.      Left lower leg: No edema.   Lymphadenopathy:      Cervical: No cervical adenopathy.   Skin:     General: Skin is warm.      Findings: No erythema or rash.   Neurological:      Mental Status: He is alert and oriented to person, place, and time.      Cranial Nerves: No cranial nerve deficit.   Psychiatric:         Mood and Affect: Mood normal.         Behavior: Behavior normal.                   "

## 2024-09-18 DIAGNOSIS — M25.562 LEFT KNEE PAIN, UNSPECIFIED CHRONICITY: Primary | ICD-10-CM

## 2024-09-23 ENCOUNTER — OFFICE VISIT (OUTPATIENT)
Dept: OBGYN CLINIC | Facility: MEDICAL CENTER | Age: 59
End: 2024-09-23
Payer: COMMERCIAL

## 2024-09-23 ENCOUNTER — APPOINTMENT (OUTPATIENT)
Dept: RADIOLOGY | Facility: MEDICAL CENTER | Age: 59
End: 2024-09-23
Payer: COMMERCIAL

## 2024-09-23 VITALS
DIASTOLIC BLOOD PRESSURE: 80 MMHG | BODY MASS INDEX: 34.59 KG/M2 | WEIGHT: 261 LBS | SYSTOLIC BLOOD PRESSURE: 125 MMHG | HEIGHT: 73 IN | HEART RATE: 73 BPM

## 2024-09-23 DIAGNOSIS — M25.512 LEFT SHOULDER PAIN, UNSPECIFIED CHRONICITY: ICD-10-CM

## 2024-09-23 DIAGNOSIS — M75.22 BICEPS TENDONITIS ON LEFT: ICD-10-CM

## 2024-09-23 DIAGNOSIS — M75.82 ROTATOR CUFF TENDONITIS, LEFT: Primary | ICD-10-CM

## 2024-09-23 PROCEDURE — 73030 X-RAY EXAM OF SHOULDER: CPT

## 2024-09-23 PROCEDURE — 99203 OFFICE O/P NEW LOW 30 MIN: CPT | Performed by: ORTHOPAEDIC SURGERY

## 2024-09-23 NOTE — PROGRESS NOTES
"Orthopaedic Surgery - Office Note  Sg Gomez (59 y.o. male)   : 1965   MRN: [id number]  Encounter Date: 2024    Assessment / Plan    Left shoulder rotator cuff tendonitis and biceps tendonitis.  PT for shoulder, rotator cuff and scapular stabilizers strengthening and ROM. Script provided.  We discussed possible diagnostic CSI of the shoulder.  If no improvement with conservative treatment (PT) we discussed ordering and MRI to evaluate for RTC tear.   Follow-up:  Return in about 6 weeks (around 2024) for Recheck with Dr. Hawkins.      Chief Complaint / Date of Onset  Left shoulder pain  Injury Mechanism / Date  None  Pain for 2 years  Surgery / Date  None    History of Present Illness   Sg Gomez is a 59 y.o. male who presents for left shoulder pain referred to Dr. Hawkins by his PCP. Patient is left hand dominant. Patient has history of pre-diabetes. Patient reports chronic shoulder pain for 2 years. No known injury, prior injuries, or surgeries. Pain is 8/10 anterior shoulder pain that worsens at night and weather change. Patient reports cracking in the shoulder that helps his pain. Patient has distal numbness or tingling along the pinky side of his hand with \"minimal\" neck pain, no \"zingers\".    Treatment Summary  Medications / Modalities  None  Bracing / Immobilization  None  Physical Therapy  None  Injections  None  Prior Surgeries  None  Other Treatments  None    Employment / Current Status  Owns [employer] business, current    Sport / Organization / Current Status  [employer], current      Review of Systems  Pertinent items are noted in HPI.  All other systems were reviewed and are negative.      Physical Exam  /80   Pulse 73   Ht 6' 1\" (1.854 m)   Wt 118 kg (261 lb)   BMI 34.43 kg/m²   Cons: Appears well.  No apparent distress.  Psych: Alert. Oriented x3.  Mood and affect normal.  Eyes: PERRLA, EOMI  Resp: Normal effort.  No audible wheezing or stridor.  CV: Palpable " pulse.  No discernable arrhythmia.  No LE edema.  Lymph:  No palpable cervical, axillary, or inguinal lymphadenopathy.  Skin: Warm.  No palpable masses.  No visible lesions.  Neuro: Normal muscle tone.  Normal and symmetric DTR's.     Left Shoulder Exam  Alignment / Posture:  Normal shoulder posture.  Inspection:  No swelling. No edema. No erythema. No ecchymosis.  Palpation:   Mild tenderness at bicipital groove.  ROM:  Shoulder . Shoulder ER 60. Shoulder IR T8.  Strength:  Supraspinatus 5-/5. Infraspinatus 4/5. Subscapularis 5/5.  Stability:  Not tested.  Tests: (+) Crane. (+) Neer. (+) Speed. (+) Beauregard.  Neurovascular:  Sensation intact in Ax/R/M/U nerve distributions. 2+ radial pulse.        Studies Reviewed  I have personally reviewed pertinent films in PACS.  XR of left shoulder - 09/23/2024 - Minimal glenohumeral osteoarthritis. Mild AC osteoarthritis. No acute fracture or dislocation.      Procedures   No procedures performed today.    Medical, Surgical, Family, and Social History  The patient's medical history, family history, and social history, were reviewed and updated as appropriate.    Past Medical History:   Diagnosis Date    High cholesterol     Obstructive sleep apnea     Last Assessed:6/8/16       Past Surgical History:   Procedure Laterality Date    COLONOSCOPY         Family History   Problem Relation Age of Onset    Hypertension Mother     Lung cancer Father        Social History     Occupational History    Not on file   Tobacco Use    Smoking status: Never    Smokeless tobacco: Never   Vaping Use    Vaping status: Never Used   Substance and Sexual Activity    Alcohol use: No    Drug use: No    Sexual activity: Not Currently       Allergies   Allergen Reactions    Seasonal Ic  [Cholestatin]          Current Outpatient Medications:     atorvastatin (LIPITOR) 20 mg tablet, Take 1 tablet (20 mg total) by mouth daily, Disp: 90 tablet, Rfl: 1    omeprazole (PriLOSEC) 20 mg delayed release  capsule, TAKE 1 CAPSULE BY MOUTH EVERY DAY, Disp: 90 capsule, Rfl: 1      Gerri Dobson    Scribe Attestation      I,:  Gerri Dobson am acting as a scribe while in the presence of the attending physician.:       I,:  Manuel Hawkins MD personally performed the services described in this documentation    as scribed in my presence.:

## 2024-10-01 ENCOUNTER — OFFICE VISIT (OUTPATIENT)
Dept: OBGYN CLINIC | Facility: HOSPITAL | Age: 59
End: 2024-10-01
Payer: COMMERCIAL

## 2024-10-01 ENCOUNTER — HOSPITAL ENCOUNTER (OUTPATIENT)
Dept: RADIOLOGY | Facility: HOSPITAL | Age: 59
Discharge: HOME | End: 2024-10-01
Attending: ORTHOPAEDIC SURGERY
Payer: COMMERCIAL

## 2024-10-01 VITALS
BODY MASS INDEX: 33.93 KG/M2 | WEIGHT: 256 LBS | SYSTOLIC BLOOD PRESSURE: 137 MMHG | DIASTOLIC BLOOD PRESSURE: 91 MMHG | HEIGHT: 73 IN | HEART RATE: 63 BPM

## 2024-10-01 DIAGNOSIS — M25.562 LEFT KNEE PAIN, UNSPECIFIED CHRONICITY: Primary | ICD-10-CM

## 2024-10-01 DIAGNOSIS — M17.12 PRIMARY OSTEOARTHRITIS OF LEFT KNEE: ICD-10-CM

## 2024-10-01 DIAGNOSIS — M25.562 LEFT KNEE PAIN, UNSPECIFIED CHRONICITY: ICD-10-CM

## 2024-10-01 PROCEDURE — 73562 X-RAY EXAM OF KNEE 3: CPT

## 2024-10-01 PROCEDURE — 20610 DRAIN/INJ JOINT/BURSA W/O US: CPT | Performed by: ORTHOPAEDIC SURGERY

## 2024-10-01 PROCEDURE — 99213 OFFICE O/P EST LOW 20 MIN: CPT | Performed by: ORTHOPAEDIC SURGERY

## 2024-10-01 RX ORDER — BETAMETHASONE SODIUM PHOSPHATE AND BETAMETHASONE ACETATE 3; 3 MG/ML; MG/ML
12 INJECTION, SUSPENSION INTRA-ARTICULAR; INTRALESIONAL; INTRAMUSCULAR; SOFT TISSUE
Status: COMPLETED | OUTPATIENT
Start: 2024-10-01 | End: 2024-10-01

## 2024-10-01 RX ORDER — LIDOCAINE HYDROCHLORIDE 10 MG/ML
2 INJECTION, SOLUTION INFILTRATION; PERINEURAL
Status: COMPLETED | OUTPATIENT
Start: 2024-10-01 | End: 2024-10-01

## 2024-10-01 RX ADMIN — LIDOCAINE HYDROCHLORIDE 2 ML: 10 INJECTION, SOLUTION INFILTRATION; PERINEURAL at 07:45

## 2024-10-01 RX ADMIN — BETAMETHASONE SODIUM PHOSPHATE AND BETAMETHASONE ACETATE 12 MG: 3; 3 INJECTION, SUSPENSION INTRA-ARTICULAR; INTRALESIONAL; INTRAMUSCULAR; SOFT TISSUE at 07:45

## 2024-10-01 NOTE — PROGRESS NOTES
59 y.o.male is seen in the office today to evaluate longstanding weightbearing pain in both knees left far greater than right.  He has pain level of both knee joint, the pain is made worse bearing weight, pain increases with increased activities.  Current pain levels rate at least 7 out of 10 in both knees left being greater than right.  He does have a history of a concussive event to his both knees following a fall from a roof that was caving in 20 years ago, but none recent    Review of Systems  Review of systems negative unless otherwise specified in HPI    Past Medical History  Past Medical History:   Diagnosis Date    High cholesterol     Obstructive sleep apnea     Last Assessed:6/8/16       Past Surgical History  Past Surgical History:   Procedure Laterality Date    COLONOSCOPY         Current Medications  Current Outpatient Medications on File Prior to Visit   Medication Sig Dispense Refill    atorvastatin (LIPITOR) 20 mg tablet Take 1 tablet (20 mg total) by mouth daily 90 tablet 1    omeprazole (PriLOSEC) 20 mg delayed release capsule TAKE 1 CAPSULE BY MOUTH EVERY DAY 90 capsule 1     No current facility-administered medications on file prior to visit.       Recent Labs (HCT,HGB,PT,INR,ESR,CRP,GLU,HgA1C)  0   Lab Value Date/Time    HCT 44.7 08/20/2024 0919    HGB 14.7 08/20/2024 0919    WBC 4.86 08/20/2024 0919    HGBA1C 5.6 08/20/2024 0919    HGBA1C 5.9 (H) 06/25/2021 0810         Physical exam  General: Awake, Alert, Oriented  Eyes: Pupils equal, round and reactive to light  Heart: regular rate and rhythm  Lungs: No audible wheezing  Abdomen: soft  Gait pattern is without antalgia.  Neither hip is motion loss.  Each knee is in varus.  Neither knees effusion.  Each she has intact soft tissue envelope.  Each knee has bony enlargement and tenderness medially.  Each he has crepitation flexion-extension.  Knee that is palp warmth of the synovium    Imaging  I have personally reviewed standing x-rays of the  left knee and my interpretation as follows:  Near bone-on-bone apposition is seen within the medial and patellofemoral compartments of the left knee    Procedure  An injection of corticosteroid is provided for the left knee joint.  It is documented below        Large joint arthrocentesis: L knee  Universal Protocol:  Consent given by: patient  Supporting Documentation  Indications: pain   Procedure Details  Location: knee - L knee  Needle size: 22 G  Medications administered: 2 mL lidocaine 1 %; 12 mg betamethasone acetate-betamethasone sodium phosphate 6 (3-3) mg/mL    Patient tolerance: patient tolerated the procedure well with no immediate complications  Dressing:  Sterile dressing applied            Assessment/Plan:   59 y.o.male who has bilateral knee pain occurring in the setting of varus inclination and clinical evidence of osteoarthritis in both knees.  Radiographically he has moderate to advanced osteoarthritis left knee.  His symptoms include both pain and dysfunction.  All diagnoses were discussed with the patient.  Treatment option clued risk, benefits, return discussed in detail.  Wellness was discussed.  Perioperative counseling was undertaken.  An injection of corticosteroid is indicated for pain relief purposes.  It is advised, accepted at the administered as outlined above to the left knee joint.  Office follow-up in 4 weeks time.  The patient wishes to have this period of time during which she can arrange work schedules and determine the finances and being out of work following left knee replacement

## 2024-10-09 ENCOUNTER — EVALUATION (OUTPATIENT)
Dept: PHYSICAL THERAPY | Facility: REHABILITATION | Age: 59
End: 2024-10-09
Payer: COMMERCIAL

## 2024-10-09 DIAGNOSIS — M25.512 LEFT SHOULDER PAIN, UNSPECIFIED CHRONICITY: ICD-10-CM

## 2024-10-09 DIAGNOSIS — M75.22 BICEPS TENDONITIS ON LEFT: ICD-10-CM

## 2024-10-09 DIAGNOSIS — M75.82 ROTATOR CUFF TENDONITIS, LEFT: ICD-10-CM

## 2024-10-09 PROCEDURE — 97110 THERAPEUTIC EXERCISES: CPT | Performed by: PHYSICAL THERAPIST

## 2024-10-09 PROCEDURE — 97162 PT EVAL MOD COMPLEX 30 MIN: CPT | Performed by: PHYSICAL THERAPIST

## 2024-10-09 NOTE — PROGRESS NOTES
PT Evaluation     Today's date: 10/9/2024  Patient name: Sg Gomez  : 1965  MRN: [id number]  Referring provider: Manuel Hawkins, *  Dx:   Encounter Diagnosis     ICD-10-CM    1. Left shoulder pain, unspecified chronicity  M25.512 Ambulatory Referral to Physical Therapy      2. Rotator cuff tendonitis, left  M75.82 Ambulatory Referral to Physical Therapy      3. Biceps tendonitis on left  M75.22 Ambulatory Referral to Physical Therapy                     Assessment  Impairments: abnormal or restricted ROM, activity intolerance, impaired physical strength, lacks appropriate home exercise program, pain with function and scapular dyskinesis  Symptom irritability: moderate    Assessment details: Pt is a 60 yo male with chronic left shoulder pain.  He presents with scapular and mild RC weakness along with restrictions to joint mobility most notably internal rotation.  He would benefit from therapeutic exercises to increase strength and flexibility and improve shoulder mechanics for decreased pain and improved function.   Barriers to therapy: financial  Understanding of Dx/Px/POC: excellent     Prognosis: excellent    Goals  STG: in 2 weeks  Pt performing HEP consistently  ROM improved 10 degrees in IR  Increase strength 1/2 grade  LTG; by d/c  Able to reach overhead without significant pain  Strength WNL  Independent with HEP.     Plan  Patient would benefit from: skilled physical therapy  Referral necessary: No    Planned therapy interventions: neuromuscular re-education, manual therapy, joint mobilization, strengthening, stretching, therapeutic activities, therapeutic exercise and home exercise program    Plan details: Pt agreed to come back in 2 weeks for f/u and may come an additional time after that.          Subjective Evaluation    History of Present Illness  Mechanism of injury: Patient has had left shoulder pain for a couple years. 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     Pain is intermittent but he will always feel a cracking and pain with elevating his arm.     Patient Goals  Patient goals for therapy: decreased pain    Pain  Current pain ratin  At best pain ratin  At worst pain ratin  Location: anterior shoulder pain  Quality: dull ache and sharp  Alleviating factors: topical creams.  Exacerbated by: night, change in weather, reaching.    Social Support    Employment status: working  Hand dominance: left  Life stress: construction work          Objective     Tenderness     Left Shoulder   Tenderness in the biceps tendon (proximal) and supraspinatus tendon.     Cervical/Thoracic Screen   Cervical range of motion within normal limits with the following exceptions: Mild to moderate limitations to cervical extension, rotation and SB. Equal R and L  Mechanical assessment: no change or symptoms with retraction and extension    Active Range of Motion   Left Shoulder   Flexion: 140 degrees   Extension: 75 degrees   Abduction: 160 degrees   External rotation BTH: T2   Internal rotation BTB: L2     Right Shoulder   Flexion: 150 degrees   Extension: 75 degrees   Abduction: 130 degrees with pain  External rotation BTH: T3   Internal rotation BTB: T7 with pain    Passive Range of Motion   Left Shoulder   Flexion: WFL and with pain  Extension: WFL  Abduction: WFL and with pain  External rotation 90°: WFL  Internal rotation 90°: 45 degrees with pain    Right Shoulder   Normal passive range of motion    Scapular Mobility   Left Shoulder     Scapular Mobility beyond 90° FF   Scapular elevation: moderate    Right Shoulder     Scapular Mobility beyond 90° FF   Scapular elevation: minimal  Upward rotation: inadequate    Joint Play   Left Shoulder  Hypomobile in the posterior capsule and inferior capsule.    Right Shoulder  Joints within functional limits are the posterior capsule and inferior capsule.     Strength/Myotome Testing     Left Shoulder     Planes of Motion   Flexion: 4  "  Abduction: 4   External rotation at 0°: 4- (pain)   Internal rotation at 0°: 5     Isolated Muscles   Biceps: 5   Triceps: 5     Right Shoulder     Planes of Motion   Flexion: 5   Abduction: 5   External rotation at 0°: 4   Internal rotation at 0°: 5     Isolated Muscles   Biceps: 5   Triceps: 5     Tests     Left Shoulder   Positive Hawkin's and Neer's.   Negative AC shear and Speed's.     Right Shoulder   Negative AC shear, Hawkin's, Neer's and Speed's.              Precautions:   Patient Active Problem List   Diagnosis    Obstructive sleep apnea treated with continuous positive airway pressure (CPAP)    Obesity (BMI 35.0-39.9 without comorbidity)    Mixed hyperlipidemia    Gastroesophageal reflux disease    Hyperglycemia    Prediabetes    Left knee pain    Chronic left shoulder pain         Daily Treatment Diary     Assessment 10/9            Eval/Reval             FOTO     **     **   HEP Issued              Manuals             GH mobs: inf, post                                                    Exercise Diary                           rows GTB 5\"x15            B TB ER GTB 5\"x15            Strap IR  x10            TB shoulder ext To begin in 1 week            TB shoulder flex B To begin in 1 week            Prone horiz abd             Prone flex             Wall push ups                                                                                                        Modalities                                       Access Code: [email or web address]  URL: [email or web address]  Date: 10/09/2024  Prepared by: Jazmin Hidalgo    Exercises  - Standing Bilateral Low Shoulder Row with Anchored Resistance  - 2 x daily - 7 x weekly - 1-2 sets - 15 reps - 5 sec hold  - Shoulder External Rotation and Scapular Retraction with Resistance  - 2 x daily - 7 x weekly - 1-2 sets - 15 reps - 5 sec hold  - Standing Shoulder Internal Rotation Stretch with Towel  - 2 x daily - 7 x weekly - 1 sets - 10 reps  - Shoulder extension " with resistance - Neutral  - 1 x daily - 7 x weekly - 1-2 sets - 15 reps - 5 sec hold  - Shoulder Flexion with Anterior Anchored Resistance  - 1 x daily - 7 x weekly - 1-2 sets - 15 reps

## 2024-10-23 ENCOUNTER — OFFICE VISIT (OUTPATIENT)
Dept: PHYSICAL THERAPY | Facility: REHABILITATION | Age: 59
End: 2024-10-23
Payer: COMMERCIAL

## 2024-10-23 DIAGNOSIS — M75.22 BICEPS TENDONITIS ON LEFT: ICD-10-CM

## 2024-10-23 DIAGNOSIS — M75.82 ROTATOR CUFF TENDONITIS, LEFT: ICD-10-CM

## 2024-10-23 DIAGNOSIS — M25.512 LEFT SHOULDER PAIN, UNSPECIFIED CHRONICITY: Primary | ICD-10-CM

## 2024-10-23 PROCEDURE — 97112 NEUROMUSCULAR REEDUCATION: CPT | Performed by: PHYSICAL THERAPIST

## 2024-10-23 PROCEDURE — 97140 MANUAL THERAPY 1/> REGIONS: CPT | Performed by: PHYSICAL THERAPIST

## 2024-10-23 PROCEDURE — 97110 THERAPEUTIC EXERCISES: CPT | Performed by: PHYSICAL THERAPIST

## 2024-10-23 NOTE — PROGRESS NOTES
"Daily Note     Today's date: 10/23/2024  Patient name: Sg Gomez  : 1965  MRN: [id number]  Referring provider: Manuel Hawkins, *  Dx:   Encounter Diagnosis     ICD-10-CM    1. Left shoulder pain, unspecified chronicity  M25.512       2. Rotator cuff tendonitis, left  M75.82       3. Biceps tendonitis on left  M75.22                      Subjective: Pt reports doing his HEP daily.  His shoulders crack frequently.       Objective: See treatment diary below      Assessment: Tolerated treatment well. ROM in flexion and abduction are now equal to the right.  IR is still tight.  Exercises are fatiguing.  Advised pt to continue with HEP and call in 2-3 weeks if he was continuing to have some symptoms to get back in. He wished to minimize therapy due to grisel copayment.  Patient would benefit from continued PT      Plan: Continue per plan of care.        Precautions:   Patient Active Problem List   Diagnosis    Obstructive sleep apnea treated with continuous positive airway pressure (CPAP)    Obesity (BMI 35.0-39.9 without comorbidity)    Mixed hyperlipidemia    Gastroesophageal reflux disease    Hyperglycemia    Prediabetes    Left knee pain    Chronic left shoulder pain         Daily Treatment Diary     Assessment 10/9 10/23           Eval/Reval             FOTO     **     **   HEP Issued              Manuals             GH mobs: inf, post  NT                                                  Exercise Diary                           rows GTB 5\"x15 GTB 5\"x20           B TB ER GTB 5\"x15 GTB 5\"x15           Strap IR  x10 x10           TB shoulder ext To begin in 1 week GTB 5\"x20           TB shoulder flex B To begin in 1 week GTB 5\" 2x10           Prone horiz abd  5\"x10           Prone flex  5\"x10           Push up w/plus on knees  x10                                                                                                      Modalities                                           "

## 2024-10-29 ENCOUNTER — OFFICE VISIT (OUTPATIENT)
Dept: OBGYN CLINIC | Facility: HOSPITAL | Age: 59
End: 2024-10-29
Payer: COMMERCIAL

## 2024-10-29 VITALS
WEIGHT: 257 LBS | DIASTOLIC BLOOD PRESSURE: 75 MMHG | SYSTOLIC BLOOD PRESSURE: 120 MMHG | HEART RATE: 71 BPM | BODY MASS INDEX: 34.06 KG/M2 | HEIGHT: 73 IN

## 2024-10-29 DIAGNOSIS — M25.562 LEFT KNEE PAIN, UNSPECIFIED CHRONICITY: ICD-10-CM

## 2024-10-29 DIAGNOSIS — M17.12 PRIMARY OSTEOARTHRITIS OF LEFT KNEE: Primary | ICD-10-CM

## 2024-10-29 PROCEDURE — 99214 OFFICE O/P EST MOD 30 MIN: CPT | Performed by: ORTHOPAEDIC SURGERY

## 2024-10-29 RX ORDER — CEFAZOLIN SODIUM 2 G/50ML
2000 SOLUTION INTRAVENOUS ONCE
OUTPATIENT
Start: 2024-10-29 | End: 2024-10-29

## 2024-10-29 RX ORDER — FERROUS SULFATE 324(65)MG
324 TABLET, DELAYED RELEASE (ENTERIC COATED) ORAL
Qty: 30 TABLET | Refills: 2 | Status: SHIPPED | OUTPATIENT
Start: 2024-10-29

## 2024-10-29 RX ORDER — CHLORHEXIDINE GLUCONATE ORAL RINSE 1.2 MG/ML
15 SOLUTION DENTAL ONCE
OUTPATIENT
Start: 2024-10-29 | End: 2024-10-29

## 2024-10-29 RX ORDER — MULTIVIT-MIN/IRON FUM/FOLIC AC 7.5 MG-4
1 TABLET ORAL DAILY
Qty: 30 TABLET | Refills: 2 | Status: SHIPPED | OUTPATIENT
Start: 2024-10-29

## 2024-10-29 RX ORDER — TRANEXAMIC ACID 10 MG/ML
1000 INJECTION, SOLUTION INTRAVENOUS ONCE
OUTPATIENT
Start: 2024-10-29 | End: 2024-10-29

## 2024-10-29 RX ORDER — MUPIROCIN 20 MG/G
OINTMENT TOPICAL 2 TIMES DAILY
Qty: 15 G | Refills: 0 | Status: SHIPPED | OUTPATIENT
Start: 2024-10-29

## 2024-10-29 RX ORDER — ENOXAPARIN SODIUM 100 MG/ML
40 INJECTION SUBCUTANEOUS DAILY
Qty: 11.2 ML | Refills: 0 | Status: SHIPPED | OUTPATIENT
Start: 2024-10-29 | End: 2024-11-26

## 2024-10-29 RX ORDER — SODIUM CHLORIDE, SODIUM LACTATE, POTASSIUM CHLORIDE, CALCIUM CHLORIDE 600; 310; 30; 20 MG/100ML; MG/100ML; MG/100ML; MG/100ML
125 INJECTION, SOLUTION INTRAVENOUS CONTINUOUS
OUTPATIENT
Start: 2024-10-29

## 2024-10-29 RX ORDER — FOLIC ACID 1 MG/1
1 TABLET ORAL DAILY
Qty: 30 TABLET | Refills: 2 | Status: SHIPPED | OUTPATIENT
Start: 2024-10-29

## 2024-10-29 RX ORDER — ASCORBIC ACID 500 MG
500 TABLET ORAL 2 TIMES DAILY
Qty: 60 TABLET | Refills: 2 | Status: SHIPPED | OUTPATIENT
Start: 2024-10-29

## 2024-10-29 RX ORDER — GABAPENTIN 300 MG/1
300 CAPSULE ORAL ONCE
OUTPATIENT
Start: 2024-10-29 | End: 2024-10-29

## 2024-10-29 RX ORDER — ACETAMINOPHEN 325 MG/1
975 TABLET ORAL ONCE
OUTPATIENT
Start: 2024-10-29 | End: 2024-10-29

## 2024-10-29 NOTE — PROGRESS NOTES
Assessment:   Diagnosis ICD-10-CM Associated Orders   1. Primary osteoarthritis of left knee  M17.12 Case request operating room: ARTHROPLASTY KNEE TOTAL W ROBOT     Notify physician     Diet NPO; Sips with meds     Nursing Communication Warmimg Interventions Implemented     Nursing Communication CHG bath, have staff wash entire body (neck down) per pre-op bathing protocol. Routine, evening prior to, and day of surgery.     Nursing Communication Swab both nares with Povidone-Iodine solution, EXCLUDE if patient has shellfish/Iodine allergy, and replace with nasal alcohol swabstick. Routine, day of surgery, on call to OR     chlorhexidine (PERIDEX) 0.12 % oral rinse 15 mL     Void on call to OR     Insert peripheral IV     lactated ringers infusion     acetaminophen (TYLENOL) tablet 975 mg     gabapentin (NEURONTIN) capsule 300 mg     tranexamic acid (CYKLOKAPRON) 1000-0.7 MG/100ML-% injection 1,000 mg     ceFAZolin (ANCEF) IVPB (premix in dextrose) 2,000 mg 50 mL     ascorbic acid (VITAMIN C) 500 MG tablet     ferrous sulfate 324 (65 Fe) mg     folic acid (FOLVITE) 1 mg tablet     Comprehensive metabolic panel     Hemoglobin A1C W/EAG Estimation     CBC and differential     Protime-INR     APTT     Type and screen     Ambulatory referral to Family Practice     Ambulatory referral to Physical Therapy     Place sequential compression device     Multiple Vitamins-Minerals (multivitamin with minerals) tablet     mupirocin (BACTROBAN) 2 % ointment     MRSA culture     enoxaparin (LOVENOX) 40 mg/0.4 mL     Case request operating room: ARTHROPLASTY KNEE TOTAL W ROBOT      2. Left knee pain, unspecified chronicity  M25.562           Plan:  59 y.o. male with known osteoarthritis of bilateral knees, left worse than right  S/p CSI to left knee 10/1/2024 with little to no pain relief   Patient educated on various next steps of treatment including repeat CSI, VISCO supplementation, and continuing with PT/bracing   Patient wishes  to pursue elective left total knee arthroplasty in early 2025.   Meet with surgical coordinator today   Pre-op vitamins and post-op Lovenox sent to pharmacy today  Follow up post-op       The above stated was discussed in layman's terms and the patient expressed understanding.  All questions were answered to the patient's satisfaction.     To do next visit:  Follow up post-op       Subjective:   Sg Gomez is a 59 y.o. male who presents for follow up of bilateral knee pain.  Patient has known osteoarthritis of bilateral knees, left worse than right.  About 4 weeks ago, patient received injection of corticosteroid to the left knee which provided little to no symptomatic relief.  He presents today for follow-up discussion regarding possible treatments for the left knee.  He describes his left knee pain as severe and unrelenting, given his x-rays, history, exam total knee arthroplasty was recommended at this time.  Patient was educated on procedure and recovery.  Discussed with patient the risks and benefits of operative intervention. Risk of the surgery are inclusive of but not limited to bleeding, infection, nerve injury, blood clot, worsening of symptoms, not achieving the anticipated results, persistent stiffness, weakness and the need for additional surgery. The patient verbally stated they understood those risks and would like to proceed with the surgery. Consent signed in office today.          Patient is self employed, owns construction business.     Review of systems negative unless otherwise specified in HPI    Past Medical History:   Diagnosis Date    High cholesterol     Obstructive sleep apnea     Last Assessed:6/8/16       Past Surgical History:   Procedure Laterality Date    COLONOSCOPY         Family History   Problem Relation Age of Onset    Hypertension Mother     Lung cancer Father        Social History     Occupational History    Not on file   Tobacco Use    Smoking status: Never    Smokeless  tobacco: Never   Vaping Use    Vaping status: Never Used   Substance and Sexual Activity    Alcohol use: No    Drug use: No    Sexual activity: Not Currently         Current Outpatient Medications:     ascorbic acid (VITAMIN C) 500 MG tablet, Take 1 tablet (500 mg total) by mouth 2 (two) times a day, Disp: 60 tablet, Rfl: 2    enoxaparin (LOVENOX) 40 mg/0.4 mL, Inject 0.4 mL (40 mg total) under the skin daily for 28 days To start Post-Op, Disp: 11.2 mL, Rfl: 0    ferrous sulfate 324 (65 Fe) mg, Take 1 tablet (324 mg total) by mouth daily before breakfast, Disp: 30 tablet, Rfl: 2    folic acid (FOLVITE) 1 mg tablet, Take 1 tablet (1 mg total) by mouth daily, Disp: 30 tablet, Rfl: 2    Multiple Vitamins-Minerals (multivitamin with minerals) tablet, Take 1 tablet by mouth daily, Disp: 30 tablet, Rfl: 2    mupirocin (BACTROBAN) 2 % ointment, Apply topically 2 (two) times a day Apply twice a day to each nostril for 5 days including the day of surgery, Disp: 15 g, Rfl: 0    atorvastatin (LIPITOR) 20 mg tablet, Take 1 tablet (20 mg total) by mouth daily, Disp: 90 tablet, Rfl: 1    omeprazole (PriLOSEC) 20 mg delayed release capsule, TAKE 1 CAPSULE BY MOUTH EVERY DAY, Disp: 90 capsule, Rfl: 1    Allergies   Allergen Reactions    Seasonal Ic  [Cholestatin]             Vitals:    10/29/24 0839   BP: 120/75   Pulse: 71       Objective:  Physical exam  General: Awake, Alert, Oriented  Eyes: Pupils equal, round and reactive to light  Heart: regular rate and rhythm  Lungs: No audible wheezing  Abdomen: soft                    Left Knee Exam     Tenderness   The patient is experiencing tenderness in the lateral joint line and medial joint line.    Range of Motion   Extension:  5   Flexion:  110 (With crepitation and stiffness)     Other   Erythema: absent  Scars: absent  Swelling: mild  Effusion: no effusion present            Diagnostics, reviewed and taken today if performed as documented:    Left knee x-ray:  - Significant  "tricompartmental osteoarthritis with degenerative changes   - Varus alignment with near bone-on-bone articulation medially  - Bone-on-bone articulation of patellofemoral compartment   - Subchondral sclerosis and multiple osteophytes noted         Procedures, if performed today:    None performed      Portions of the record may have been created with voice recognition software.  Occasional wrong word or \"sound a like\" substitutions may have occurred due to the inherent limitations of voice recognition software.  Read the chart carefully and recognize, using context, where substitutions have occurred.      "

## 2024-11-04 DIAGNOSIS — K21.9 GASTROESOPHAGEAL REFLUX DISEASE: ICD-10-CM

## 2024-11-04 NOTE — TELEPHONE ENCOUNTER
Reason for call:   [x] Refill   [] Prior Auth  [] Other:     Office:   [x] PCP/Provider - Kofi Colon [facility]  [] Specialty/Provider -     Medication: Omeprazole    Dose/Frequency: 20mg - daily    Quantity: 90    Pharmacy: [facility] [place]     Does the patient have enough for 3 days?   [] Yes   [x] No - Send as HP to POD

## 2024-11-25 ENCOUNTER — OFFICE VISIT (OUTPATIENT)
Dept: SLEEP CENTER | Facility: CLINIC | Age: 59
End: 2024-11-25
Payer: COMMERCIAL

## 2024-11-25 VITALS
TEMPERATURE: 98.5 F | HEART RATE: 72 BPM | WEIGHT: 260 LBS | HEIGHT: 73 IN | DIASTOLIC BLOOD PRESSURE: 82 MMHG | SYSTOLIC BLOOD PRESSURE: 140 MMHG | OXYGEN SATURATION: 97 % | BODY MASS INDEX: 34.46 KG/M2 | RESPIRATION RATE: 18 BRPM

## 2024-11-25 DIAGNOSIS — G47.33 OBSTRUCTIVE SLEEP APNEA TREATED WITH CONTINUOUS POSITIVE AIRWAY PRESSURE (CPAP): Primary | ICD-10-CM

## 2024-11-25 PROCEDURE — 99214 OFFICE O/P EST MOD 30 MIN: CPT | Performed by: NURSE PRACTITIONER

## 2024-11-25 NOTE — PROGRESS NOTES
Progress Note - [facility]   Sg Gomez 59 y.o. male   :1965, MRN: [id number]  2024        Follow Up Evaluation / Problem:  Severe Obstructive Sleep Apnea  Obesity      Thank you for the opportunity of participating in the evaluation and care of this patient in the Sleep Clinic at [facility]      HPI: Sg Gomez is a 59 y.o. year old male.  The patient presents for follow up of severe obstructive sleep apnea.  He completed a diagnostic sleep study in 2016, which indicated severe SAADIA with an AHI of 57.4, worsening to 81.8 in supine sleep, with an oxygen lazara of 61% and sat less than 90% for 107.9 minutes. He was set up with CPAP equipment on 7/15/2016.  His comorbid conditions include obesity, HTN, GERD.  He is here today to review annual compliance and effectiveness of treatment.      Current Outpatient Medications:     atorvastatin (LIPITOR) 20 mg tablet, Take 1 tablet (20 mg total) by mouth daily, Disp: 90 tablet, Rfl: 1    omeprazole (PriLOSEC) 20 mg delayed release capsule, Take 1 capsule (20 mg total) by mouth daily, Disp: 90 capsule, Rfl: 1    ascorbic acid (VITAMIN C) 500 MG tablet, Take 1 tablet (500 mg total) by mouth 2 (two) times a day (Patient not taking: Reported on 2024), Disp: 60 tablet, Rfl: 2    enoxaparin (LOVENOX) 40 mg/0.4 mL, Inject 0.4 mL (40 mg total) under the skin daily for 28 days To start Post-Op (Patient not taking: Reported on 2024), Disp: 11.2 mL, Rfl: 0    ferrous sulfate 324 (65 Fe) mg, Take 1 tablet (324 mg total) by mouth daily before breakfast (Patient not taking: Reported on 2024), Disp: 30 tablet, Rfl: 2    folic acid (FOLVITE) 1 mg tablet, Take 1 tablet (1 mg total) by mouth daily (Patient not taking: Reported on 2024), Disp: 30 tablet, Rfl: 2    Multiple Vitamins-Minerals (multivitamin with minerals) tablet, Take 1 tablet by mouth daily (Patient not taking: Reported on  "11/25/2024), Disp: 30 tablet, Rfl: 2    mupirocin (BACTROBAN) 2 % ointment, Apply topically 2 (two) times a day Apply twice a day to each nostril for 5 days including the day of surgery (Patient not taking: Reported on 11/25/2024), Disp: 15 g, Rfl: 0    How likely are you to doze off or fall asleep in the following situations, in contrast to feeling just tired?  Sitting and reading: Slight chance of dozing  Watching TV: Slight chance of dozing  Sitting, inactive in a public place (e.g. a theatre or a meeting): Slight chance of dozing  As a passenger in a car for an hour without a break: Slight chance of dozing  Lying down to rest in the afternoon when circumstances permit: High chance of dozing  Sitting and talking to someone: Would never doze  Sitting quietly after a lunch without alcohol: Slight chance of dozing  In a car, while stopped for a few minutes in traffic: Would never doze  Total score: 8              Vitals:    11/25/24 0832   BP: 140/82   BP Location: Left arm   Patient Position: Sitting   Cuff Size: Large   Pulse: 72   Resp: 18   Temp: 98.5 °F (36.9 °C)   TempSrc: Temporal   SpO2: 97%   Weight: 118 kg (260 lb)   Height: 6' 1\" (1.854 m)       Body mass index is 34.3 kg/m².            EPWORTH SLEEPINESS SCORE  Total score: 8      Past History Since Last Sleep Center Visit:   He denies any changes to his health since his last visit.  He plans to have a total knee replacement in January.  He continues to use CPAP every night, using a nasal pillow mask.  He feels the settings and mask are comfortable.   He feels sleep has improved significantly with use of CPAP equipment.  He has made adjustments to the humidity settings, which improved oral dryness.      The review of systems and following portions of the patient's history were reviewed and updated as appropriate: allergies, current medications, past family history, past medical history, past social history, past surgical history, and problem " list.        OBJECTIVE  Equipment set up date:  7/15/16 - received the DreamStation 2 replacement  PAP Pressure: APAP 12-14cm  Type of mask used: nasal pillow  DME Provider: [employer]    Physical Exam:     General Appearance:   Alert, cooperative, no distress, appears stated age, obese     Lungs:    Heart:  Clear to auscultation bilaterally, respirations unlabored      Regular rate and rhythm, S1 and S2 normal, no murmur, rub or gallop              ASSESSMENT / PLAN    1. Obstructive sleep apnea treated with continuous positive airway pressure (CPAP)  PAP DME Resupply/Reorder                Counseling / Coordination of Care  I have spent a total time of 30 minutes in caring for this patient on the day of the visit/encounter including Risks and benefits of tx options, Instructions for management, Patient and family education, Importance of tx compliance, Risk factor reductions, Impressions, Counseling / Coordination of care, Documenting in the medical record, and Reviewing / ordering tests, medicine, procedures  .      Today I reviewed the patient's compliance data.  he has been able to use the equipment 100% of all days recorded.  Average usage was 4 or more hours 96.7% of all days recorded.  Average pressure setting is 13.2.  The estimated AHI is 5.0 abnormal breathing events per hour, including 1.4 OA, 1.4 CA, 2.2 hypopneas.  He is at goal for hours of use and effectiveness of treatment. The patient feels they benefit from the use of PAP equipment and would like to continue PAP therapy.  Response to treatment has been very good.  A prescription has been provided to last for the next year.      He will continue using this equipment at the settings noted above for the next 12 months.  At that timehe will then return for a routine follow-up evaluation. I have asked the patient to contact the Sleep Disorders Center if he encounters any difficulties prior to that time.    The following instructions have been given  to the patient today:    Patient Instructions   1.  Continue use of CPAP equipment nightly  2.  Continue to clean your equipment, as discussed  3.  Contact the Sleep Disorders Center with any questions or concerns prior to your next visit, as needed  4.  Schedule visit for follow-up in 1 year   5.  The mask we discussed is called the P30-i      Nursing Support:  When:  Monday through Friday 7:00am-5:00pm, except holidays  Where:  Direct phone line is [phone], option 3  If you are having a true emergency, please call 911.  In the event that the line is busy or it is after hours, please leave a voice mail message and we will return your call.  Please speak clearly, leaving your full name, birth date, best number to reach you and the reason for your call.  Medication refills:  We will need the name of the medication, the dosage, the ordering provider, whether you get a 30 day or 90 day refill and the pharmacy name and address.  Medications will be ordered by the providers only.  Nurses cannot call in prescriptions.    To reach the Sleep Disorders Center office staff:  Call [phone], option 1, followed by option 3    ROLANDO Hernandez  [facility]

## 2024-11-25 NOTE — PATIENT INSTRUCTIONS
1.  Continue use of CPAP equipment nightly  2.  Continue to clean your equipment, as discussed  3.  Contact the Sleep Disorders Center with any questions or concerns prior to your next visit, as needed  4.  Schedule visit for follow-up in 1 year   5.  The mask we discussed is called the [facility]-i      Nursing Support:  When:  Monday through Friday 7:00am-5:00pm, except holidays  Where:  Direct phone line is [phone], option 3  If you are having a true emergency, please call 911.  In the event that the line is busy or it is after hours, please leave a voice mail message and we will return your call.  Please speak clearly, leaving your full name, birth date, best number to reach you and the reason for your call.  Medication refills:  We will need the name of the medication, the dosage, the ordering provider, whether you get a 30 day or 90 day refill and the pharmacy name and address.  Medications will be ordered by the providers only.  Nurses cannot call in prescriptions.    To reach the [facility] office staff:  Call [phone], option 1, followed by option 3

## 2024-11-26 DIAGNOSIS — M17.12 PRIMARY OSTEOARTHRITIS OF LEFT KNEE: ICD-10-CM

## 2024-11-26 RX ORDER — FOLIC ACID 1 MG/1
1000 TABLET ORAL DAILY
Qty: 90 TABLET | Refills: 1 | Status: SHIPPED | OUTPATIENT
Start: 2024-11-26

## 2024-12-12 ENCOUNTER — TELEPHONE (OUTPATIENT)
Dept: OBGYN CLINIC | Facility: HOSPITAL | Age: 59
End: 2024-12-12

## 2024-12-12 NOTE — TELEPHONE ENCOUNTER
Preoperative Elective Admission Assessment: spoke to patient.     EKG/LAB/MRSA SWAB/CXR date: 12/26-12/28    Living Situation:    Who does pt live with: spouse  What kind of home: ranch  How do they enter the home: front  How many levels in home: 1 level home   # of steps to enter home: 2 to enter   # of steps to second floor: N/A   Sleeping arrangement: first/entry floor  Where is Bathroom: Step in tub      First Floor Setup:   Is there a bathroom: Yes  Where would pt sleep: bed     DME:  Denies DME. Ordering RW  and Raised Toilet   PROVIDED [employer] NUMBER TO CONTACT: [phone]   We discussed clearing pathways in the home and making sure there is accessibly to use the walker, for example, removing throw rugs.      Patient's Current Level of Function: Ambulates: Independently and ADLs: Independent    Post-op Caregiver: spouse  Currently receive any HHC/aides/community supports: No     Post-op Transport: spouse  To/from hospital: spouse  To/from PT 2-3x/week: spouse  Uses community transport now: No     Outpatient Physical Therapy Site:  Site: [place]    pre and post-op appts scheduled? Yes     Medication Management: self  Preferred Pharmacy for Post-op Medications: [id number] - [address] [[id number]]   Blood Management Vitamin Regimen:  Reviewed, only got Folic Acid- instructed to start all 30 days before.   Post-op anticoagulant: prescribed preoperatively - patient has at home ready for after surgery use only  Has Bactroban for 5 days preop: Patient following up with pharmacy, and will call me back with update.   Educated on Preoperative Bathing Instructions, and use of Soap for 5 days before surgery.      DC Plan: Pt plans to be discharged home    Barriers to DC identified preoperatively: none identified    BMI: 34.30    Patient Education:  Pt educated on post-op pain, early mobilization (POD0), LOS goals, OP PT goals, and preoperative bathing. Patient educated that our  goal is to appropriately discharge patient based off their post-op function while striving to maintain maximal independence. The goal is to discharge patient to home and for them to attend outpatient physical therapy.    Assigned to care team? Yes

## 2024-12-13 LAB
DME PARACHUTE DELIVERY DATE ACTUAL: NORMAL
DME PARACHUTE DELIVERY DATE EXPECTED: NORMAL
DME PARACHUTE DELIVERY DATE REQUESTED: NORMAL
DME PARACHUTE ITEM DESCRIPTION: NORMAL
DME PARACHUTE ITEM DESCRIPTION: NORMAL
DME PARACHUTE ORDER STATUS: NORMAL
DME PARACHUTE SUPPLIER NAME: NORMAL
DME PARACHUTE SUPPLIER PHONE: NORMAL

## 2024-12-27 ENCOUNTER — LAB REQUISITION (OUTPATIENT)
Dept: LAB | Facility: HOSPITAL | Age: 59
End: 2024-12-27
Payer: COMMERCIAL

## 2024-12-27 ENCOUNTER — APPOINTMENT (OUTPATIENT)
Dept: LAB | Facility: IMAGING CENTER | Age: 59
End: 2024-12-27

## 2024-12-27 DIAGNOSIS — M17.12 OSTEOARTHRITIS OF LEFT KNEE, UNSPECIFIED OSTEOARTHRITIS TYPE: ICD-10-CM

## 2024-12-27 DIAGNOSIS — M17.12 UNILATERAL PRIMARY OSTEOARTHRITIS, LEFT KNEE: ICD-10-CM

## 2024-12-27 DIAGNOSIS — M17.12 PRIMARY OSTEOARTHRITIS OF LEFT KNEE: ICD-10-CM

## 2024-12-27 LAB
ABO GROUP BLD: NORMAL
ALBUMIN SERPL BCG-MCNC: 4.4 G/DL (ref 3.5–5)
ALP SERPL-CCNC: 68 U/L (ref 34–104)
ALT SERPL W P-5'-P-CCNC: 23 U/L (ref 7–52)
ANION GAP SERPL CALCULATED.3IONS-SCNC: 9 MMOL/L (ref 4–13)
APTT PPP: 27 SECONDS (ref 23–34)
AST SERPL W P-5'-P-CCNC: 17 U/L (ref 13–39)
BASOPHILS # BLD AUTO: 0.06 THOUSANDS/ÂΜL (ref 0–0.1)
BASOPHILS NFR BLD AUTO: 1 % (ref 0–1)
BILIRUB SERPL-MCNC: 0.39 MG/DL (ref 0.2–1)
BLD GP AB SCN SERPL QL: NEGATIVE
BUN SERPL-MCNC: 15 MG/DL (ref 5–25)
CALCIUM SERPL-MCNC: 9.7 MG/DL (ref 8.4–10.2)
CHLORIDE SERPL-SCNC: 107 MMOL/L (ref 96–108)
CO2 SERPL-SCNC: 28 MMOL/L (ref 21–32)
CREAT SERPL-MCNC: 0.82 MG/DL (ref 0.6–1.3)
EOSINOPHIL # BLD AUTO: 0.09 THOUSAND/ÂΜL (ref 0–0.61)
EOSINOPHIL NFR BLD AUTO: 2 % (ref 0–6)
ERYTHROCYTE [DISTWIDTH] IN BLOOD BY AUTOMATED COUNT: 12.1 % (ref 11.6–15.1)
EST. AVERAGE GLUCOSE BLD GHB EST-MCNC: 114 MG/DL
GFR SERPL CREATININE-BSD FRML MDRD: 96 ML/MIN/1.73SQ M
GLUCOSE P FAST SERPL-MCNC: 108 MG/DL (ref 65–99)
HBA1C MFR BLD: 5.6 %
HCT VFR BLD AUTO: 45.8 % (ref 36.5–49.3)
HGB BLD-MCNC: 14.6 G/DL (ref 12–17)
IMM GRANULOCYTES # BLD AUTO: 0.02 THOUSAND/UL (ref 0–0.2)
IMM GRANULOCYTES NFR BLD AUTO: 0 % (ref 0–2)
INR PPP: 0.93 (ref 0.85–1.19)
LYMPHOCYTES # BLD AUTO: 1.71 THOUSANDS/ÂΜL (ref 0.6–4.47)
LYMPHOCYTES NFR BLD AUTO: 34 % (ref 14–44)
MCH RBC QN AUTO: 30.4 PG (ref 26.8–34.3)
MCHC RBC AUTO-ENTMCNC: 31.9 G/DL (ref 31.4–37.4)
MCV RBC AUTO: 95 FL (ref 82–98)
MONOCYTES # BLD AUTO: 0.45 THOUSAND/ÂΜL (ref 0.17–1.22)
MONOCYTES NFR BLD AUTO: 9 % (ref 4–12)
NEUTROPHILS # BLD AUTO: 2.78 THOUSANDS/ÂΜL (ref 1.85–7.62)
NEUTS SEG NFR BLD AUTO: 54 % (ref 43–75)
NRBC BLD AUTO-RTO: 0 /100 WBCS
PLATELET # BLD AUTO: 221 THOUSANDS/UL (ref 149–390)
PMV BLD AUTO: 9.9 FL (ref 8.9–12.7)
POTASSIUM SERPL-SCNC: 4.6 MMOL/L (ref 3.5–5.3)
PROT SERPL-MCNC: 7.5 G/DL (ref 6.4–8.4)
PROTHROMBIN TIME: 12.7 SECONDS (ref 12.3–15)
RBC # BLD AUTO: 4.8 MILLION/UL (ref 3.88–5.62)
RH BLD: POSITIVE
SODIUM SERPL-SCNC: 144 MMOL/L (ref 135–147)
SPECIMEN EXPIRATION DATE: NORMAL
WBC # BLD AUTO: 5.11 THOUSAND/UL (ref 4.31–10.16)

## 2024-12-27 PROCEDURE — 85730 THROMBOPLASTIN TIME PARTIAL: CPT

## 2024-12-27 PROCEDURE — 85025 COMPLETE CBC W/AUTO DIFF WBC: CPT

## 2024-12-27 PROCEDURE — 83036 HEMOGLOBIN GLYCOSYLATED A1C: CPT

## 2024-12-27 PROCEDURE — 86900 BLOOD TYPING SEROLOGIC ABO: CPT | Performed by: ORTHOPAEDIC SURGERY

## 2024-12-27 PROCEDURE — 36415 COLL VENOUS BLD VENIPUNCTURE: CPT

## 2024-12-27 PROCEDURE — 85610 PROTHROMBIN TIME: CPT

## 2024-12-27 PROCEDURE — 86850 RBC ANTIBODY SCREEN: CPT | Performed by: ORTHOPAEDIC SURGERY

## 2024-12-27 PROCEDURE — 86901 BLOOD TYPING SEROLOGIC RH(D): CPT | Performed by: ORTHOPAEDIC SURGERY

## 2024-12-27 PROCEDURE — 80053 COMPREHEN METABOLIC PANEL: CPT

## 2024-12-27 PROCEDURE — 87081 CULTURE SCREEN ONLY: CPT

## 2024-12-28 LAB — MRSA NOSE QL CULT: NORMAL

## 2024-12-30 PROBLEM — R73.03 PREDIABETES: Status: RESOLVED | Noted: 2022-02-08 | Resolved: 2024-12-30

## 2024-12-30 NOTE — PROGRESS NOTES
Internal Medicine Pre-Operative Evaluation:     Reason for Visit: Pre-operative Evaluation for Risk Stratification and Optimization    Patient ID: Sg Gomez is a 59 y.o. male.     Case: [id number] Date/Time: 01/20/25 1045   Procedure: ARTHROPLASTY KNEE TOTAL W ROBOT (Left: Knee)   Anesthesia type: Choice   Diagnosis: Primary osteoarthritis of left knee [M17.12]   Pre-op diagnosis: Primary osteoarthritis of left knee [M17.12]   Location: [facility] OR ROOM 04 / [facility]   Surgeons: Sherman Pierce MD         Recommendations to Proceed withSurgery    Patient is considered to be Low risk for Medium risk procedure.     After evaluation and discussion with patient with emphasis that all surgery has some degree of inherent risk it is acknowledged by patient this risk is Acceptable.    Patient is optimized and may proceed with planned procedure.     Assessment    Pre-operative Medical Evaluation for planned surgery  Recommendations as listed in PLAN section below  Contact surgical nurse  navigator with any questions regarding preoperative plan or schedule.      Assessment & Plan  Chronic pain of left knee  Failed outpatient conservative measures  Elected to undergo total joint arthroplasty    Obesity (BMI 35.0-39.9 without comorbidity)  Continue wt loss goals  Gastroesophageal reflux disease, unspecified whether esophagitis present  Continue PPI    Preop exam for internal medicine    Primary osteoarthritis of left knee             Plan:     1. Further preoperative workup as follows:   - none no further testing required may proceed with surgery    2. Preoperative Medication Management Review performed by PAT nursing  YES    3. Patient requires further consultation with:   No Consults Required    4. Discharge Planning / Barriers to Discharge  none identified - patients has post discharge therapy plan in place, transportation arranged for discharge day, adequate family support at  home to assist with discharge to home.        Subjective:           History of Present Illness:     Sg Gomez is a 59 y.o. male who presents to the office today for a preoperative consultation at the request of surgeon. The patient understands this is an elective procedure and not emergent. They are electing to undergo planned procedure with an understanding that all surgery has inherent risk. They have worked with their surgeon and failed conservative treatment measures. Today they present for preoperative risk assessment and recommendations for optimization in preparation for surgery.    Pt seen in surgical optimization center for upcoming proposed surgery. They have failed previous conservative measures and have elected surgical intervention.     Pt meets presurgical lab and BMI optimization goals.      Sg Gomez has an IN HOSPITAL cardiac risk of RCI RISK CLASS I (0 risk factors, risk of major cardiac compl. appr. 0.5%) based on RCRI calculator    Cardiac Risk Estimation: per the Revised Cardiac Risk Index (Circ. 100:1043, 1999),         Pre-op Exam    Previous history of bleeding disorders or clots?: No  Previous Anesthesia reaction?: No  Prolonged steroid use in the last 6 months?: No    Assessment of Cardiac Risk:   - Unstable or severe angina or MI in the last 6 weeks or history of stent placement in the last year?: No   - Decompensated heart failure (e.g. New onset heart failure, NYHA  Class IV heart failure, or worsening existing heart failure)?: No  - Significant arrhythmias such as high grade AV block, symptomatic ventricular arrhythmia, newly recognized ventricular tachycardia, supraventricular tachycardia with resting heart rate >100, or symptomatic bradycardia?: No  - Severe heart valve disease including aortic stenosis or symptomatic mitral stenosis?: No      Pre-operative Risk Factors:  Elevated-risk surgery: No    History of cerebrovascular disease: No    History of ischemic heart  "disease: No  Pre-operative treatment with insulin: No  Pre-operative creatinine >2 mg/dL: No    History of congestive heart failure: No        ROS: No TIA's or unusual headaches, no dysphagia.  No prolonged cough. No dyspnea or chest pain on exertion.  No abdominal pain, change in bowel habits, black or bloody stools.  No urinary tract or BPH symptoms.  Positive reported pain in arthritic joint. Positive difficulty with gait. No skin rashes or issues.      Objective:    /81   Pulse 74   Ht 6' 1\" (1.854 m)   Wt 122 kg (268 lb)   BMI 35.36 kg/m²       General Appearance: no distress, conversive  HEENT: PERRLA, conjuctiva normal; oropharynx clear; mucous membranes moist;   Neck:  Supple, no lymphadenopathy or thyromegaly  Lungs: breath sounds normal, normal respiratory effort, no retractions, expiratory effort normal  CV: normal heart sounds S1/S2, PMI normal   ABD: soft non tender, no masses , no hepatic or splenomegaly  EXT: DP pulses intact, no lymphadenopathy, no edema  Skin: normal turgor, normal texture, no rash  Psych: affect normal, mood normal  Neuro: AAOx3        The following portions of the patient's history were reviewed and updated as appropriate: allergies, current medications, past family history, past medical history, past social history, past surgical history and problem list.     Past History:       Past Medical History:   Diagnosis Date   • High cholesterol    • Obstructive sleep apnea     Last Assessed:6/8/16    Past Surgical History:   Procedure Laterality Date   • COLONOSCOPY            Social History     Tobacco Use   • Smoking status: Never   • Smokeless tobacco: Never   Vaping Use   • Vaping status: Never Used   Substance Use Topics   • Alcohol use: No   • Drug use: No     Family History   Problem Relation Age of Onset   • Hypertension Mother    • Lung cancer Father           Allergies:     Allergies   Allergen Reactions   • Seasonal Ic  [Cholestatin]         Current Medications: " "    Current Outpatient Medications   Medication Instructions   • ascorbic acid (VITAMIN C) 500 mg, Oral, 2 times daily   • atorvastatin (LIPITOR) 20 mg, Oral, Daily   • enoxaparin (LOVENOX) 40 mg, Subcutaneous, Daily, To start Post-Op   • ferrous sulfate 324 mg, Oral, Daily before breakfast   • folic acid (FOLVITE) 1,000 mcg, Oral, Daily   • Multiple Vitamins-Minerals (multivitamin with minerals) tablet 1 tablet, Oral, Daily   • mupirocin (BACTROBAN) 2 % ointment Topical, 2 times daily, Apply twice a day to each nostril for 5 days including the day of surgery   • omeprazole (PRILOSEC) 20 mg, Oral, Daily           PRE-OP WORKSHEET DATA    Assessment of Pre-Operative Risks     MLJ Quality Hard Stops:    BMI (<40) : Estimated body mass index is 35.36 kg/m² as calculated from the following:    Height as of this encounter: 6' 1\" (1.854 m).    Weight as of this encounter: 122 kg (268 lb).    Hgb ( >11):   Lab Results   Component Value Date    HGB 14.6 12/27/2024    HGB 14.7 08/20/2024    HGB 14.7 07/31/2023       HbA1c (<7.5) :   Lab Results   Component Value Date    HGBA1C 5.6 12/27/2024       GFR (>60) (Less then 45 = Nephrology consult):    Lab Results   Component Value Date    EGFR 96 12/27/2024    EGFR 98 08/20/2024    EGFR 94 07/31/2023            Pre-Op Data Reviewed:       Laboratory Results: I have personally reviewed the pertinent laboratory results/reports     EKG:I have personally reviewed pertinent reports.  . None      OLD RECORDS: reviewed old records in the chart review section if EHR on day of visit.    Previous cardiopulmonary studies within the past year:  Echocardiogram: no   Cardiac Catheterization: no  Stress Test: no      Time of visit including pre-visit chart review, visit and post-visit coordination of plan and care , review of pre-surgical lab work, preparation and time spent documenting note in electronic medical record, time spent face-to-face in physical examination answering patient " questions by care team 35 minutes             [facility]

## 2024-12-30 NOTE — PATIENT INSTRUCTIONS
BEFORE SURGERY    Contact your surgical nurse navigator or surgical provider with any questions regarding preoperative plan or schedule.  Stop all over the counter supplements, herbal, naturopathic  medications for 1 week prior to surgery UNLESS prescribed by your surgeon  Hold NSAIDS (i.e. advil, alleve, motrin, ibuprofen, celebrex) minimum 5 days prior to surgery  Follow presurgical medication instructions provided by preadmission nursing team reviewed during your presurgery phone call  Strategies for optimizing your surgery through breathing exercises, nutrition and physical activity can be found at [email or web address]  Call [phone] with any presurgical concerns or medications questions or use the messaging feature in your [employer] nayeli to contact your provider    AFTER SURGERY    Recommend using Tylenol ( acetaminophen ) 1000 mg every eight hours during the first week post discharge along with icing the area for 20 mins every 3-4 hours while awake can be helpful in reducing your need for post operative opioid use. This opioid sparing plan can be used along side your surgeons pain plan.  Use stool softener over the counter (colace) daily after surgery during the first 1-2 weeks to avoid post operative constipation issues  If no bowel movement within 3 days after surgery then use over the counter Miralax in addition to your stool softener   If cleared by your surgical team for activity then early and often walking is encouraged and can be important in prevention of post surgical blood clots. Additionally spend as much time out of bed as possible and allowed by your surgical team  Use your incentive spirometer twice per hour in the first seven days after surgery to help prevent post surgery lung complications and infections  It is very important you follow the instructions from your surgeon regarding any medications for after surgery blood clot prevention. Compliance with these medications or interventions is very  important.  Call [phone] with any post discharge concerns or medical issues or use the messaging feature in your [employer] nayeli to contact your provider

## 2025-01-06 ENCOUNTER — ANESTHESIA EVENT (OUTPATIENT)
Age: 60
End: 2025-01-06
Payer: COMMERCIAL

## 2025-01-07 ENCOUNTER — RA CDI HCC (OUTPATIENT)
Dept: OTHER | Facility: HOSPITAL | Age: 60
End: 2025-01-07

## 2025-01-08 ENCOUNTER — OFFICE VISIT (OUTPATIENT)
Age: 60
End: 2025-01-08
Payer: COMMERCIAL

## 2025-01-08 VITALS
SYSTOLIC BLOOD PRESSURE: 148 MMHG | HEIGHT: 73 IN | WEIGHT: 268 LBS | HEART RATE: 74 BPM | DIASTOLIC BLOOD PRESSURE: 81 MMHG | BODY MASS INDEX: 35.52 KG/M2

## 2025-01-08 DIAGNOSIS — G89.29 CHRONIC PAIN OF LEFT KNEE: Primary | ICD-10-CM

## 2025-01-08 DIAGNOSIS — Z01.818 PREOP EXAM FOR INTERNAL MEDICINE: ICD-10-CM

## 2025-01-08 DIAGNOSIS — E66.9 OBESITY (BMI 35.0-39.9 WITHOUT COMORBIDITY): ICD-10-CM

## 2025-01-08 DIAGNOSIS — M25.562 CHRONIC PAIN OF LEFT KNEE: Primary | ICD-10-CM

## 2025-01-08 DIAGNOSIS — M17.12 PRIMARY OSTEOARTHRITIS OF LEFT KNEE: ICD-10-CM

## 2025-01-08 DIAGNOSIS — K21.9 GASTROESOPHAGEAL REFLUX DISEASE, UNSPECIFIED WHETHER ESOPHAGITIS PRESENT: ICD-10-CM

## 2025-01-08 PROCEDURE — 99215 OFFICE O/P EST HI 40 MIN: CPT | Performed by: INTERNAL MEDICINE

## 2025-01-09 NOTE — PRE-PROCEDURE INSTRUCTIONS
Pre-Surgery Instructions:   Medication Instructions    ascorbic acid (VITAMIN C) 500 MG tablet Surgeon prescribed -Instructed to use up to and including 1/19/25     atorvastatin (LIPITOR) 20 mg tablet Take day of surgery. With sip of water     Cholecalciferol (VITAMIN D-3 PO) Stop taking 7 days prior to surgery.    ferrous sulfate 324 (65 Fe) mg Surgeon prescribed-Instructed to use up to and including 1/19/25    folic acid (FOLVITE) 1 mg tablet Surgeon prescribed -Instructed to use up to and including 1/19/25     MAGNESIUM PO Stop taking 7 days prior to surgery.    Multiple Vitamins-Minerals (multivitamin with minerals) tablet Surgeon prescribed -Instructed to use up to and including 1/19/25     Multiple Vitamins-Minerals (ZINC PO) Stop taking 7 days prior to surgery.    mupirocin (BACTROBAN) 2 % ointment Instructions provided by MD-  5 days prior to surgery BID and then am DOS     omeprazole (PriLOSEC) 20 mg delayed release capsule Take day of surgery. With sip of water     POTASSIUM PO Stop taking 7 days prior to surgery.    TURMERIC PO Stop taking 7 days prior to surgery.    Pt instructed on use of cleanser for DOS and instructions for showering both night before and DOS reviewed with pt - pt verbalized understanding of instructions given  Pt also instructed on no hair or body products on skin for DOS.  No shaving with a razor blade for 7 days prior to surgery to decrease any incident of infection - electric razor is ok to use up till 24 hrs prior to DOS.  Pt instructed that if with any changes in health status between now and DOS - notify surgeon office.  Tylenol is ok to use as needed up to and including DOS with sips of water - if needed - did instruct NO NSAIDS to be used at least 3 days prior to surgery - or if given a longer hold time of NSAIDS from MD please follow MD hold instructions.  Instructed to bring photo ID and medical insurance card for DOS as forms of identification for DOS.  Also instructed pt  on no jewelry or body piercings, no valuables on body for DOS. Contact lenses, if worn - need to be removed for DOS.  Pt instructed does need transport home after surgery- pt is not allowed to drive.    Pt aware of Bactroban ointment use 5 days prior to DOS BID and then am DOS -- also showering instructions reviewed with pt 5 days prior to DOS and then again am DOS.   Instructed to bring CPAP for DOS  Given Marianne Barrios phone#  [phone] for any questions moving forward for DOS - after today's phone call.    Medication instructions for day surgery reviewed. Please use only a sip of water to take your instructed medications. Avoid all over the counter vitamins, supplements and NSAIDS for one week prior to surgery per anesthesia guidelines. Tylenol is ok to take as needed.     You will receive a call one business day prior to surgery with an arrival time and hospital directions. If your surgery is scheduled on a Monday, the hospital will be calling you on the Friday prior to your surgery. If you have not heard from anyone by 8pm, please call the hospital supervisor through the hospital  at [phone]. ([place] [phone] or [place] [phone]).    Do not eat or drink anything after midnight the night before your surgery, including candy, mints, lifesavers, or chewing gum. Do not drink alcohol 24hrs before your surgery. Try not to smoke at least 24hrs before your surgery.       Follow the pre surgery showering instructions as listed in the “My Surgical Experience Booklet” or otherwise provided by your surgeon's office. Do not use a blade to shave the surgical area 1 week before surgery. It is okay to use a clean electric clippers up to 24 hours before surgery. Do not apply any lotions, creams, including makeup, cologne, deodorant, or perfumes after showering on the day of your surgery. Do not use dry shampoo, hair spray, hair gel, or any type of hair products.     No contact lenses, eye make-up, or  artificial eyelashes. Remove nail polish, including gel polish, and any artificial, gel, or acrylic nails if possible. Remove all jewelry including rings and body piercing jewelry.     Wear causal clothing that is easy to take on and off. Consider your type of surgery.    Keep any valuables, jewelry, piercings at home. Please bring any specially ordered equipment (sling, braces) if indicated.    Arrange for a responsible person to drive you to and from the hospital on the day of your surgery. Please confirm the visitor policy for the day of your procedure when you receive your phone call with an arrival time.     Call the surgeon's office with any new illnesses, exposures, or additional questions prior to surgery.    Please reference your “My Surgical Experience Booklet” for additional information to prepare for your upcoming surgery.

## 2025-01-13 ENCOUNTER — EVALUATION (OUTPATIENT)
Dept: PHYSICAL THERAPY | Facility: REHABILITATION | Age: 60
End: 2025-01-13
Payer: COMMERCIAL

## 2025-01-13 DIAGNOSIS — Z96.652 AFTERCARE FOLLOWING LEFT KNEE JOINT REPLACEMENT SURGERY: Primary | ICD-10-CM

## 2025-01-13 DIAGNOSIS — G89.29 CHRONIC PAIN OF LEFT KNEE: ICD-10-CM

## 2025-01-13 DIAGNOSIS — M25.562 CHRONIC PAIN OF LEFT KNEE: ICD-10-CM

## 2025-01-13 DIAGNOSIS — Z47.1 AFTERCARE FOLLOWING LEFT KNEE JOINT REPLACEMENT SURGERY: Primary | ICD-10-CM

## 2025-01-13 DIAGNOSIS — M17.12 PRIMARY OSTEOARTHRITIS OF LEFT KNEE: Primary | ICD-10-CM

## 2025-01-13 PROCEDURE — 97161 PT EVAL LOW COMPLEX 20 MIN: CPT

## 2025-01-13 PROCEDURE — 97535 SELF CARE MNGMENT TRAINING: CPT

## 2025-01-13 NOTE — PROGRESS NOTES
PT Evaluation     Today's date: 2025  Patient name: Sg Gomez  : 1965  MRN: [id number]  Referring provider: Sherman Pierce,*  Dx:   Encounter Diagnosis     ICD-10-CM    1. Primary osteoarthritis of left knee  M17.12 Ambulatory referral to Physical Therapy      2. Chronic pain of left knee  M25.562 Ambulatory Referral to Physical Therapy    G89.29                      Assessment  Impairments: abnormal coordination, abnormal gait, abnormal or restricted ROM, activity intolerance, impaired balance, impaired physical strength, lacks appropriate home exercise program and pain with function    Assessment details: Pt is a pleasant 59 y.o. male presenting to outpatient physical therapy with Primary osteoarthritis of left knee  (primary encounter diagnosis)Chronic pain of left knee for pre-operative consultation appointment. Virtual Home Assessment reviewed, educated patient in gait and stair mechanics, provided transfer training, and answered questions regarding negotiating around the home. Patient issued HEP for immediate post-op. Patient scheduled for first post-operative physical therapy session 2 days following surgery. Pt would benefit from skilled physical therapy to address limitations and to achieve goals. Thank you for this referral.   Understanding of Dx/Px/POC: good     Prognosis: good    Goals  ST. Patient will report 25% decrease in pain with ambulation in 4 weeks.  2. Patient will demonstrate 25% improvement in ROM in 4 weeks.  3. Patient will be able to ambulate 200ft with SPC independently in 4 weeks.    LT. Patient will be able to perform IADLS without restriction or pain by discharge.  2. Patient will be independent in HEP by discharge.  3. Patient will be able to return to recreational/work duties without restriction or pain by discharge.  4. Patient will be able to ambulate community distances without compensation by discharge.   5. Patient will be able to return to  work related tasks without limitations by d/c to demonstrate return to PLOF       Plan  Patient would benefit from: PT eval and skilled PT  Planned modality interventions: cryotherapy    Planned therapy interventions: IADL retraining, body mechanics training, flexibility, functional ROM exercises, home exercise program, neuromuscular re-education, manual therapy, postural training, strengthening, stretching, therapeutic activities, therapeutic exercise, gait training, transfer training, self care, patient education, balance/weight bearing training, ADL retraining and balance    Frequency: 2x week  Duration in weeks: 12  Treatment plan discussed with: patient        Subjective Evaluation    History of Present Illness  Mechanism of injury: Pt reports that his knee has bothered him for years. 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 Pt reports that both of his knees are shot. Pt reports that his knee bothers him the most with stairs. Pt reports that his legs also feel weak on him and the cold does not feel good on his joints. Pt denies any falls.   Patient Goals  Patient goals for therapy: decreased pain, increased motion, improved balance, increased strength, independence with ADLs/IADLs, return to sport/leisure activities and return to work  Patient goal: go back to work as soon as possible for the finacial aspect of it  Pain  Current pain ratin  At best pain ratin  At worst pain ratin  Quality: tight, dull ache and discomfort  Aggravating factors: stair climbing    Social Support  Steps to enter house: yes (2 steps, no railing)  Stairs in house: yes (to basement)   Patient lives at: [employer].    Employment status: working (Carpentry, Construction)        Objective     Active Range of Motion   Left Knee   Flexion: 117 degrees   Extension: -3 degrees     Right Knee   Flexion: 122 degrees   Extension: 0 degrees     Strength/Myotome Testing     Left Hip   Planes of Motion   Flexion: 5    Right Hip   Planes of  Motion   Flexion: 5    Left Knee   Flexion: 5  Extension: 5  Quadriceps contraction: poor    Right Knee   Flexion: 5  Extension: 5  Quadriceps contraction: good    Left Ankle/Foot   Dorsiflexion: 5    Right Ankle/Foot   Dorsiflexion: 5                 No Authorization Required   Billing Guidelines CMS    Copay/Co-Insurance  $45.00 sent to secondary   Visit Limit  30V PCY       Precautions:   Patient Active Problem List   Diagnosis    Obstructive sleep apnea treated with continuous positive airway pressure (CPAP)    Obesity (BMI 35.0-39.9 without comorbidity)    Mixed hyperlipidemia    Gastroesophageal reflux disease    Hyperglycemia    Left knee pain    Chronic left shoulder pain         1/13            Visit number  1            FOTO NV  Post-op           IE/RE Pre-op Post-op              Manuals             L knee PROM              L patellar mobility              L gastroc, hamstring stretch                           Neuro Re-Ed             Quad set              Quad set with SLR              SLS              Tandem Stance              TKE                                        Ther Ex             Bike              Heel slide              HR/TR              LAQ, SAQ              Side stepping              Mini squats              Calf Stretch              Hamstring stretch              Ther Activity                                       Gait Training             With RW              With SPC              Modalities             CP PRN

## 2025-01-17 NOTE — DISCHARGE INSTR - AVS FIRST PAGE
Discharge Instructions: Knee replacement with Dr. Pierce    Weight Bearing Status:                                           Weight Bearing as tolerated to the left lower extremity with assistive devices.     Pain Management/Medications  You may resume your usual medications.  You may stop pre-operative vitamins (Folic acid, Ferrous sulfate, and Vitamin C).   Please take the following medications:  Anti-coagulation (blood clot prevention) - Complete Lovenox injections for 28 days.   Pain medication:  Oxycodone 5 m tablet every 6 hours as needed for severe pain  Robaxin (Muscle relaxer) 500 m tablet up to 3 times a day as needed for mild pain and muscle discomfort  Tylenol 1000 mg: up to three times daily as needed for mild to moderate pain. Do not exceed 3000mg daily   Continue applying ice to your knee on and off for about 20 minutes as needed.  Continue to elevate your operative leg, with ankle above the level of your heart when possible.    If you have questions or pain concerns, please contact the office.   If you need refills of your medications prior to your next office visit, please contact the office.     Showering/Dressing Instructions:   Do not shower until first follow up appointment.  Keep surgical dressing clean and dry until follow up appointment.  You may adjust the ACE bandage as it slides down   No baths, swimming, or submerging your leg until cleared to do so.      Driving Instructions:  No driving until cleared by Orthopaedic Surgery.    PT/OT:  Continue PT/OT on outpatient basis as directed    Follow up instructions:   Follow up as scheduled on 2025 in [place]  If you need to change or cancel your appointment for any reason, please call the clinic at [phone]    Please contact the office if you experience any of the following:  Excessive bleeding (bleeding through your dressing)  Fever greater than 101 degrees F after 48 hours (low grade fevers the day or two after surgery  are normal)  Persistent nausea or vomiting  Decreased sensation or discoloration of the operative limb  Pain or swelling that is getting worse and not better with medication    Miscellaneous:  Advise against any dental cleanings or procedures for 3 months after surgery.   - If there is a dental emergency, please contact the office for further instructions.

## 2025-01-19 NOTE — ANESTHESIA PREPROCEDURE EVALUATION
Procedure:  ARTHROPLASTY KNEE TOTAL W ROBOT (Left: Knee)    Relevant Problems   CARDIO   (+) Mixed hyperlipidemia      GI/HEPATIC   (+) Gastroesophageal reflux disease      NEURO/PSYCH   (+) Chronic left shoulder pain      PULMONARY   (+) Obstructive sleep apnea treated with continuous positive airway pressure (CPAP)      Lab Results   Component Value Date    WBC 5.11 12/27/2024    HGB 14.6 12/27/2024    HCT 45.8 12/27/2024    MCV 95 12/27/2024     12/27/2024       Physical Exam    Airway    Mallampati score: I  TM Distance: >3 FB  Neck ROM: full     Dental   No notable dental hx     Cardiovascular  Rhythm: regular, Rate: normal    Pulmonary   Breath sounds clear to auscultation    Other Findings  Intercisor Distance > 3cm          Anesthesia Plan  ASA Score- 2     Anesthesia Type- spinal with ASA Monitors.         Additional Monitors:     Airway Plan:     Comment: Discussed benefits/risks of neuraxial anesthesia including possibility of discomfort at site of injection, post-dural puncture headache, block failure, and more rare complications such as bleeding, infection, and permanent nerve damage. If block fails or there is difficulty placing neuraxial block, general anesthesia was discussed as back-up plan. Patient understands and wishes to proceed.     Discussed nerve block to assist with post-operative analgesia. Discussed possibility of uncommon complications including permanent nerve injury, damage to blood vessels, infection local anesthetic toxicity, and nerve block failure. Patient understands and wishes to proceed.     All questions answered.   .       Plan Factors-Exercise tolerance (METS): >4 METS.    Chart reviewed. EKG reviewed.  Existing labs reviewed.                   Induction-     Postoperative Plan- Plan for postoperative opioid use.         Informed Consent- Anesthetic plan and risks discussed with patient.  I personally reviewed this patient with the CRNA. Discussed and agreed on the  Anesthesia Plan with the CRNA..      NPO Status:  No vitals data found for the desired time range.

## 2025-01-20 ENCOUNTER — ANESTHESIA (OUTPATIENT)
Age: 60
End: 2025-01-20
Payer: COMMERCIAL

## 2025-01-20 ENCOUNTER — HOSPITAL ENCOUNTER (OUTPATIENT)
Age: 60
Setting detail: OUTPATIENT SURGERY
Discharge: HOME | End: 2025-01-21
Attending: ORTHOPAEDIC SURGERY | Admitting: ORTHOPAEDIC SURGERY
Payer: COMMERCIAL

## 2025-01-20 DIAGNOSIS — M17.12 PRIMARY OSTEOARTHRITIS OF LEFT KNEE: ICD-10-CM

## 2025-01-20 DIAGNOSIS — M17.12 PRIMARY OSTEOARTHRITIS OF LEFT KNEE: Primary | ICD-10-CM

## 2025-01-20 PROCEDURE — NC001 PR NO CHARGE: Performed by: ORTHOPAEDIC SURGERY

## 2025-01-20 PROCEDURE — S2900 ROBOTIC SURGICAL SYSTEM: HCPCS | Performed by: ORTHOPAEDIC SURGERY

## 2025-01-20 PROCEDURE — C1776 JOINT DEVICE (IMPLANTABLE): HCPCS | Performed by: ORTHOPAEDIC SURGERY

## 2025-01-20 PROCEDURE — 97163 PT EVAL HIGH COMPLEX 45 MIN: CPT | Performed by: PHYSICAL THERAPIST

## 2025-01-20 PROCEDURE — 27447 TOTAL KNEE ARTHROPLASTY: CPT

## 2025-01-20 PROCEDURE — 99254 IP/OBS CNSLTJ NEW/EST MOD 60: CPT | Performed by: INTERNAL MEDICINE

## 2025-01-20 PROCEDURE — 97167 OT EVAL HIGH COMPLEX 60 MIN: CPT

## 2025-01-20 PROCEDURE — 27447 TOTAL KNEE ARTHROPLASTY: CPT | Performed by: ORTHOPAEDIC SURGERY

## 2025-01-20 PROCEDURE — C1713 ANCHOR/SCREW BN/BN,TIS/BN: HCPCS | Performed by: ORTHOPAEDIC SURGERY

## 2025-01-20 DEVICE — ATTUNE PATELLA MEDIALIZED DOME 41MM CEMENTED AOX
Type: IMPLANTABLE DEVICE | Site: TIBIA | Status: FUNCTIONAL
Brand: ATTUNE

## 2025-01-20 DEVICE — ATTUNE KNEE SYSTEM TIBIAL BASE FIXED BEARING SIZE 7 CEMENTED
Type: IMPLANTABLE DEVICE | Site: TIBIA | Status: FUNCTIONAL
Brand: ATTUNE

## 2025-01-20 DEVICE — ATTUNE KNEE SYSTEM TIBIAL INSERT FIXED BEARING POSTERIOR STABILIZED 8 7MM AOX
Type: IMPLANTABLE DEVICE | Site: KNEE | Status: FUNCTIONAL
Brand: ATTUNE

## 2025-01-20 DEVICE — ATTUNE KNEE SYSTEM FEMORAL POSTERIOR STABILIZED SIZE 8 LEFT CEMENTED
Type: IMPLANTABLE DEVICE | Site: FEMUR | Status: FUNCTIONAL
Brand: ATTUNE

## 2025-01-20 DEVICE — SMARTSET HV HIGH VISCOSITY BONE CEMENT 40G
Type: IMPLANTABLE DEVICE | Status: FUNCTIONAL
Brand: SMARTSET

## 2025-01-20 RX ORDER — CEFAZOLIN SODIUM 1 G/50ML
1000 SOLUTION INTRAVENOUS EVERY 8 HOURS
Status: COMPLETED | OUTPATIENT
Start: 2025-01-20 | End: 2025-01-21

## 2025-01-20 RX ORDER — DOCUSATE SODIUM 100 MG/1
100 CAPSULE, LIQUID FILLED ORAL 2 TIMES DAILY
Status: DISCONTINUED | OUTPATIENT
Start: 2025-01-20 | End: 2025-01-21 | Stop reason: HOSPADM

## 2025-01-20 RX ORDER — ACETAMINOPHEN 325 MG/1
975 TABLET ORAL ONCE
Status: COMPLETED | OUTPATIENT
Start: 2025-01-20 | End: 2025-01-20

## 2025-01-20 RX ORDER — PANTOPRAZOLE SODIUM 40 MG/1
40 TABLET, DELAYED RELEASE ORAL
Status: DISCONTINUED | OUTPATIENT
Start: 2025-01-21 | End: 2025-01-21 | Stop reason: HOSPADM

## 2025-01-20 RX ORDER — METHOCARBAMOL 500 MG/1
500 TABLET, FILM COATED ORAL EVERY 6 HOURS SCHEDULED
Status: DISCONTINUED | OUTPATIENT
Start: 2025-01-20 | End: 2025-01-21 | Stop reason: HOSPADM

## 2025-01-20 RX ORDER — ATORVASTATIN CALCIUM 20 MG/1
20 TABLET, FILM COATED ORAL
Status: DISCONTINUED | OUTPATIENT
Start: 2025-01-20 | End: 2025-01-21 | Stop reason: HOSPADM

## 2025-01-20 RX ORDER — ACETAMINOPHEN 325 MG/1
975 TABLET ORAL EVERY 6 HOURS PRN
Status: DISCONTINUED | OUTPATIENT
Start: 2025-01-20 | End: 2025-01-21 | Stop reason: HOSPADM

## 2025-01-20 RX ORDER — PROPOFOL 10 MG/ML
INJECTION, EMULSION INTRAVENOUS CONTINUOUS PRN
Status: DISCONTINUED | OUTPATIENT
Start: 2025-01-20 | End: 2025-01-20

## 2025-01-20 RX ORDER — CELECOXIB 200 MG/1
200 CAPSULE ORAL ONCE
Status: COMPLETED | OUTPATIENT
Start: 2025-01-20 | End: 2025-01-20

## 2025-01-20 RX ORDER — TRANEXAMIC ACID 10 MG/ML
1000 INJECTION, SOLUTION INTRAVENOUS ONCE
Status: COMPLETED | OUTPATIENT
Start: 2025-01-20 | End: 2025-01-20

## 2025-01-20 RX ORDER — ONDANSETRON 2 MG/ML
4 INJECTION INTRAMUSCULAR; INTRAVENOUS EVERY 6 HOURS PRN
Status: DISCONTINUED | OUTPATIENT
Start: 2025-01-20 | End: 2025-01-21 | Stop reason: HOSPADM

## 2025-01-20 RX ORDER — METHOCARBAMOL 500 MG/1
500 TABLET, FILM COATED ORAL 3 TIMES DAILY PRN
Qty: 60 TABLET | Refills: 0 | Status: SHIPPED | OUTPATIENT
Start: 2025-01-20

## 2025-01-20 RX ORDER — GABAPENTIN 100 MG/1
100 CAPSULE ORAL EVERY 8 HOURS
Status: DISCONTINUED | OUTPATIENT
Start: 2025-01-20 | End: 2025-01-21 | Stop reason: HOSPADM

## 2025-01-20 RX ORDER — CALCIUM CARBONATE 500 MG/1
1000 TABLET, CHEWABLE ORAL DAILY PRN
Status: DISCONTINUED | OUTPATIENT
Start: 2025-01-20 | End: 2025-01-21 | Stop reason: HOSPADM

## 2025-01-20 RX ORDER — FENTANYL CITRATE/PF 50 MCG/ML
50 SYRINGE (ML) INJECTION
Status: DISCONTINUED | OUTPATIENT
Start: 2025-01-20 | End: 2025-01-20 | Stop reason: HOSPADM

## 2025-01-20 RX ORDER — OXYCODONE HYDROCHLORIDE 5 MG/1
5 TABLET ORAL EVERY 6 HOURS PRN
Qty: 30 TABLET | Refills: 0 | Status: SHIPPED | OUTPATIENT
Start: 2025-01-20 | End: 2025-01-30

## 2025-01-20 RX ORDER — HYDROMORPHONE HCL/PF 1 MG/ML
0.5 SYRINGE (ML) INJECTION EVERY 2 HOUR PRN
Status: DISCONTINUED | OUTPATIENT
Start: 2025-01-20 | End: 2025-01-21 | Stop reason: HOSPADM

## 2025-01-20 RX ORDER — ACETAMINOPHEN 500 MG
1000 TABLET ORAL EVERY 6 HOURS PRN
Qty: 90 TABLET | Refills: 0 | Status: SHIPPED | OUTPATIENT
Start: 2025-01-20 | End: 2025-01-21

## 2025-01-20 RX ORDER — SODIUM CHLORIDE, SODIUM LACTATE, POTASSIUM CHLORIDE, CALCIUM CHLORIDE 600; 310; 30; 20 MG/100ML; MG/100ML; MG/100ML; MG/100ML
125 INJECTION, SOLUTION INTRAVENOUS CONTINUOUS
Status: DISCONTINUED | OUTPATIENT
Start: 2025-01-20 | End: 2025-01-21 | Stop reason: HOSPADM

## 2025-01-20 RX ORDER — OXYCODONE HYDROCHLORIDE 5 MG/1
5 TABLET ORAL EVERY 4 HOURS PRN
Status: DISCONTINUED | OUTPATIENT
Start: 2025-01-20 | End: 2025-01-21 | Stop reason: HOSPADM

## 2025-01-20 RX ORDER — HYDROMORPHONE HCL IN WATER/PF 6 MG/30 ML
0.2 PATIENT CONTROLLED ANALGESIA SYRINGE INTRAVENOUS
Status: DISCONTINUED | OUTPATIENT
Start: 2025-01-20 | End: 2025-01-20 | Stop reason: HOSPADM

## 2025-01-20 RX ORDER — BUPIVACAINE HYDROCHLORIDE 2.5 MG/ML
INJECTION, SOLUTION EPIDURAL; INFILTRATION; INTRACAUDAL
Status: COMPLETED | OUTPATIENT
Start: 2025-01-20 | End: 2025-01-20

## 2025-01-20 RX ORDER — ENOXAPARIN SODIUM 100 MG/ML
40 INJECTION SUBCUTANEOUS DAILY
Status: DISCONTINUED | OUTPATIENT
Start: 2025-01-20 | End: 2025-01-21 | Stop reason: HOSPADM

## 2025-01-20 RX ORDER — CHLORHEXIDINE GLUCONATE ORAL RINSE 1.2 MG/ML
15 SOLUTION DENTAL ONCE
Status: COMPLETED | OUTPATIENT
Start: 2025-01-20 | End: 2025-01-20

## 2025-01-20 RX ORDER — OXYCODONE HYDROCHLORIDE 10 MG/1
10 TABLET ORAL EVERY 4 HOURS PRN
Status: DISCONTINUED | OUTPATIENT
Start: 2025-01-20 | End: 2025-01-21 | Stop reason: HOSPADM

## 2025-01-20 RX ORDER — ONDANSETRON 2 MG/ML
4 INJECTION INTRAMUSCULAR; INTRAVENOUS ONCE AS NEEDED
Status: DISCONTINUED | OUTPATIENT
Start: 2025-01-20 | End: 2025-01-20 | Stop reason: HOSPADM

## 2025-01-20 RX ORDER — CEFAZOLIN SODIUM 2 G/50ML
2000 SOLUTION INTRAVENOUS ONCE
Status: COMPLETED | OUTPATIENT
Start: 2025-01-20 | End: 2025-01-20

## 2025-01-20 RX ORDER — DEXAMETHASONE SODIUM PHOSPHATE 10 MG/ML
INJECTION, SOLUTION INTRAMUSCULAR; INTRAVENOUS AS NEEDED
Status: DISCONTINUED | OUTPATIENT
Start: 2025-01-20 | End: 2025-01-20

## 2025-01-20 RX ORDER — MIDAZOLAM HYDROCHLORIDE 2 MG/2ML
INJECTION, SOLUTION INTRAMUSCULAR; INTRAVENOUS AS NEEDED
Status: DISCONTINUED | OUTPATIENT
Start: 2025-01-20 | End: 2025-01-20

## 2025-01-20 RX ORDER — ONDANSETRON 2 MG/ML
INJECTION INTRAMUSCULAR; INTRAVENOUS AS NEEDED
Status: DISCONTINUED | OUTPATIENT
Start: 2025-01-20 | End: 2025-01-20

## 2025-01-20 RX ADMIN — CELECOXIB 200 MG: 200 CAPSULE ORAL at 09:59

## 2025-01-20 RX ADMIN — OXYCODONE 5 MG: 5 TABLET ORAL at 14:53

## 2025-01-20 RX ADMIN — ATORVASTATIN CALCIUM 20 MG: 20 TABLET, FILM COATED ORAL at 17:27

## 2025-01-20 RX ADMIN — ACETAMINOPHEN 325MG 975 MG: 325 TABLET ORAL at 09:58

## 2025-01-20 RX ADMIN — SODIUM CHLORIDE, SODIUM LACTATE, POTASSIUM CHLORIDE, AND CALCIUM CHLORIDE 125 ML/HR: .6; .31; .03; .02 INJECTION, SOLUTION INTRAVENOUS at 14:41

## 2025-01-20 RX ADMIN — METHOCARBAMOL TABLETS 500 MG: 500 TABLET, COATED ORAL at 14:41

## 2025-01-20 RX ADMIN — ONDANSETRON 4 MG: 2 INJECTION INTRAMUSCULAR; INTRAVENOUS at 11:31

## 2025-01-20 RX ADMIN — CEFAZOLIN SODIUM 2000 MG: 2 SOLUTION INTRAVENOUS at 11:16

## 2025-01-20 RX ADMIN — METHOCARBAMOL TABLETS 500 MG: 500 TABLET, COATED ORAL at 23:11

## 2025-01-20 RX ADMIN — DEXAMETHASONE SODIUM PHOSPHATE 10 MG: 10 INJECTION, SOLUTION INTRAMUSCULAR; INTRAVENOUS at 11:31

## 2025-01-20 RX ADMIN — BUPIVACAINE HYDROCHLORIDE 20 ML: 2.5 INJECTION, SOLUTION EPIDURAL; INFILTRATION; INTRACAUDAL; PERINEURAL at 10:56

## 2025-01-20 RX ADMIN — MIDAZOLAM 2 MG: 1 INJECTION INTRAMUSCULAR; INTRAVENOUS at 10:55

## 2025-01-20 RX ADMIN — TRANEXAMIC ACID 1000 MG: 10 INJECTION, SOLUTION INTRAVENOUS at 11:29

## 2025-01-20 RX ADMIN — CEFAZOLIN SODIUM 1000 MG: 1 SOLUTION INTRAVENOUS at 19:36

## 2025-01-20 RX ADMIN — BUPIVACAINE HYDROCHLORIDE 20 ML: 2.5 INJECTION, SOLUTION EPIDURAL; INFILTRATION; INTRACAUDAL at 10:56

## 2025-01-20 RX ADMIN — OXYCODONE 5 MG: 5 TABLET ORAL at 22:32

## 2025-01-20 RX ADMIN — ENOXAPARIN SODIUM 40 MG: 40 INJECTION SUBCUTANEOUS at 22:32

## 2025-01-20 RX ADMIN — GABAPENTIN 100 MG: 100 CAPSULE ORAL at 14:41

## 2025-01-20 RX ADMIN — BUPIVACAINE 20 ML: 13.3 INJECTION, SUSPENSION, LIPOSOMAL INFILTRATION at 10:56

## 2025-01-20 RX ADMIN — SODIUM CHLORIDE, SODIUM LACTATE, POTASSIUM CHLORIDE, AND CALCIUM CHLORIDE 125 ML/HR: .6; .31; .03; .02 INJECTION, SOLUTION INTRAVENOUS at 22:24

## 2025-01-20 RX ADMIN — MEPIVACAINE HYDROCHLORIDE 4 ML: 15 INJECTION, SOLUTION EPIDURAL; INFILTRATION at 11:23

## 2025-01-20 RX ADMIN — GABAPENTIN 100 MG: 100 CAPSULE ORAL at 22:31

## 2025-01-20 RX ADMIN — BUPIVACAINE HYDROCHLORIDE 30 ML: 2.5 INJECTION, SOLUTION EPIDURAL; INFILTRATION; INTRACAUDAL at 10:56

## 2025-01-20 RX ADMIN — DOCUSATE SODIUM 100 MG: 100 CAPSULE, LIQUID FILLED ORAL at 18:27

## 2025-01-20 RX ADMIN — SODIUM CHLORIDE, SODIUM LACTATE, POTASSIUM CHLORIDE, AND CALCIUM CHLORIDE: .6; .31; .03; .02 INJECTION, SOLUTION INTRAVENOUS at 11:59

## 2025-01-20 RX ADMIN — SODIUM CHLORIDE, SODIUM LACTATE, POTASSIUM CHLORIDE, AND CALCIUM CHLORIDE 125 ML/HR: .6; .31; .03; .02 INJECTION, SOLUTION INTRAVENOUS at 09:59

## 2025-01-20 RX ADMIN — CHLORHEXIDINE GLUCONATE 15 ML: 1.2 RINSE ORAL at 09:59

## 2025-01-20 RX ADMIN — PROPOFOL 100 MCG/KG/MIN: 10 INJECTION, EMULSION INTRAVENOUS at 11:25

## 2025-01-20 NOTE — PLAN OF CARE
Problem: OCCUPATIONAL THERAPY ADULT  Goal: Performs self-care activities at highest level of function for planned discharge setting.  See evaluation for individualized goals.  Description: Treatment Interventions: ADL retraining, Functional transfer training, Endurance training, Patient/family training, Neuromuscular reeducation, Compensatory technique education, Continued evaluation, Energy conservation, Activityengagement          See flowsheet documentation for full assessment, interventions and recommendations.   Note: Limitation: Decreased ADL status, Decreased endurance, Decreased self-care trans, Decreased high-level ADLs  Prognosis: Good  Assessment: Pt is a 59 y.o. male seen for OT evaluation s/p adm to [facility] on 1[facility]20[facility]2025 w/ primary osteoarthritis of left knee . Comorbidities affecting pt’s functional performance include a significant PMH of GERD, high cholesterol, SAADIA. Pt with active OT orders and activity orders for Activity beginning POD #0. WBAT LLE. Hemovac. Pt lives with spouse in a ranch style house with [place]. Pt has tub/shower and ordered raised toilet seat. At baseline, pt was independent with all ADLs/IADLs. Pt completed supine to sit with S. Pt completed sit to stand with Peterson and use of RW. Pt completed functional mobility functional distance with use of RW and S. Pt completed stand to sit into chair with use of RW and S with use of armrests and S.Upon evaluation, pt currently requires S for UB ADLs, Peterson for LB ADLs, S for toileting, S for bed mobility, S for functional mobility, and Peterson for transfers 2* the following deficits impacting occupational performance: weakness, decreased strength , decreased balance, decreased activity tolerance, increased pain, and orthopedic restrictions. These impairments, as well at pt’s personal factors of: [place] home environment, difficulty performing ADLs, difficulty performing IADLs, difficulty performing transfers/mobility, WBS, fall risk , and  new use of AD for functional transfers/mobility limit pt’s ability to safely engage in all baseline areas of occupation. Based on the aforementioned OT evaluation, functional performance deficits, and assessments, pt has been identified as a high complexity evaluation. Pt to continue to benefit from continued acute OT services during hospital stay to address defined deficits and to maximize level of functional independence in the following Occupational Performance areas: grooming, bathing/shower, toilet hygiene, dressing, health maintenance, functional mobility, community mobility, clothing management, cleaning, household maintenance, and job performance/volunteering. From OT standpoint, recommend No post-acute rehabilitation needs upon D/C. OT will continue to follow pt 3-5x/wk.     Rehab Resource Intensity Level, OT: No post-acute rehabilitation needs

## 2025-01-20 NOTE — ANESTHESIA PROCEDURE NOTES
Peripheral Block    Patient location during procedure: pre-op  Start time: 1/20/2025 10:56 AM  Reason for block: at surgeon's request and post-op pain management  Staffing  Performed by: Hermelindo Harrison MD  Authorized by: Hermelindo Harrison MD    Preanesthetic Checklist  Completed: patient identified, IV checked, site marked, risks and benefits discussed, surgical consent, monitors and equipment checked, pre-op evaluation and timeout performed  Peripheral Block  Patient position: supine  Prep: ChloraPrep  Patient monitoring: heart rate, continuous pulse ox and frequent blood pressure checks  Anesthesia block type: Genicular.  Laterality: left  Injection technique: single-shot  Procedures: ultrasound guided, Ultrasound guidance required for the procedure to increase accuracy and safety of medication placement and decrease risk of complications.  Ultrasound permanent image saved  bupivacaine (PF) (MARCAINE) 0.25 % injection 20 mL - Perineural   30 mL - 1/20/2025 10:56:00 AM  Needle  Needle type: Stimuplex   Needle gauge: 20 G  Needle length: 4 in  Needle localization: ultrasound guidance  Assessment  Injection assessment: incremental injection, local visualized surrounding nerve on ultrasound, negative aspiration for heme and no paresthesia on injection  Paresthesia pain: none  patient tolerated the procedure well with no immediate complications  Additional Notes  Targets: Superior-medial, superior-lateral, inferior-medial, inferior-lateral, recurrent peroneal   Supplemented with vastus intermedius block

## 2025-01-20 NOTE — PLAN OF CARE
Problem: PHYSICAL THERAPY ADULT  Goal: Performs mobility at highest level of function for planned discharge setting.  See evaluation for individualized goals.  Description: Treatment/Interventions: Functional transfer training, LE strengthening/ROM, Elevations, Therapeutic exercise, Endurance training, Patient/family training, Bed mobility, Gait training, Spoke to nursing, OT  Equipment Recommended: Walker (pt owns)       See flowsheet documentation for full assessment, interventions and recommendations.  Note: Prognosis: Good  Problem List: Decreased strength, Decreased range of motion, Decreased endurance, Impaired balance, Decreased mobility, Pain  Assessment: Pt. 59 y.o.male presents for elective surgery. Past medical hx includes SAADIA, HTN. Pt admitted for Primary osteoarthritis of left knee (M17.12). S/p L elective TKR POD #0. Pt referred to PT for functional mobility evaluation & D/C planning w/ orders of  ambulate with RW . WBAT LLE. PTA, pt reports being I w/o AD. Personal factors affecting pt at time of IE include: decreased independence in ADLs/IADLs and steps to enter. During evaluation, deficits included dec mobility, balance, ambulation. Required S for supine to sit, minAx1 for sit to stand, S for ambulation and stand to sit. Pt able to ambulate 60' with RW in unit. Antalgic step to gait with improved gait speed with inc distance traveled. Pt demonstrated dec endurance and tolerance to activity. Denies reports of dizziness or SOB t/o session. Pt was educated on fall precautions and reinforced w/ good understanding. Pt would benefit from continued PT to address deficits as defined above and maximize level of independence with functional mobility and safety. Based on pt presentation and impaired function, pt would benefit from level III, (minimum resource intensity) at D/C. The patient's AM-PAC Basic Mobility Inpatient Short Form Raw Score is 20. A Raw score of greater than 16 suggests the patient may  benefit from discharge to home. Please also refer to the recommendation of the Physical Therapist for safe discharge planning. Nsg staff to continue to mobilized pt (OOB in chair for all meals & ambulate in room/unit) as tolerated to prevent further decline in function. Nsg notified. Co-eval performed to complete this PT evaluation for the pts best interest given pts medical complexity and functional level.  Barriers to Discharge: Inaccessible home environment  Barriers to Discharge Comments: [place]  Rehab Resource Intensity Level, PT: III (Minimum Resource Intensity)    See flowsheet documentation for full assessment.

## 2025-01-20 NOTE — OCCUPATIONAL THERAPY NOTE
"    Occupational Therapy Evaluation     Patient Name: Sg Gomez  Today's Date: 1/20/2025  Problem List  Principal Problem:    Primary osteoarthritis of left knee    Past Medical History  Past Medical History:   Diagnosis Date    CPAP (continuous positive airway pressure) dependence     GERD (gastroesophageal reflux disease)     High cholesterol     Knee pain, left     Obstructive sleep apnea     Last Assessed:6/8/16     Past Surgical History  Past Surgical History:   Procedure Laterality Date    COLONOSCOPY      KNEE SURGERY Bilateral     Bursa sacs removed bilaterally    SURGERY SCROTAL / TESTICULAR      1/9/25 per pt as a child had to have his testicles dropped \"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 \"        01/20/25 1533   OT Last Visit   OT Visit Date 01/20/25   Note Type   Note type Evaluation   Additional Comments Pt greeted in supine, agreeable to OT evaluation   Pain Assessment   Pain Assessment Tool 0-10   Pain Score 4   Pain Location/Orientation Orientation: Left;Location: Knee   Hospital Pain Intervention(s) Repositioned;Ambulation/increased activity;Emotional support;Rest   Restrictions/Precautions   Weight Bearing Precautions Per Order Yes   LLE Weight Bearing Per Order WBAT   Other Precautions Chair Alarm;Bed Alarm;WBS;Multiple lines;Fall Risk;Pain  (hemovac)   Home Living   Type of Home House  (ranch)   Home Layout One level;Performs ADLs on one level;Able to live on main level with bedroom/bathroom  (1+1 [place])   Bathroom Shower/Tub Tub/shower unit   Bathroom Toilet Standard   Bathroom Equipment Toilet raiser   Bathroom Accessibility Accessible   Home Equipment Walker;Crutches   Prior Function   Level of Baker Independent with ADLs;Independent with functional mobility;Independent with IADLS   Lives With Spouse   Receives Help From Family   IADLs Independent with driving;Independent with meal prep;Independent with medication management   Falls in the last 6 months 0   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)   Comments (+) , no AD use at baseline   Lifestyle   Autonomy Independent with all ADLs/IADLs, no AD use at baseline   Reciprocal Relationships Spouse   Service to Others FTE   Intrinsic Gratification brenda marin   General   Family/Caregiver Present Yes   ADL   Where Assessed Edge of bed   Eating Assistance 5  Supervision/Setup   Grooming Assistance 5  Supervision/Setup   UB Bathing Assistance 5  Supervision/Setup   LB Bathing Assistance 4  Minimal Assistance   UB Dressing Assistance 5  Supervision/Setup   LB Dressing Assistance 4  Minimal Assistance   Toileting Assistance  5  Supervision/Setup   Bed Mobility   Supine to Sit 5  Supervision   Additional items Increased time required;Verbal cues;Bedrails;HOB elevated   Sit to Supine Unable to assess   Additional Comments pt greeted in supine, /96.   Transfers   Sit to Stand 4  Minimal assistance   Additional items Assist x 1;Increased time required;Verbal cues;HOB elevated;Bedrails  (RW)   Stand to Sit 5  Supervision   Additional items Armrests;Increased time required;Verbal cues  (RW)   Additional Comments verbal cues for hand placement and safety   Functional Mobility   Functional Mobility 5  Supervision   Additional Comments Pt completed functional mobility functional distance with use of RW and S.   Additional items Rolling walker   Balance   Static Sitting Good   Dynamic Sitting Fair +   Static Standing Fair   Dynamic Standing Fair -   Ambulatory Fair -   Activity Tolerance   Activity Tolerance Patient tolerated treatment well   Medical Staff Made Aware PT Callie, Pt seen for co-evaluation/treatment with skilled Physical Therapy due to pt's medical complexity, decreased endurance, overall functional level, overall safety, and post surgical day #0.   Nurse Made Aware NUNO LEVINE Assessment   RUE Assessment WFL   LUE Assessment   LUE Assessment WFL   Hand Function   Gross Motor Coordination Functional   Fine Motor  Coordination Functional   Cognition   Overall Cognitive Status WFL   Arousal/Participation Alert;Cooperative   Attention Within functional limits   Orientation Level Oriented X4   Memory Within functional limits   Following Commands Follows all commands and directions without difficulty   Comments Cooperative and pleasant   Assessment   Limitation Decreased ADL status;Decreased endurance;Decreased self-care trans;Decreased high-level ADLs   Prognosis Good   Assessment Pt is a 59 y.o. male seen for OT evaluation s/p adm to [facility] on 1/20/2025 w/ primary osteoarthritis of left knee . Comorbidities affecting pt’s functional performance include a significant PMH of GERD, high cholesterol, SAADIA. Pt with active OT orders and activity orders for Activity beginning POD #0. WBAT LLE. Hemovac. Pt lives with spouse in a [place] style house [place] 2 [place]. Pt has tub/shower and ordered raised toilet seat. At baseline, pt was independent with all ADLs/IADLs. Pt completed supine to sit with S. Pt completed sit to stand with Peterson and use of RW. Pt completed functional mobility functional distance with use of RW and S. Pt completed stand to sit into chair with use of RW and S with use of armrests and S.Upon evaluation, pt currently requires S for UB ADLs, Peterson for LB ADLs, S for toileting, S for bed mobility, S for functional mobility, and Peterson for transfers 2* the following deficits impacting occupational performance: weakness, decreased strength , decreased balance, decreased activity tolerance, increased pain, and orthopedic restrictions. These impairments, as well at pt’s personal factors of: [place] home environment, difficulty performing ADLs, difficulty performing IADLs, difficulty performing transfers/mobility, WBS, fall risk , and new use of AD for functional transfers/mobility limit pt’s ability to safely engage in all baseline areas of occupation. Based on the aforementioned OT evaluation, functional performance deficits,  and assessments, pt has been identified as a high complexity evaluation. Pt to continue to benefit from continued acute OT services during hospital stay to address defined deficits and to maximize level of functional independence in the following Occupational Performance areas: grooming, bathing/shower, toilet hygiene, dressing, health maintenance, functional mobility, community mobility, clothing management, cleaning, household maintenance, and job performance/volunteering. From OT standpoint, recommend No post-acute rehabilitation needs upon D/C. OT will continue to follow pt 3-5x/wk.   Goals   Patient Goals to go home   STG Time Frame 1-3   Short Term Goal #1 Pt will improve activity tolerance to G for min 30 min treatment sessions for increase engagement in functional tasks   Short Term Goal #2 Pt will complete bed mobility at a mod I level w/ G balance/safety demonstrated to decrease caregiver assistance required   Short Term Goal  Pt will complete LB dressing/self care w/ mod I using adaptive device and DME as needed   LTG Time Frame 3-7   Long Term Goal #1 Pt will complete toileting w/ mod I w/ G hygiene/thoroughness using DME as needed   Long Term Goal #2 Pt will improve functional transfers to mod I on/off all surfaces using DME as needed w/ G balance/safety   Long Term Goal Pt will improve functional mobility during ADL/IADL/leisure tasks to mod I using DME as needed w/ G balance/safety   Plan   Treatment Interventions ADL retraining;Functional transfer training;Endurance training;Patient/family training;Neuromuscular reeducation;Compensatory technique education;Continued evaluation;Energy conservation;Activityengagement   Goal Expiration Date 01/27/25   OT Treatment Day 0   OT Frequency 3-5x/wk   Discharge Recommendation   Rehab Resource Intensity Level, OT No post-acute rehabilitation needs   Additional Comments  The patient's raw score on the AM-PAC Daily Activity Inpatient Short Form is 21 . A raw  score of greater than or equal to 19 suggests the patient may benefit from discharge to home. Please refer to the recommendation of the Occupational Therapist for safe discharge planning.   AM-PAC Daily Activity Inpatient   Lower Body Dressing 3   Bathing 3   Toileting 3   Upper Body Dressing 4   Grooming 4   Eating 4   Daily Activity Raw Score 21   Daily Activity Standardized Score (Calc for Raw Score >=11) 44.27   AM-PAC Applied Cognition Inpatient   Following a Speech/Presentation 4   Understanding Ordinary Conversation 4   Taking Medications 4   Remembering Where Things Are Placed or Put Away 4   Remembering List of 4-5 Errands 4   Taking Care of Complicated Tasks 4   Applied Cognition Raw Score 24   Applied Cognition Standardized Score 62.21   End of Consult   Education Provided Yes;Family or social support of family present for education by provider   Patient Position at End of Consult Bedside chair;Bed/Chair alarm activated;All needs within reach   Nurse Communication Nurse aware of consult   End of Consult Comments Pt seated OOB in chair with chair alarm activated at end of session. Call bell and phone within reach. All needs met and pt reports no further questions for OT at this time.   Bronwyn Abreu, OT

## 2025-01-20 NOTE — OP NOTE
OPERATIVE REPORT  PATIENT NAME: Sg Gomez  : 1965  MRN: [id number]  Pt Location:  [facility] OR ROOM 04    Surgery Date: 2025    Surgeons and Role:     * Sherman Pierce MD - Primary     * Kallie Lindsey PA-C - Assisting     Preop Diagnosis:  Primary osteoarthritis of left knee [M17.12]    Post-Op Diagnosis Codes:     * Primary osteoarthritis of left knee [M17.12]    Procedure(s):  Left - ARTHROPLASTY KNEE TOTAL W ROBOT    Specimens:  * No specimens in log *    Estimated Blood Loss:   Minimal    Drains:  Urethral Catheter Non-latex 16 Fr. (Active)   Number of days: 0       Anesthesia Type:   Choice     Operative Indications:  Primary osteoarthritis of left knee [M17.12]    Operative Findings:  Depuy attune   Femur-8   Poly-7   Tibia-7   Patella-41    Complications:   None    Knee Technique: Suture (direct) Repair  Knee Approach: Medial Parapatellar    Chronic Narcotic Use:  No      Procedure and Technique:  Following the induction of adequate level of spinal anesthesia, a Waldron catheter was then sterilely introduced into this patient's bladder.  Antibiotics were ministered.  The left thigh was not fitted with a thigh-high tourniquet.  The left lower extremity then underwent sterile prep drape.  The left lower extremity was exsanguinated to gravity, and the tourniquet was inflated to 300 mmHg.  A midline knee incision was greater than the knee in flexion.  Full-thickness flaps were raised in order to access the extensor mechanism.  A medial arthrotomy was created to open up the knee joint.  2 half pins were placed within the proximal tibia, 2 half pins were placed within the distal femur.  In doing so, modules were created.  The arrays were then attached to the modules.  Checkpoints made the proximal tibia distal femur.  The knee was then registered.  The surgery was planned out on the computer, the plan was finalized.  The robot was docked.  The first maneuver involve the distal femoral cut followed by  anterior posterior cuts.  The chamfer cuts completed the process.  The proximal tibia cut was made next.  Care was taken preserve the integrity of the medial collateral, lateral collateral, posterior structures during these maneuvers.  The box cut was then made for the posterior stabilized unit, and remnant medial and lateral meniscectomies then performed.  Trials were inserted, the knee was taken through a range of motion, and found to be capable full extension, good flexion, stable throughout.  The patella was then resurfaced while utilizing manufactures equipment, it was found to be a size 41 mm button.  The trial components removed and the knee was prepared for insertion of cemented components.  The cemented tibia, cemented femur, trial poly-, and cemented patella then placed.  Excess cement was removed, the knee was brought to extension.  The cement was allowed to cure.  The trial poly was taken out, the knee was packed off.  The tourniquet is deflated, and hemostasis was secured.  The insert polyethylene was then snapped into position.  The knee was taken through a final range of motion, and found to be capable full extension, good flexion, stable throughout, and excellent patella tracking.  Satisfied with the extent of surgery, the wound was then flushed with saline and closed.  A Betadine soak initiated.  A drain is placed deep brought via separate lateral stab incision.  The arthrotomy was closed with number Vicryl suture.  The subcu tissues were closed with 2-0 Vicryl suture.  The skin was goes to staples.  Sterile dressings were applied.  He was then transferred to recovery room in stable condition with plans to include physical therapy for weightbearing to tolerance.  He will require DVT prophylaxis with Lovenox.  Please note, there is no qualified orthopedic resident was available assist, the assistance of Salvatore Gil PA-C was instrumental in the safe execution of this patient surgery.  Start the  patient position, patient prepped and draped, IntraOp assistance, wound closure, dressing application, patient transfers, all of these were performed under my direct supervision   I was present for the entire procedure.    Patient Disposition:  PACU             SIGNATURE: Sherman Pierce MD  DATE: January 20, 2025  TIME: 12:48 PM

## 2025-01-20 NOTE — ANESTHESIA PROCEDURE NOTES
Peripheral Block    Patient location during procedure: holding area  Start time: 1/20/2025 10:56 AM  Reason for block: at surgeon's request and post-op pain management  Staffing  Performed by: Hermelindo Harrison MD  Authorized by: Hermelindo Harrison MD    Preanesthetic Checklist  Completed: patient identified, IV checked, site marked, risks and benefits discussed, surgical consent, monitors and equipment checked, pre-op evaluation and timeout performed  Peripheral Block  Patient position: supine  Prep: ChloraPrep  Patient monitoring: continuous pulse ox and frequent blood pressure checks  Block type: adductor canal block  Laterality: left  Injection technique: single-shot  Procedures: ultrasound guided, Ultrasound guidance required for the procedure to increase accuracy and safety of medication placement and decrease risk of complications.  Ultrasound permanent image saved  bupivacaine (PF) (MARCAINE) 0.25 % injection 20 mL - Perineural   20 mL - 1/20/2025 10:56:00 AM  Needle  Needle type: Stimuplex   Needle gauge: 20 G  Needle length: 4 in  Needle localization: ultrasound guidance  Assessment  Injection assessment: incremental injection and local visualized surrounding nerve on ultrasound  Paresthesia pain: none  patient tolerated the procedure well with no immediate complications  Additional Notes  Uncomplicated adductor canal block (saphenous nerve, nerve to vastus medialis)  Superficial femoral artery identified  Dose: 20ml exparel + 10ml 0.25% bupivacaine    Supplemented with 10ml 0.25% bupivacaine for anterior femoral cutaneous nerve

## 2025-01-20 NOTE — PHYSICAL THERAPY NOTE
"        PT EVALUATION    Pt. Name: Sg Gomez  Pt. Age: 59 y.o.  MRN: [id number]  LENGTH OF STAY: 0    Patient Active Problem List   Diagnosis    Obstructive sleep apnea treated with continuous positive airway pressure (CPAP)    Obesity (BMI 35.0-39.9 without comorbidity)    Mixed hyperlipidemia    Gastroesophageal reflux disease    Hyperglycemia    Left knee pain    Chronic left shoulder pain    Primary osteoarthritis of left knee       Admitting Diagnoses:   Primary osteoarthritis of left knee [M17.12]    Past Medical History:   Diagnosis Date    CPAP (continuous positive airway pressure) dependence     GERD (gastroesophageal reflux disease)     High cholesterol     Knee pain, left     Obstructive sleep apnea     Last Assessed:6/8/16       Past Surgical History:   Procedure Laterality Date    COLONOSCOPY      KNEE SURGERY Bilateral     Bursa sacs removed bilaterally    SURGERY SCROTAL / TESTICULAR      1/9/25 per pt as a child had to have his testicles dropped \"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 \"       Imaging Studies:  No orders to display        01/20/25 1551   PT Last Visit   PT Visit Date 01/20/25   Note Type   Note type Evaluation   Pain Assessment   Pain Assessment Tool 0-10   Pain Score 4   Pain Location/Orientation Orientation: Left;Location: Knee   Hospital Pain Intervention(s) Cold applied;Repositioned;Ambulation/increased activity;Elevated;Emotional support;Rest   Restrictions/Precautions   Weight Bearing Precautions Per Order Yes   LLE Weight Bearing Per Order WBAT   Other Precautions Chair Alarm;Bed Alarm;WBS;Multiple lines;Fall Risk;Pain  (hemovac)   Home Living   Type of Home House  (ranch)   Home Layout One level;Performs ADLs on one level;Able to live on main level with bedroom/bathroom  (1+1 [place])   Bathroom Shower/Tub Tub/shower unit   Bathroom Toilet Standard   Bathroom Equipment Toilet raiser   Home Equipment Walker;Crutches   Prior Function   Level of [place] Independent with " ADLs;Independent with functional mobility;Independent with IADLS  (w/o AD)   Lives With Spouse   Receives Help From Family   IADLs Independent with driving;Independent with meal prep;Independent with medication management   Falls in the last 6 months 0   Vocational Full time employment   General   Family/Caregiver Present Yes   Cognition   Overall Cognitive Status WFL   Arousal/Participation Alert   Orientation Level Oriented X4   Following Commands Follows all commands and directions without difficulty   Subjective   Subjective Pt agreeable to PT/OT evaluations.   RUE Assessment   RUE Assessment   (refer to OT)   LUE Assessment   LUE Assessment   (refer to OT)   RLE Assessment   RLE Assessment WFL  (5/5 grossly)   LLE Assessment   LLE Assessment X   Strength LLE   L Knee Flexion 3/5   L Knee Extension 3/5   L Ankle Dorsiflexion 4+/5   L Ankle Plantar Flexion 4+/5   Light Touch   RLE Light Touch Grossly intact   LLE Light Touch Grossly intact   Bed Mobility   Supine to Sit 5  Supervision   Additional items HOB elevated;Bedrails;Increased time required;Verbal cues   Additional Comments Pt greeted in supine. /96. BP /99.   Transfers   Sit to Stand 4  Minimal assistance   Additional items Assist x 1;Bedrails;Increased time required;Verbal cues  (w/ RW)   Stand to Sit 5  Supervision   Additional items Armrests;Increased time required;Verbal cues  (w/ RW)   Additional Comments cues for hand placement   Ambulation/Elevation   Gait pattern Improper Weight shift;Antalgic;Decreased foot clearance;Decreased L stance;Short stride;Step to   Gait Assistance 5  Supervision   Additional items Verbal cues   Assistive Device Rolling walker   Distance 60'x1   Ambulation/Elevation Additional Comments improved gait speed with inc distance traveled   Balance   Static Sitting Good   Dynamic Sitting Fair +   Static Standing Fair  (w/ RW)   Dynamic Standing Fair -  (w/ RW)   Ambulatory Fair -  (w/ RW)   Activity Tolerance    Activity Tolerance Patient tolerated treatment well   Medical Staff Made Aware OT Bronwyn   Nurse Made Aware RN Tara   Assessment   Prognosis Good   Problem List Decreased strength;Decreased range of motion;Decreased endurance;Impaired balance;Decreased mobility;Pain   Assessment Pt. 59 y.o.male presents for elective surgery. Past medical hx includes SAADIA, HTN. Pt admitted for Primary osteoarthritis of left knee (M17.12). S/p L elective TKR POD #0. Pt referred to PT for functional mobility evaluation & D/C planning w/ orders of  ambulate with RW . WBAT LLE. PTA, pt reports being I w/o AD. Personal factors affecting pt at time of IE include: decreased independence in ADLs/IADLs and steps to enter. During evaluation, deficits included dec mobility, balance, ambulation. Required S for supine to sit, minAx1 for sit to stand, S for ambulation and stand to sit. Pt able to ambulate 60' with RW in unit. Antalgic step to gait with improved gait speed with inc distance traveled. Pt demonstrated dec endurance and tolerance to activity. Denies reports of dizziness or SOB t/o session. Pt was educated on fall precautions and reinforced w/ good understanding. Pt would benefit from continued PT to address deficits as defined above and maximize level of independence with functional mobility and safety. Based on pt presentation and impaired function, pt would benefit from level III, (minimum resource intensity) at D/C. The patient's AM-PAC Basic Mobility Inpatient Short Form Raw Score is 20. A Raw score of greater than 16 suggests the patient may benefit from discharge to home. Please also refer to the recommendation of the Physical Therapist for safe discharge planning. Nsg staff to continue to mobilized pt (OOB in chair for all meals & ambulate in room/unit) as tolerated to prevent further decline in function. Nsg notified. Co-eval performed to complete this PT evaluation for the pts best interest given pts medical complexity and  functional level.   Barriers to Discharge Inaccessible home environment   Barriers to Discharge Comments [place]   Goals   Patient Goals to walk   STG Expiration Date 01/27/25   Short Term Goal #1 1) Inc overall LE strength by 1/2 MMT grade to improve functional mobility; 2) Pt will demonstrate improved bed mobility with mod I to dec caregiver burden; 3) Pt will demonstrate improved transfers w/ mod I for inc safety; 4) Pt will be able to amb w/ mod I >150' w/ RW for household distances to inc safety and dec caregiver burden; 5) Pt will be able to navigate stairs with mod I to dec caregiver burden and inc safety with functional mobility; 6) Improve general balance by 1 grade to inc safety; 7) PT for ongoing patient and caregiver education   PT Treatment Day 0   Plan   Treatment/Interventions Functional transfer training;LE strengthening/ROM;Elevations;Therapeutic exercise;Endurance training;Patient/family training;Bed mobility;Gait training;Spoke to nursing;OT   PT Frequency Twice a day  (PRN)   Discharge Recommendation   Rehab Resource Intensity Level, PT III (Minimum Resource Intensity)   Equipment Recommended Walker  (pt owns)   AM-PAC Basic Mobility Inpatient   Turning in Flat Bed Without Bedrails 4   Lying on Back to Sitting on Edge of Flat Bed Without Bedrails 4   Moving Bed to Chair 3   Standing Up From Chair Using Arms 3   Walk in Room 3   Climb 3-5 Stairs With Railing 3   Basic Mobility Inpatient Raw Score 20   Basic Mobility Standardized Score 43.99   [facility] Highest Level Of Mobility   -HLM Goal 6: Walk 10 steps or more   -HLM Achieved 7: Walk 25 feet or more   End of Consult   Patient Position at End of Consult Bedside chair;Bed/Chair alarm activated;All needs within reach   End of Consult Comments BP in chair at end of session 154/98.       Hx/personal factors: co-morbidities, inaccessible home, mutliple lines, use of AD, pain, and fall risk, coping styles, social background, past experience,  behavior pattern  Examination: dec mobility, dec balance, dec endurance, dec amb, risk for falls, pain, assessed body system, balance, endurance, amb, D/C disposition & fall risk, impairements in locomotion, musculoskeletal, balance, endurance, posture, coordination, assessed cognition, impairments in systems including multiple body structures involved; musculoskeletal (ROM, strength, posture, BMI), neuromuscular (balance,locomotion, gait, transfers, motor control and learning, sensation), joint integrity, integumentary (skin integrity, presence of scars or wounds), cardiopulmonary (vitals, edema); activity limitations (difficulties executing an action); participation restrictions (problems associated w involvement in life situations)  Clinical: unpredictable (ongoing medical status, risk for falls, POD #0, and pain mgt)  Complexity: high      Zaria He, PT

## 2025-01-20 NOTE — ANESTHESIA POSTPROCEDURE EVALUATION
Post-Op Assessment Note    CV Status:  Stable    Pain management: satisfactory to patient       Mental Status:  Alert and awake   Hydration Status:  Stable   PONV Controlled:  None     Post Op Vitals Reviewed: Yes    No anethesia notable event occurred.    Staff: CRNA           Last Filed PACU Vitals:  Vitals Value Taken Time   Temp 97.9    Pulse 82 01/20/25 1303   /76 01/20/25 1303   Resp 16 01/20/25 1303   SpO2 98 % 01/20/25 1303   Vitals shown include unfiled device data.

## 2025-01-20 NOTE — ANESTHESIA PROCEDURE NOTES
Spinal Block    Patient location during procedure: OR  Start time: 1/20/2025 11:23 AM  Reason for block: primary anesthetic  Staffing  Performed by: Cipriano Song CRNA  Authorized by: Hermelindo Harrison MD    Preanesthetic Checklist  Completed: patient identified, IV checked, site marked, risks and benefits discussed, surgical consent, monitors and equipment checked, pre-op evaluation and timeout performed  Spinal Block  Patient position: sitting  Prep: ChloraPrep  Patient monitoring: heart rate, cardiac monitor, continuous pulse ox and frequent blood pressure checks  Approach: midline  Location: L3-4  Needle  Needle type: Pencil Point   Needle gauge: 24 G  Needle length: 4 in  Assessment  Sensory level: T4  Injection Assessment:  negative aspiration for heme, no paresthesia on injection and positive aspiration for clear CSF.  Post-procedure:  site cleaned  Additional Notes  Tolerated procedure well

## 2025-01-20 NOTE — H&P
H&P Exam - Orthopedics   Sg Gomez 59 y.o. male MRN: [id number]      Assessment/Plan   Assessment:  left Knee Osteoarthritis in this adult male who continues to have both pain and dysfunction despite appropriate nonsurgical treatments    Plan:  left  Total Knee Arthroplasty.  He understands risks, benefits, and alternatives    TOTAL KNEE REPLACEMENT INDICATIONS AND RISKS    We had a lengthy discussion with the patient regarding the potential options for treatment. The patient has had an extensive conservative management course up until this time and therefore I do not feel that any additional conservative management will provide additional relief. The patient's symptoms have progressively worsened to the point where they now limit the patient's daily activities and quality of life.     At this time, I feel that this patient would be an excellent candidate for a total knee replacement. The patient has failed non-operative care and continues to have unacceptable symptoms. We discussed the treatment options and alternatives and the risks and benefits of surgery in great detail.    While no guarantees can be made, total knee replacement has a very high success rate in terms of relieving a patient's knee pain and returning them to a more active, independent lifestyle for 10-15 years or more. All surgery carries some risk; for knee replacement, the complication rate is low but may include: death (very rare), infection, bleeding requiring transfusion, blood clots in legs traveling to lungs, nerve and/or blood vessel damage, bone fracture, persistent knee pain and/or stiffness, and repeat surgery(ies). The risk of a major complication is about 1-2 per 1000 cases. Total knee replacement should only be done if conservative treatment has failed. The revision rate for total knee replacements is about 1-2% per year; in other words, 85-95% of knee replacements may last 10 years; 75-85% last 20 years; and so on, assuming no  "injury to the knee. Finally we discussed anesthesia related complications which will be discussed in greater detail with the anesthesia team before surgery.     The patient was shown total knee booklets, diagrams and/or models and all of their questions have been answered at the present time. The patient may call or come in if they have any other concerns or questions. The patient also understands the post-operative rehabilitation process and the need for their cooperation and participation, and that their results may be compromised by their lack of compliance. The patient would like to proceed. Patient is encouraged to seek additional opinions if they so desire. The patient voiced their understanding of the surgical plan and potential complications and wishes to proceed with surgery.      History of Present Illness   HPI:  Sg Gomez is a 59 y.o. male who presents with pain in the left knee. Patient is no longer getting adequate relief from non-operative modalities. Patient is continuing to have debilitating pain from their knee, interfering with daily activities and sleep. Patient denies any concerns with infections, new neuropathies, or any acute injuries.     Historical Information  Review Of Systems:   Skin: Normal  Neuro: See HPI  Musculoskeletal: See HPI  14 point review of systems negative except as stated above     Past Medical History:   Past Medical History:   Diagnosis Date    CPAP (continuous positive airway pressure) dependence     GERD (gastroesophageal reflux disease)     High cholesterol     Knee pain, left     Obstructive sleep apnea     Last Assessed:6/8/16       Past Surgical History:   Past Surgical History:   Procedure Laterality Date    COLONOSCOPY      KNEE SURGERY Bilateral     Bursa sacs removed bilaterally    SURGERY SCROTAL / TESTICULAR      1/9/25 per pt as a child had to have his testicles dropped \"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 \"       Family History:  Family history reviewed and " non-contributory  Family History   Problem Relation Age of Onset    Hypertension Mother     Lung cancer Father     Anesthesia problems Son        Social History:  Social History     Socioeconomic History    Marital status: /Civil Union     Spouse name: None    Number of children: None    Years of education: None    Highest education level: None   Occupational History    None   Tobacco Use    Smoking status: Never    Smokeless tobacco: Never    Tobacco comments:     No smoking per pt - denies any tobacco use per pt    Vaping Use    Vaping status: Never Used   Substance and Sexual Activity    Alcohol use: Yes     Comment: Beer use - weekly    Drug use: No     Comment: Denies any drug use per pt    Sexual activity: Yes     Comment: Denies any chest pain or shortness of breath with activity   Other Topics Concern    None   Social History Narrative    None     Social Drivers of Health     Financial Resource Strain: Not on file   Food Insecurity: Not on file   Transportation Needs: Not on file   Physical Activity: Not on file   Stress: Not on file   Social Connections: Not on file   Intimate Partner Violence: Not on file   Housing Stability: Not on file       Allergies:   No Known Allergies        Labs:  0   Lab Value Date/Time    HCT 45.8 12/27/2024 0841    HCT 44.7 08/20/2024 0919    HCT 43.7 07/31/2023 0913    HGB 14.6 12/27/2024 0841    HGB 14.7 08/20/2024 0919    HGB 14.7 07/31/2023 0913    INR 0.93 12/27/2024 0841    WBC 5.11 12/27/2024 0841    WBC 4.86 08/20/2024 0919    WBC 5.21 07/31/2023 0913       Meds:    Current Facility-Administered Medications:     bupivacaine liposomal (EXPAREL) 1.3 % injection 20 mL, 20 mL, Infiltration, Once, Hermelindo Harrison MD    ceFAZolin (ANCEF) IVPB (premix in dextrose) 2,000 mg 50 mL, 2,000 mg, Intravenous, Once, Kallie Lindsey PA-C    lactated ringers infusion, 125 mL/hr, Intravenous, Continuous, Kallie Lindsey PA-C, Last Rate: 125 mL/hr at 01/20/25 0959, 125 mL/hr at  "01/20/25 0959    tranexamic acid (CYKLOKAPRON) 1000-0.7 MG/100ML-% injection 1,000 mg, 1,000 mg, Intravenous, Once, Kallie Lindsey PA-C    Blood Culture:   No results found for: \"BLOODCX\"    Wound Culture:   No results found for: \"WOUNDCULT\"    Ins and Outs:  No intake/output data recorded.          Physical Exam  /91   Pulse 74   Temp 98.4 °F (36.9 °C) (Temporal)   Resp 16   Ht 6' 1\" (1.854 m)   Wt 117 kg (259 lb)   SpO2 93%   BMI 34.17 kg/m²   /91   Pulse 74   Temp 98.4 °F (36.9 °C) (Temporal)   Resp 16   Ht 6' 1\" (1.854 m)   Wt 117 kg (259 lb)   SpO2 93%   BMI 34.17 kg/m²   Gen: No acute distress, resting comfortably in bed  HEENT: Eyes clear, moist mucus membranes, hearing intact  Respiratory: No audible wheezing or stridor  Cardiovascular: Well Perfused peripherally, 2+ distal pulse  Abdomen: nondistended, no peritoneal signs  Ortho Exam: limited ROM due to pain and mechanical blocking  Neuro Exam: intact    Lab Results: Reviewed  Imaging: Reviewed   "

## 2025-01-20 NOTE — ANESTHESIA PROCEDURE NOTES
Peripheral Block    Patient location during procedure: pre-op  Start time: 1/20/2025 10:56 AM  Reason for block: at surgeon's request and post-op pain management  Staffing  Performed by: Hermelindo Harrison MD  Authorized by: Hermelindo Harrison MD    Preanesthetic Checklist  Completed: patient identified, IV checked, site marked, risks and benefits discussed, surgical consent, monitors and equipment checked, pre-op evaluation and timeout performed  Peripheral Block  Patient position: supine  Prep: ChloraPrep  Patient monitoring: heart rate, continuous pulse ox and frequent blood pressure checks  Block type: ipack block  Laterality: left  Injection technique: single-shot  Procedures: ultrasound guided, Ultrasound guidance required for the procedure to increase accuracy and safety of medication placement and decrease risk of complications.  Ultrasound permanent image saved  bupivacaine (PF) (MARCAINE) 0.25 % injection 20 mL - Perineural   20 mL - 1/20/2025 10:56:00 AM  Needle  Needle type: Stimuplex   Needle gauge: 20 G  Needle length: 4 in  Needle localization: ultrasound guidance  Assessment  Injection assessment: incremental injection, local visualized surrounding nerve on ultrasound, negative aspiration for heme and no paresthesia on injection  patient tolerated the procedure well with no immediate complications  Additional Notes  Uncomplicated iPACK block  LA deposited above shaft of femur just proximal to femoral condyles  Dose: 20ml 0.25% bupivacaine

## 2025-01-21 VITALS
SYSTOLIC BLOOD PRESSURE: 110 MMHG | HEIGHT: 73 IN | BODY MASS INDEX: 34.33 KG/M2 | HEART RATE: 68 BPM | DIASTOLIC BLOOD PRESSURE: 71 MMHG | WEIGHT: 259 LBS | RESPIRATION RATE: 18 BRPM | TEMPERATURE: 97.7 F | OXYGEN SATURATION: 97 %

## 2025-01-21 LAB
ALBUMIN SERPL BCG-MCNC: 3.7 G/DL (ref 3.5–5)
ALP SERPL-CCNC: 57 U/L (ref 34–104)
ALT SERPL W P-5'-P-CCNC: 16 U/L (ref 7–52)
ANION GAP SERPL CALCULATED.3IONS-SCNC: 7 MMOL/L (ref 4–13)
AST SERPL W P-5'-P-CCNC: 14 U/L (ref 13–39)
BILIRUB SERPL-MCNC: 0.77 MG/DL (ref 0.2–1)
BUN SERPL-MCNC: 14 MG/DL (ref 5–25)
CALCIUM SERPL-MCNC: 8.9 MG/DL (ref 8.4–10.2)
CHLORIDE SERPL-SCNC: 105 MMOL/L (ref 96–108)
CO2 SERPL-SCNC: 28 MMOL/L (ref 21–32)
CREAT SERPL-MCNC: 0.81 MG/DL (ref 0.6–1.3)
ERYTHROCYTE [DISTWIDTH] IN BLOOD BY AUTOMATED COUNT: 11.9 % (ref 11.6–15.1)
GFR SERPL CREATININE-BSD FRML MDRD: 97 ML/MIN/1.73SQ M
GLUCOSE P FAST SERPL-MCNC: 120 MG/DL (ref 65–99)
GLUCOSE SERPL-MCNC: 120 MG/DL (ref 65–140)
HCT VFR BLD AUTO: 37 % (ref 36.5–49.3)
HGB BLD-MCNC: 12.6 G/DL (ref 12–17)
MCH RBC QN AUTO: 30.9 PG (ref 26.8–34.3)
MCHC RBC AUTO-ENTMCNC: 34.1 G/DL (ref 31.4–37.4)
MCV RBC AUTO: 91 FL (ref 82–98)
PLATELET # BLD AUTO: 179 THOUSANDS/UL (ref 149–390)
PMV BLD AUTO: 9.1 FL (ref 8.9–12.7)
POTASSIUM SERPL-SCNC: 4.5 MMOL/L (ref 3.5–5.3)
PROT SERPL-MCNC: 6.4 G/DL (ref 6.4–8.4)
RBC # BLD AUTO: 4.08 MILLION/UL (ref 3.88–5.62)
SODIUM SERPL-SCNC: 140 MMOL/L (ref 135–147)
WBC # BLD AUTO: 11.06 THOUSAND/UL (ref 4.31–10.16)

## 2025-01-21 PROCEDURE — 97530 THERAPEUTIC ACTIVITIES: CPT | Performed by: PHYSICAL THERAPIST

## 2025-01-21 PROCEDURE — 99024 POSTOP FOLLOW-UP VISIT: CPT | Performed by: PHYSICIAN ASSISTANT

## 2025-01-21 PROCEDURE — 97116 GAIT TRAINING THERAPY: CPT | Performed by: PHYSICAL THERAPIST

## 2025-01-21 PROCEDURE — 85027 COMPLETE CBC AUTOMATED: CPT

## 2025-01-21 PROCEDURE — 99232 SBSQ HOSP IP/OBS MODERATE 35: CPT | Performed by: INTERNAL MEDICINE

## 2025-01-21 PROCEDURE — 80053 COMPREHEN METABOLIC PANEL: CPT

## 2025-01-21 RX ORDER — PSEUDOEPHED/ACETAMINOPH/DIPHEN 30MG-500MG
TABLET ORAL
Qty: 90 TABLET | Refills: 0 | Status: SHIPPED | OUTPATIENT
Start: 2025-01-21

## 2025-01-21 RX ADMIN — GABAPENTIN 100 MG: 100 CAPSULE ORAL at 06:08

## 2025-01-21 RX ADMIN — METHOCARBAMOL TABLETS 500 MG: 500 TABLET, COATED ORAL at 06:08

## 2025-01-21 RX ADMIN — PANTOPRAZOLE SODIUM 40 MG: 40 TABLET, DELAYED RELEASE ORAL at 06:08

## 2025-01-21 RX ADMIN — OXYCODONE 5 MG: 5 TABLET ORAL at 08:40

## 2025-01-21 RX ADMIN — ACETAMINOPHEN 325MG 975 MG: 325 TABLET ORAL at 06:31

## 2025-01-21 RX ADMIN — DOCUSATE SODIUM 100 MG: 100 CAPSULE, LIQUID FILLED ORAL at 08:40

## 2025-01-21 RX ADMIN — CEFAZOLIN SODIUM 1000 MG: 1 SOLUTION INTRAVENOUS at 03:40

## 2025-01-21 RX ADMIN — ENOXAPARIN SODIUM 40 MG: 40 INJECTION SUBCUTANEOUS at 08:40

## 2025-01-21 NOTE — PLAN OF CARE
Problem: PAIN - ADULT  Goal: Verbalizes/displays adequate comfort level or baseline comfort level  Description: Interventions:  - Encourage patient to monitor pain and request assistance  - Assess pain using appropriate pain scale  - Administer analgesics based on type and severity of pain and evaluate response  - Implement non-pharmacological measures as appropriate and evaluate response  - Consider cultural and social influences on pain and pain management  - Notify physician/advanced practitioner if interventions unsuccessful or patient reports new pain  Outcome: Progressing     Problem: INFECTION - ADULT  Goal: Absence or prevention of progression during hospitalization  Description: INTERVENTIONS:  - Assess and monitor for signs and symptoms of infection  - Monitor lab/diagnostic results  - Monitor all insertion sites, i.e. indwelling lines, tubes, and drains  - Monitor endotracheal if appropriate and nasal secretions for changes in amount and color  - [place] appropriate cooling/warming therapies per order  - Administer medications as ordered  - Instruct and encourage patient and family to use good hand hygiene technique  - Identify and instruct in appropriate isolation precautions for identified infection/condition  Outcome: Progressing  Goal: Absence of fever/infection during neutropenic period  Description: INTERVENTIONS:  - Monitor WBC    Outcome: Progressing     Problem: SAFETY ADULT  Goal: Patient will remain free of falls  Description: INTERVENTIONS:  - Educate patient/family on patient safety including physical limitations  - Instruct patient to call for assistance with activity   - Consult OT/PT to assist with strengthening/mobility   - Keep Call bell within reach  - Keep bed low and locked with side rails adjusted as appropriate  - Keep care items and personal belongings within reach  - Initiate and maintain comfort rounds  - Make Fall Risk Sign visible to staff  - Offer Toileting every  Hours,  in advance of need  - Initiate/Maintain alarm  - Obtain necessary fall risk management equipment:   - Apply yellow socks and bracelet for high fall risk patients  - Consider moving patient to room near nurses station  Outcome: Progressing  Goal: Maintain or return to baseline ADL function  Description: INTERVENTIONS:  -  Assess patient's ability to carry out ADLs; assess patient's baseline for ADL function and identify physical deficits which impact ability to perform ADLs (bathing, care of mouth/teeth, toileting, grooming, dressing, etc.)  - Assess/evaluate cause of self-care deficits   - Assess range of motion  - Assess patient's mobility; develop plan if impaired  - Assess patient's need for assistive devices and provide as appropriate  - Encourage maximum independence but intervene and supervise when necessary  - Involve family in performance of ADLs  - Assess for home care needs following discharge   - Consider OT consult to assist with ADL evaluation and planning for discharge  - Provide patient education as appropriate  Outcome: Progressing  Goal: Maintains/Returns to pre admission functional level  Description: INTERVENTIONS:  - Perform AM-PAC 6 Click Basic Mobility/ Daily Activity assessment daily.  - Set and communicate daily mobility goal to care team and patient/family/caregiver.   - Collaborate with rehabilitation services on mobility goals if consulted  - Perform Range of Motion times a day.  - Reposition patient every  hours.  - Dangle patient  times a day  - Stand patient  times a day  - Ambulate patient  times a day  - Out of bed to chair times a day   - Out of bed for meals  times a day  - Out of bed for toileting  - Record patient progress and toleration of activity level   Outcome: Progressing     Problem: DISCHARGE PLANNING  Goal: Discharge to home or other facility with appropriate resources  Description: INTERVENTIONS:  - Identify barriers to discharge w/patient and caregiver  - Arrange for  needed discharge resources and transportation as appropriate  - Identify discharge learning needs (meds, wound care, etc.)  - Arrange for interpretive services to assist at discharge as needed  - Refer to Case Management Department for coordinating discharge planning if the patient needs post-hospital services based on physician/advanced practitioner order or complex needs related to functional status, cognitive ability, or social support system  Outcome: Progressing     Problem: Knowledge Deficit  Goal: Patient/family/caregiver demonstrates understanding of disease process, treatment plan, medications, and discharge instructions  Description: Complete learning assessment and assess knowledge base.  Interventions:  - Provide teaching at level of understanding  - Provide teaching via preferred learning methods  Outcome: Progressing

## 2025-01-21 NOTE — PROGRESS NOTES
Progress Note - Hospitalist   Name: Sg Gomez 59 y.o. male I MRN: [id number]  Unit/Bed#: WE 2 N -01 I Date of Admission: 1/20/2025   Date of Service: 1/21/2025 I Hospital Day: 0     Assessment & Plan  Primary osteoarthritis of left knee  Postoperative day 1 status post left robotic TKA, AVSS, labs pending, pain well-controlled, ambulating independently with use of walker, voiding appropriately, 425 mL output from Hemovac sanguinous    POD #1 s/p left robotic TKA-   - Antibiotics: 1 g IV for 2 doses  - Anticoagulation: Lovenox 40 units starting tonight for 28 days, SCDs, ambulation  - Activity: Weightbearing as tolerated, PT/OT    - AM lab draw: Labs pending  - Analgesia: Continue p.r.n. medication  - Dressing: keep clean, dry, and in tact, CHRIS drain to be removed tomorrow  - Disposition: Hopeful to be discharged tomorrow    Obstructive sleep apnea treated with continuous positive airway pressure (CPAP)  Chronic, per baseline,    -Start home CPAP per home settings and patient recommendations  Gastroesophageal reflux disease  Chronic, stable    -Continue omeprazole  Mixed hyperlipidemia  Chronic, stable    -Continue atorvastatin    Hospitalist service will follow.    Subjective   Patient was seen and examined at bedside.  Patient is currently getting up to ambulate over towards the bathroom.  Patient reports that he has been voiding appropriately.  Patient denies any numbness, tingling, lack of motor movement lack sensation to the distal lower extremities.  Patient feels quite good and stable on his feet.  Patient has been ambulating routinely.  Patient was well rested from last night.  Patient has no concerns or questions.    Objective :  Temp:  [97.7 °F (36.5 °C)-99 °F (37.2 °C)] 97.7 °F (36.5 °C)  HR:  [] 66  BP: (109-154)/(71-99) 123/72  Resp:  [16-18] 18  SpO2:  [93 %-98 %] 95 %  O2 Device: None (Room air)    I/O         01/19 0701  01/20 0700 01/20 0701  01/21 0700    I.V. (mL/kg)  1200 (10.3)     "IV Piggyback  150    Total Intake(mL/kg)  1350 (11.5)    Urine (mL/kg/hr)  900    Drains  425    Total Output  1325    Net  +25                Lines/Drains/Airways       Active Status       Name Placement date Placement time Site Days    Closed/Suction Drain Left Knee Accordion 10 Fr. 01/20/25  1239  Knee  less than 1                  Physical Exam  /72   Pulse 66   Temp 97.7 °F (36.5 °C)   Resp 18   Ht 6' 1\" (1.854 m)   Wt 117 kg (259 lb)   SpO2 95%   BMI 34.17 kg/m²     General Appearance: No acute distress  Heart: Regular rate  Lungs: Normal respiratory effort  Left Lower Extremity: . + L3-S1 SILT. + DF/PF.+ EHL. No pain with passive stretch/extension of toes.  and foot is warm B/L. Incision is c/d/i      Lab Results: I have reviewed the following results:  Recent Labs     01/21/25  0603   WBC 11.06*   HGB 12.6   HCT 37.0      SODIUM 140   K 4.5      CO2 28   BUN 14   CREATININE 0.81   GLUC 120   AST 14   ALT 16   ALB 3.7   TBILI 0.77   ALKPHOS 57       Imaging Results Review: No pertinent imaging studies reviewed.  Other Study Results Review: No additional pertinent studies reviewed.    VTE Pharmacologic Prophylaxis: Enoxaparin (Lovenox)  VTE Mechanical Prophylaxis: sequential compression device  "

## 2025-01-21 NOTE — ANESTHESIA POSTPROCEDURE EVALUATION
Post-Op Assessment Note    CV Status:  Stable    Pain management: adequate       Mental Status:  Awake   Hydration Status:  Euvolemic   PONV Controlled:  Controlled   Airway Patency:  Patent     Post Op Vitals Reviewed: Yes    No anethesia notable event occurred.    Staff: Anesthesiologist           Last Filed PACU Vitals:  Vitals Value Taken Time   Temp 97.9 °F (36.6 °C) 01/20/25 1332   Pulse 65 01/20/25 1339   /79 01/20/25 1332   Resp 20 01/20/25 1339   SpO2 93 % 01/20/25 1339   Vitals shown include unfiled device data.    Modified Francheska:     Vitals Value Taken Time   Activity 2 01/20/25 1332   Respiration 2 01/20/25 1332   Circulation 2 01/20/25 1332   Consciousness 2 01/20/25 1332   Oxygen Saturation 2 01/20/25 1332     Modified Francheska Score: 10

## 2025-01-21 NOTE — PLAN OF CARE
Problem: PAIN - ADULT  Goal: Verbalizes/displays adequate comfort level or baseline comfort level  Description: Interventions:  - Encourage patient to monitor pain and request assistance  - Assess pain using appropriate pain scale  - Administer analgesics based on type and severity of pain and evaluate response  - Implement non-pharmacological measures as appropriate and evaluate response  - Consider cultural and social influences on pain and pain management  - Notify physician/advanced practitioner if interventions unsuccessful or patient reports new pain  Outcome: Adequate for Discharge     Problem: INFECTION - ADULT  Goal: Absence or prevention of progression during hospitalization  Description: INTERVENTIONS:  - Assess and monitor for signs and symptoms of infection  - Monitor lab/diagnostic results  - Monitor all insertion sites, i.e. indwelling lines, tubes, and drains  - Monitor endotracheal if appropriate and nasal secretions for changes in amount and color  - [place] appropriate cooling/warming therapies per order  - Administer medications as ordered  - Instruct and encourage patient and family to use good hand hygiene technique  - Identify and instruct in appropriate isolation precautions for identified infection/condition  Outcome: Adequate for Discharge  Goal: Absence of fever/infection during neutropenic period  Description: INTERVENTIONS:  - Monitor WBC    Outcome: Adequate for Discharge     Problem: SAFETY ADULT  Goal: Patient will remain free of falls  Description: INTERVENTIONS:  - Educate patient/family on patient safety including physical limitations  - Instruct patient to call for assistance with activity   - Consult OT/PT to assist with strengthening/mobility   - Keep Call bell within reach  - Keep bed low and locked with side rails adjusted as appropriate  - Keep care items and personal belongings within reach  - Initiate and maintain comfort rounds  - Make Fall Risk Sign visible to  staff  - Offer Toileting every 2 Hours, in advance of need  - Initiate/Maintain chairalarm  - Obtain necessary fall risk management equipment:   - Apply yellow socks and bracelet for high fall risk patients  - Consider moving patient to room near nurses station  Outcome: Adequate for Discharge  Goal: Maintain or return to baseline ADL function  Description: INTERVENTIONS:  -  Assess patient's ability to carry out ADLs; assess patient's baseline for ADL function and identify physical deficits which impact ability to perform ADLs (bathing, care of mouth/teeth, toileting, grooming, dressing, etc.)  - Assess/evaluate cause of self-care deficits   - Assess range of motion  - Assess patient's mobility; develop plan if impaired  - Assess patient's need for assistive devices and provide as appropriate  - Encourage maximum independence but intervene and supervise when necessary  - Involve family in performance of ADLs  - Assess for home care needs following discharge   - Consider OT consult to assist with ADL evaluation and planning for discharge  - Provide patient education as appropriate  Outcome: Adequate for Discharge  Goal: Maintains/Returns to pre admission functional level  Description: INTERVENTIONS:  - Perform AM-PAC 6 Click Basic Mobility/ Daily Activity assessment daily.  - Set and communicate daily mobility goal to care team and patient/family/caregiver.   - Collaborate with rehabilitation services on mobility goals if consulted  - Perform Range of Motion 3 times a day.  - Reposition patient every 2 hours.  - Dangle patient 3 times a day  - Stand patient 3 times a day  - Ambulate patient 3   times a day  - Out of bed to chair 3 times a day   - Out of bed for meals 3 times a day  - Out of bed for toileting  - Record patient progress and toleration of activity level   Outcome: Adequate for Discharge     Problem: DISCHARGE PLANNING  Goal: Discharge to home or other facility with appropriate resources  Description:  INTERVENTIONS:  - Identify barriers to discharge w/patient and caregiver  - Arrange for needed discharge resources and transportation as appropriate  - Identify discharge learning needs (meds, wound care, etc.)  - Arrange for interpretive services to assist at discharge as needed  - Refer to Case Management Department for coordinating discharge planning if the patient needs post-hospital services based on physician/advanced practitioner order or complex needs related to functional status, cognitive ability, or social support system  Outcome: Adequate for Discharge     Problem: Knowledge Deficit  Goal: Patient/family/caregiver demonstrates understanding of disease process, treatment plan, medications, and discharge instructions  Description: Complete learning assessment and assess knowledge base.  Interventions:  - Provide teaching at level of understanding  - Provide teaching via preferred learning methods  Outcome: Adequate for Discharge

## 2025-01-21 NOTE — PHYSICAL THERAPY NOTE
PT PROGRESS NOTE    Name: Sg Gomez  AGE: 59 y.o.  MRN: [id number]  LENGTH OF STAY: 0     01/21/25 1025   PT Last Visit   PT Visit Date 01/21/25   Note Type   Note Type Treatment   Pain Assessment   Pain Assessment Tool 0-10   Pain Score 2   Pain Location/Orientation Orientation: Left;Location: Knee   Hospital Pain Intervention(s) Cold applied;Repositioned;Ambulation/increased activity;Elevated;Emotional support;Rest   Restrictions/Precautions   Weight Bearing Precautions Per Order Yes   LLE Weight Bearing Per Order WBAT   Other Precautions Chair Alarm;Fall Risk;Pain   General   Chart Reviewed Yes   Response to Previous Treatment Patient with no complaints from previous session.   Family/Caregiver Present No   Cognition   Overall Cognitive Status WFL   Arousal/Participation Alert;Cooperative   Attention Within functional limits   Orientation Level Oriented X4   Following Commands Follows all commands and directions without difficulty   Subjective   Subjective I am ready to go.   Bed Mobility   Additional Comments Pt greeted OOB in chair.   Transfers   Sit to Stand 5  Supervision   Additional items Armrests;Increased time required;Verbal cues  (w/ RW)   Stand to Sit 5  Supervision   Additional items Armrests;Increased time required;Verbal cues  (w/ RW)   Toilet transfer 5  Supervision   Additional items Increased time required;Verbal cues;Standard toilet  (w/ grab bars and RW)   Additional Comments assisted with lower and upper body dressing in bedside chair.   Ambulation/Elevation   Gait pattern Improper Weight shift;Antalgic;Decreased foot clearance;Decreased L stance;Step through pattern   Gait Assistance 5  Supervision   Additional items Verbal cues   Assistive Device Rolling walker   Distance 110'x2   Stair Management Assistance 5  Supervision   Additional items Verbal cues   Stair Management Technique No rails;Step to pattern;Foreward;Nonreciprocal;With walker   Number of Stairs 2  (8 in landing steps)    Balance   Static Sitting Normal   Dynamic Sitting Good   Static Standing Fair +  (w/ RW)   Dynamic Standing Fair  (w/ RW)   Ambulatory Fair  (w/ RW)   Activity Tolerance   Activity Tolerance Patient tolerated treatment well   Nurse Made Aware RN Tara   Exercises   TKR   (Reviewed HEP and provided handout. All questions and concerns addressed at this time.)   Assessment   Prognosis Good   Problem List Decreased strength;Decreased range of motion;Decreased endurance;Impaired balance;Decreased mobility;Pain   Assessment Patient was seen today per POC. Overall, pt demonstrated improved mobility and inc tolerance to activity. Pain 2/10 in L knee. Required S for sit<>stand, toilet transfers, ambulation, and stair navigation. Use of standard toilet. Provided assistance for lower and upper body dressing in bedside chair. Pt able to ambulate 110'x2 with RW and S in unit. Antalgic step through gait with no gross LOB noted. Able to perform nonreciprocal stair navigation with use of RW on landing steps to simulate home setup. Reviewed HEP and provided handout. All questions and concerns addressed at this time. No reports of dizziness t/o session. Will continue to see pt per POC as tolerated. From PT standpoint continued recommendation for level III, (minimum resource intensity) at D/C when medically cleared based current function. The patient's AM-PAC Basic Mobility Inpatient Short Form Raw Score is 23. A Raw score of greater than 16 suggests the patient may benefit from discharge to post-acute rehabilitation services. Please also refer to the recommendation of the Physical Therapist for safe discharge planning. Nsg staff to continue to mobilized pt (OOB in chair for all meals & ambulate in room/unit) as tolerated to prevent further decline in function. Nsg staff notified. From PT stand point, pt cleared functionally for D/C when medically appropriate.   Barriers to Discharge None   Barriers to Discharge Comments From PT  stand point, pt cleared functionally for D/C when medically appropriate.   Goals   Patient Goals to go home   STG Expiration Date 01/27/25   PT Treatment Day 1   Plan   Treatment/Interventions Functional transfer training;LE strengthening/ROM;Elevations;Therapeutic exercise;Endurance training;Patient/family training;Bed mobility;Gait training;Spoke to nursing;OT   Progress Progressing toward goals   PT Frequency 5-7x/wk   Discharge Recommendation   Rehab Resource Intensity Level, PT III (Minimum Resource Intensity)   AM-PAC Basic Mobility Inpatient   Turning in Flat Bed Without Bedrails 4   Lying on Back to Sitting on Edge of Flat Bed Without Bedrails 4   Moving Bed to Chair 4   Standing Up From Chair Using Arms 4   Walk in Room 4   Climb 3-5 Stairs With Railing 3   Basic Mobility Inpatient Raw Score 23   Basic Mobility Standardized Score 50.88   [facility] Highest Level Of Mobility   JH-HLM Goal 7: Walk 25 feet or more   JH-HLM Achieved 7: Walk 25 feet or more   Education   Education Provided Mobility training;Home exercise program;Assistive device   Patient Demonstrates acceptance/verbal understanding   End of Consult   Patient Position at End of Consult Bedside chair;Bed/Chair alarm activated;All needs within reach       Zaria He, PT

## 2025-01-21 NOTE — ASSESSMENT & PLAN NOTE
Postoperative day 0 status post left robotic TKA, AVSS, appropriate urinary output, sanguinous drainage from CHRIS of 200 cc, ending labs for a.m., pain well-controlled, neurovascular status intact ambulating with assistance    POD #0 s/p left robotic TKA-   - Antibiotics: 1 g IV for 2 doses  - Anticoagulation: Lovenox 40 units starting tonight for 28 days, SCDs, ambulation  - Activity: Weightbearing as tolerated, PT/OT    - AM lab draw: CBC, BMP  - Analgesia: Continue p.r.n. medication  - Dressing: keep clean, dry, and in tact, CHRIS drain to be removed tomorrow  - Disposition: Hopeful to be discharged tomorrow

## 2025-01-21 NOTE — ASSESSMENT & PLAN NOTE
Postoperative day 1 status post left robotic TKA, AVSS, labs pending, pain well-controlled, ambulating independently with use of walker, voiding appropriately, 425 mL output from Hemovac sanguinous    POD #1 s/p left robotic TKA-   - Antibiotics: 1 g IV for 2 doses  - Anticoagulation: Lovenox 40 units starting tonight for 28 days, SCDs, ambulation  - Activity: Weightbearing as tolerated, PT/OT    - AM lab draw: Labs pending  - Analgesia: Continue p.r.n. medication  - Dressing: keep clean, dry, and in tact, CHRIS drain to be removed tomorrow  - Disposition: Hopeful to be discharged tomorrow

## 2025-01-21 NOTE — PROGRESS NOTES
"Progress Note - Orthopedics   Name: Sg Gomez 59 y.o. male I MRN: [id number]  Unit/Bed#: WE 2 N -01 I Date of Admission: 1/20/2025   Date of Service: 1/21/2025 I Hospital Day: 0    Assessment & Plan  Primary osteoarthritis of left knee  POD 1 s/p left total knee arthroplasty  WBAT left lower extremity  PT/OT  DVT prophylaxis with Lovenox  Analgesics for pain  Hemovac drain was removed today without complication, for 25 cc total output since surgery  Obstructive sleep apnea treated with continuous positive airway pressure (CPAP)    Mixed hyperlipidemia    Gastroesophageal reflux disease          Subjective   59 y.o.male  No acute events, no new complaints. Pain well controlled. Denies fevers, chills, CP, SOB, N/V, numbness or tingling. Patient reports no issues with urination or bowel movements. Patient states he has minimal discomfort at this time    Objective :  Temp:  [97.7 °F (36.5 °C)-99 °F (37.2 °C)] 97.7 °F (36.5 °C)  HR:  [] 66  BP: (109-154)/(71-99) 123/72  Resp:  [16-18] 18  SpO2:  [93 %-98 %] 95 %  O2 Device: None (Room air)    Physical Exam  Musculoskeletal: leftlower  Skin clean, dry and intact. No erythema or ecchymosis.  Dressing clean, dry and intact  TTP over the anterior aspect of the knee  Hemovac drain was removed today without complication, for 25 cc total output since surgery  Motor intact to +FHL/EHL, +ankle dorsi/plantar flexion  Sensation intact to saphenous, sural, tibial, superficial peroneal nerve, and deep peroneal  2+ DP pulse  No calf swelling or tenderness to palpation      Lab Results: I have reviewed the following results:  Recent Labs     01/21/25  0603   WBC 11.06*   HGB 12.6   HCT 37.0      BUN 14   CREATININE 0.81     Blood Culture:  No results found for: \"BLOODCX\"  Wound Culture: No results found for: \"WOUNDCULT\"  "

## 2025-01-21 NOTE — ASSESSMENT & PLAN NOTE
POD 1 s/p left total knee arthroplasty  WBAT left lower extremity  PT/OT  DVT prophylaxis with Lovenox  Analgesics for pain  Hemovac drain was removed today without complication, for 25 cc total output since surgery

## 2025-01-21 NOTE — PLAN OF CARE
Problem: PHYSICAL THERAPY ADULT  Goal: Performs mobility at highest level of function for planned discharge setting.  See evaluation for individualized goals.  Description: Treatment/Interventions: Functional transfer training, LE strengthening/ROM, Elevations, Therapeutic exercise, Endurance training, Patient/family training, Bed mobility, Gait training, Spoke to nursing, OT  Equipment Recommended: Walker (pt owns)       See flowsheet documentation for full assessment, interventions and recommendations.  1/21/2025 1410 by Zaria He PT  Outcome: Adequate for Discharge  Note: Prognosis: Good  Problem List: Decreased strength, Decreased range of motion, Decreased endurance, Impaired balance, Decreased mobility, Pain  Assessment: Patient was seen today per POC. Overall, pt demonstrated improved mobility and inc tolerance to activity. Pain 2/10 in L knee. Required S for sit<>stand, toilet transfers, ambulation, and stair navigation. Use of standard toilet. Provided assistance for lower and upper body dressing in bedside chair. Pt able to ambulate 110'x2 with RW and S in unit. Antalgic step through gait with no gross LOB noted. Able to perform nonreciprocal stair navigation with use of RW on landing steps to simulate home setup. Reviewed HEP and provided handout. All questions and concerns addressed at this time. No reports of dizziness t/o session. Will continue to see pt per POC as tolerated. From PT standpoint continued recommendation for level III, (minimum resource intensity) at D/C when medically cleared based current function. The patient's AM-PAC Basic Mobility Inpatient Short Form Raw Score is 23. A Raw score of greater than 16 suggests the patient may benefit from discharge to post-acute rehabilitation services. Please also refer to the recommendation of the Physical Therapist for safe discharge planning. [facility] staff to continue to mobilized pt (OOB in chair for all meals & ambulate in room/unit) as  tolerated to prevent further decline in function. Nsg staff notified. From PT stand point, pt cleared functionally for D/C when medically appropriate.  Barriers to Discharge: None  Barriers to Discharge Comments: From PT stand point, pt cleared functionally for D/C when medically appropriate.  Rehab Resource Intensity Level, PT: III (Minimum Resource Intensity)    See flowsheet documentation for full assessment.

## 2025-01-21 NOTE — CONSULTS
Consultation - Hospitalist   Name: Sg Gomez 59 y.o. male I MRN: [id number]  Unit/Bed#: WE 2 N -01 I Date of Admission: 1/20/2025   Date of Service: 1/20/2025 I Hospital Day: 0   Inpatient consult to Internal Medicine  Consult performed by: Wesley Currie PA-C  Consult ordered by: Kallie Lindsey PA-C        Physician Requesting Evaluation: Sherman Pierce MD   Reason for Evaluation / Principal Problem:     Assessment & Plan  Primary osteoarthritis of left knee  Postoperative day 0 status post left robotic TKA, AVSS, appropriate urinary output, sanguinous drainage from CHRIS of 200 cc, ending labs for a.m., pain well-controlled, neurovascular status intact ambulating with assistance    POD #0 s/p left robotic TKA-   - Antibiotics: 1 g IV for 2 doses  - Anticoagulation: Lovenox 40 units starting tonight for 28 days, SCDs, ambulation  - Activity: Weightbearing as tolerated, PT/OT    - AM lab draw: CBC, BMP  - Analgesia: Continue p.r.n. medication  - Dressing: keep clean, dry, and in tact, CHRIS drain to be removed tomorrow  - Disposition: Hopeful to be discharged tomorrow    Obstructive sleep apnea treated with continuous positive airway pressure (CPAP)  Chronic, per baseline,    -Start home CPAP per home settings and patient recommendations  Gastroesophageal reflux disease  Chronic, stable    -Continue omeprazole  Mixed hyperlipidemia  Chronic, stable    -Continue atorvastatin  Hospitalist service will follow.      VTE Pharmacologic Prophylaxis:   Moderate Risk (Score 3-4) - Pharmacological DVT Prophylaxis Ordered: enoxaparin (Lovenox).  Code Status: Level 1 - Full Code     History of Present Illness   Chief Complaint: none    Sg Gomez is a 59 y.o. male with past medical history pertinent for obstructive sleep apnea, GERD, hyperlipidemia presented to the medical team for management of medical comorbidities during the process of being postoperative day 0 status post left robotic TKA.  Patient  postprocedure is feeling quite well and has pain relatively well-controlled.  Patient is requiring some degree of oxycodone but has pain controlled and can be decently functional.  Patient was able to work with physical therapy once a day.  Patient has been tolerating diet not experiencing any sort of other symptoms or issues.  Patient denies any numbness, tingling, lack of movement,  lack sensation in the distal lower left extremity.  Patient denies any exacerbation or symptoms of GERD.  Patient brought his CPAP machine with him.  Patient is able to mobilize with much assistance and use of the walker.  Patient has been transferring and going to the bathroom daily.  Patient denies any other symptoms please refer to review of systems.    Review of Systems   Constitutional:  Negative for chills, fatigue and fever.   HENT:  Negative for congestion and rhinorrhea.    Respiratory:  Negative for cough, chest tightness, shortness of breath and wheezing.    Cardiovascular:  Negative for chest pain and palpitations.   Gastrointestinal:  Negative for abdominal distention, abdominal pain, constipation, diarrhea, nausea and vomiting.   Genitourinary:  Negative for difficulty urinating, dysuria and hematuria.   Musculoskeletal:  Positive for arthralgias. Negative for gait problem.   Skin:  Negative for color change and wound.   Neurological:  Negative for dizziness, weakness, light-headedness and numbness.   Psychiatric/Behavioral:  Negative for agitation and confusion.        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 \"     Social History     Tobacco Use    Smoking status: Never    Smokeless tobacco: Never    Tobacco comments:     No smoking per pt - denies any tobacco use per pt    Vaping Use    Vaping status: Never Used   Substance and Sexual Activity    Alcohol use: Yes     Comment: Beer use - weekly    Drug use: No     Comment: Denies any drug use per pt    Sexual activity: Yes     Comment: Denies any chest pain or shortness of breath with activity     E-Cigarette/Vaping    E-Cigarette Use Never User     Comments Denies any use per pt      E-Cigarette/Vaping Substances     Family history non-contributory  Social History:  Marital Status: /Civil Union     Meds/Allergies   I have reviewed home medications using recent Epic encounter.  Prior to Admission medications    Medication Sig Start Date End Date Taking? Authorizing Provider   acetaminophen (TYLENOL) 500 mg tablet Take 2 tablets (1,000 mg total) by mouth every 6 (six) hours as needed for mild pain 1/20/25  Yes Kallie Lindsey PA-C   ascorbic acid (VITAMIN C) 500 MG tablet Take 1 tablet (500 mg total) by mouth 2 (two) times a day 10/29/24  Yes Kallie Lindsey PA-C   atorvastatin (LIPITOR) 20 mg tablet Take 1 tablet (20 mg total) by mouth daily 8/28/24  Yes Kofi Colon MD   Cholecalciferol (VITAMIN D-3 PO) Take by mouth in the morning   Yes Historical Provider, MD   ferrous sulfate 324 (65 Fe) mg Take 1 tablet (324 mg total) by mouth daily before breakfast 10/29/24  Yes Kallie Lindsey PA-C   folic acid (FOLVITE) 1 mg tablet TAKE 1 TABLET BY MOUTH EVERY DAY 11/26/24  Yes Sherman Pierce MD   MAGNESIUM PO Take by mouth in the morning   Yes Historical Provider, MD   methocarbamol (ROBAXIN) 500 mg tablet Take 1 tablet (500 mg total) by mouth 3 (three) times a day as needed for muscle spasms 1/20/25  Yes Kallie Lindsey PA-C   Multiple Vitamins-Minerals (multivitamin with minerals) tablet Take 1 tablet by mouth " daily 10/29/24  Yes Kallie Lindsey PA-C   Multiple Vitamins-Minerals (ZINC PO) Take by mouth   Yes Historical Provider, MD   mupirocin (BACTROBAN) 2 % ointment Apply topically 2 (two) times a day Apply twice a day to each nostril for 5 days including the day of surgery 10/29/24  Yes Kallie Lindsey PA-C   omeprazole (PriLOSEC) 20 mg delayed release capsule Take 1 capsule (20 mg total) by mouth daily 11/4/24  Yes Kofi Colon MD   oxyCODONE (Roxicodone) 5 immediate release tablet Take 1 tablet (5 mg total) by mouth every 6 (six) hours as needed for moderate pain for up to 10 days Max Daily Amount: 20 mg 1/20/25 1/30/25 Yes Kallie Lindsey PA-C   POTASSIUM PO Take by mouth in the morning   Yes Historical Provider, MD   TURMERIC PO Take by mouth in the morning   Yes Historical Provider, MD   enoxaparin (LOVENOX) 40 mg/0.4 mL Inject 0.4 mL (40 mg total) under the skin daily for 28 days To start Post-Op  Patient not taking: Reported on 11/25/2024 10/29/24 11/26/24  Kallie Lindsey PA-C     No Known Allergies    Objective :  Temp:  [97.9 °F (36.6 °C)-99 °F (37.2 °C)] 99 °F (37.2 °C)  HR:  [] 97  BP: (121-154)/(76-99) 143/83  Resp:  [16-18] 18  SpO2:  [93 %-98 %] 94 %  O2 Device: None (Room air)    Physical Exam  Constitutional:       General: He is not in acute distress.     Appearance: Normal appearance. He is normal weight. He is not ill-appearing, toxic-appearing or diaphoretic.      Interventions: He is not intubated.  HENT:      Head: Normocephalic and atraumatic.      Right Ear: Hearing and external ear normal.      Left Ear: Hearing and external ear normal.      Nose: Nose normal.   Cardiovascular:      Rate and Rhythm: Normal rate and regular rhythm.      Pulses:           Dorsalis pedis pulses are 2+ on the right side and 2+ on the left side.      Heart sounds: Normal heart sounds, S1 normal and S2 normal. Heart sounds not distant. No murmur heard.  Pulmonary:      Effort: Pulmonary effort is normal. No tachypnea,  bradypnea, accessory muscle usage, prolonged expiration, respiratory distress or retractions. He is not intubated.      Breath sounds: Normal breath sounds. No stridor, decreased air movement or transmitted upper airway sounds. No decreased breath sounds, wheezing, rhonchi or rales.   Abdominal:      General: Abdomen is flat. There is no distension.      Palpations: Abdomen is soft.      Tenderness: There is no abdominal tenderness.   Musculoskeletal:      Right foot: Normal range of motion.      Left foot: Normal range of motion.   Feet:      Right foot:      Protective Sensation: 4 sites tested.  4 sites sensed.      Skin integrity: Skin integrity normal.      Left foot:      Protective Sensation: 4 sites tested.  4 sites sensed.      Skin integrity: Skin integrity normal.   Skin:     General: Skin is warm and dry.      Capillary Refill: Capillary refill takes less than 2 seconds.   Neurological:      General: No focal deficit present.      Mental Status: He is alert and oriented to person, place, and time.      Sensory: Sensation is intact.      Motor: Motor function is intact.   Psychiatric:         Behavior: Behavior is cooperative.        Left Lower Extremity: Thigh and calf compartments are soft and nontender to palpation. + L3-S1 SILT. + DF/PF.+ EHL. No pain with passive stretch/extension of toes. DP 2+, capillary refill less than 2 seconds, and foot is warm B/L. Incision is c/d/i    Lines/Drains:  Lines/Drains/Airways       Active Status       Name Placement date Placement time Site Days    Closed/Suction Drain Left Knee [facility] 10 Fr. 01/20/25  1239  Knee  less than 1                          Lab Results: I have reviewed the following results:                  Lab Results   Component Value Date    HGBA1C 5.6 12/27/2024    HGBA1C 5.6 08/20/2024    HGBA1C 5.7 (H) 07/31/2023           Imaging Results Review: No pertinent imaging studies reviewed.  Other Study Results Review: No additional pertinent studies  reviewed.    Administrative Statements   Patient was seen for approximately 35 minutes to review history, review physical exam, review labs, reviewing imaging, assessment, plan, patient education.    ** Please Note: This note has been constructed using a voice recognition system. **

## 2025-01-22 ENCOUNTER — OFFICE VISIT (OUTPATIENT)
Dept: PHYSICAL THERAPY | Facility: REHABILITATION | Age: 60
End: 2025-01-22
Payer: COMMERCIAL

## 2025-01-22 ENCOUNTER — TELEPHONE (OUTPATIENT)
Dept: OBGYN CLINIC | Facility: HOSPITAL | Age: 60
End: 2025-01-22

## 2025-01-22 DIAGNOSIS — M17.12 PRIMARY OSTEOARTHRITIS OF LEFT KNEE: Primary | ICD-10-CM

## 2025-01-22 DIAGNOSIS — G89.29 CHRONIC PAIN OF LEFT KNEE: ICD-10-CM

## 2025-01-22 DIAGNOSIS — M25.562 CHRONIC PAIN OF LEFT KNEE: ICD-10-CM

## 2025-01-22 PROCEDURE — 97161 PT EVAL LOW COMPLEX 20 MIN: CPT | Performed by: PHYSICAL THERAPIST

## 2025-01-22 NOTE — PROGRESS NOTES
PT Evaluation     Today's date: 2025  Patient name: Sg Gomez  : 1965  MRN: [id number]  Referring provider: Sherman Pierce,*  Dx:   Encounter Diagnosis     ICD-10-CM    1. Primary osteoarthritis of left knee  M17.12       2. Chronic pain of left knee  M25.562     G89.29           Start Time: 0900  Stop Time: 0945  Total time in clinic (min): 45 minutes    Assessment  Impairments: abnormal gait, abnormal muscle firing, abnormal or restricted ROM, abnormal movement, activity intolerance, impaired physical strength, lacks appropriate home exercise program, pain with function, weight-bearing intolerance, unable to perform ADL and activity limitations  Symptom irritability: moderate    Assessment details: Patient demonstrated left knee edema and limitations in left knee active and passive range of motion, as expected following L TKA. Patient educated on proper transfers, lower extremity positioning to prevent knee contracture, signs and symptoms of DVT, and icing regime. Patient provided with initial exercises to begin at home to address edema/range of motion/strength deficits. Patient would benefit from skilled PT services to address the above impairments.     Goals  ST. Patient will be able to get in/out of the car with 50% less pain in 3 weeks.  2. Patient will increase knee flexion ROM to 110 degrees to aid in getting in/out of the car in 3 weeks.  3. Patient will be able to ambulate with a reciprocal gait pattern using LRAD in 3 weeks.  LT. Patient will be able to get in/out of the car with no pain in 8 weeks.  2. Patient will achieve 120 degrees of knee flexion to aid in proper performance of functional activities in 8 weeks.  3. Patient will be able to ambulate > than community distances with no AD in 8 weeks.     Plan  Patient would benefit from: skilled physical therapy  Referral necessary: No  Planned modality interventions: cryotherapy    Planned therapy interventions:  activity modification, ADL retraining, balance/weight bearing training, functional ROM exercises, gait training, graded exercise, home exercise program, work reintegration, stretching, strengthening, therapeutic activities, therapeutic exercise, patient/caregiver education, neuromuscular re-education, manual therapy, joint mobilization, flexibility and transfer training    Frequency: 2x week  Duration in weeks: 8  Treatment plan discussed with: patient      Subjective Evaluation    History of Present Illness  Date of surgery: 2025  Mechanism of injury: surgery  Mechanism of injury: Pt presents to PT initial evaluation following a L TKA on Monday (25). Pt reports his knee hurts, especially when getting into and out of the car. He notes the pain is worst at his upper left thigh. He states the pain is tolerable (5/10) when getting up and walking around. He lives in a ranch style home, so only had to ascend/descend about two steps to get into/out of the home. He notes that navigating the stairs hasn't been too bad, other than that he has to move slowly and cautiously. He explains that he slept well the previous night, with his pain lowering to a 2/10. 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     Per patient, physician advised to keep bandage on until follow-up appointment.     Aggravating factors: getting in/out of car, walking, bending knee               Not a recurrent problem   Quality of life: good    Patient Goals  Patient goals for therapy: decreased pain, increased strength, independence with ADLs/IADLs, decreased edema, return to work and increased motion  Patient goal: return to work  Pain  Current pain ratin  At best pain ratin  At worst pain ratin  Location: left lower extremity  Quality: dull ache, discomfort and pressure (numbness)  Relieving factors: ice, medications, relaxation and rest  Aggravating factors: walking and standing (getting in/out of the  car)    Treatments  Discharged from (in last 30 days): inpatient hospitalization        Objective     Active Range of Motion   Left Knee   Flexion: 70 degrees with pain  Extension: -5 degrees with pain    Right Knee   Flexion: 135 degrees   Extension: 0 degrees     Passive Range of Motion   Left Knee   Flexion: 75 degrees with pain  Extension: -3 degrees with pain    Right Knee   Flexion: 140 degrees   Extension: 0 degrees     Ambulation   Weight-Bearing Status   Assistive device used: front-wheeled walker    Observational Gait   Gait: antalgic   Increased right stance time. Decreased walking speed, stride length and left stance time.     Additional Observational Gait Details  Step to gait pattern using rolling walker              Precautions:   Patient Active Problem List   Diagnosis    Obstructive sleep apnea treated with continuous positive airway pressure (CPAP)    Obesity (BMI 35.0-39.9 without comorbidity)    Mixed hyperlipidemia    Gastroesophageal reflux disease    Hyperglycemia    Left knee pain    Chronic left shoulder pain    Primary osteoarthritis of left knee          Manuals             L Knee PROM                                                    Neuro Re-Ed                                                                                                        Ther Ex             Quad Sets             Glute Sets             SAQ             LAQ             Heel slides              Supine SLR             Standing HR/TR             Standing 4-Way Hip             Ther Activity             Bike                          Gait Training                                       Modalities                                          Patient primarily treated by KI Draper with direct supervision throughout session by clinical instructor/staff therapist Taz Arroyo DPT.

## 2025-01-22 NOTE — TELEPHONE ENCOUNTER
"Patient contacted for a postoperative follow up assessment. Patient reports \"doing fine.\" He reports he \"slept really well.\"  Patient states current pain level of a  2/10  when sitting and 5/10 when walking with RW. Patient denies increase in swelling, stating \"not noticed\" and dressing is clean, dry and intact. Patient is icing the site, we discussed postoperative swelling, continuing to ICE areas of pain/swelling, especially after increased activity over the next few weeks.  He has OP PT today.     We reviewed patients AVS medication list. Patient is taking Tylenol 1000mg every 8 hours,  Robaxin 500mg TID PRN, Oxycodone 5mg PRN, Lovenox daily,  and an OTC stool softener, he denies yet having a BM, we discussed increasing fluid and fiber intake and taking stool softener until going regularly.     Patient denies nausea, vomiting, abdominal pain, chest pain, shortness of breath, fever, dizziness and calf pain. Patient does not have any other questions or concerns at this time. Pt was encouraged to call with any questions, concerns or issues.    "

## 2025-01-27 ENCOUNTER — OFFICE VISIT (OUTPATIENT)
Dept: PHYSICAL THERAPY | Facility: REHABILITATION | Age: 60
End: 2025-01-27
Payer: COMMERCIAL

## 2025-01-27 DIAGNOSIS — G89.29 CHRONIC PAIN OF LEFT KNEE: ICD-10-CM

## 2025-01-27 DIAGNOSIS — M17.12 PRIMARY OSTEOARTHRITIS OF LEFT KNEE: Primary | ICD-10-CM

## 2025-01-27 DIAGNOSIS — M25.562 CHRONIC PAIN OF LEFT KNEE: ICD-10-CM

## 2025-01-27 PROCEDURE — 97110 THERAPEUTIC EXERCISES: CPT | Performed by: PHYSICAL THERAPIST

## 2025-01-27 NOTE — PROGRESS NOTES
Daily Note     Today's date: 2025  Patient name: Sg Gomez  : 1965  MRN: [id number]  Referring provider: Sherman Pierce,*  Dx:   Encounter Diagnosis     ICD-10-CM    1. Primary osteoarthritis of left knee  M17.12       2. Chronic pain of left knee  M25.562     G89.29           Start Time: 1430  Stop Time: 1505  Total time in clinic (min): 35 minutes    Subjective: Pt reports feeling okay today. He notes being in pain and that he took his last pain medication at 9am, because it has been making him feel dizzy. He notes that getting in and out of the car has been getting a little easier for him. Otherwise, he has nothing new to report.       Objective: See treatment diary below.      Assessment: Tolerated treatment fair. Patient able to perform 5 revolutions on the bike, but was unable to consistently pedal around. Patient unable to perform SAQ due to reduced quadriceps strength and pain. Patient demonstrated improvement in knee flexion range of motion. Measured passive knee flexion range of motion at 85 degrees. Patient educated to discuss the impaired tolerance of his pain medication with his physician tomorrow to see if he can get an alternative. Patient encouraged to continue icing and elevating his leg. Patient demonstrated fatigue post treatment, exhibited good technique with therapeutic exercises, and would benefit from continued PT.      Plan: Progress treatment as tolerated.  May benefit from NMES during quad sets and SAQ to facilitate quadriceps activation.      Precautions:   Patient Active Problem List   Diagnosis    Obstructive sleep apnea treated with continuous positive airway pressure (CPAP)    Obesity (BMI 35.0-39.9 without comorbidity)    Mixed hyperlipidemia    Gastroesophageal reflux disease    Hyperglycemia    Left knee pain    Chronic left shoulder pain    Primary osteoarthritis of left knee          Manuals             L Knee PROM BW                                     "                Neuro Re-Ed             NMES nv                                                                                          Ther Ex             Quad Sets 10\"x10            Glute Sets 10\"x10            SAQ 10\"x10             AA LAQ 10\"x10            Heel slides  10\"x10            Supine SLR             Standing HR/TR 20x ea            TKE @ wall 10\"x10            Standing Hip ext, abd, flx 5\" x10 ea            Ther Activity             Bike 5 revolutions                         Gait Training                                       Modalities                                            Patient primarily treated by KI Draper with direct supervision throughout session by clinical instructor/staff therapist TIN JayT.   "

## 2025-01-28 ENCOUNTER — OFFICE VISIT (OUTPATIENT)
Dept: OBGYN CLINIC | Facility: HOSPITAL | Age: 60
End: 2025-01-28

## 2025-01-28 ENCOUNTER — HOSPITAL ENCOUNTER (OUTPATIENT)
Dept: RADIOLOGY | Facility: HOSPITAL | Age: 60
Discharge: HOME | End: 2025-01-28
Attending: ORTHOPAEDIC SURGERY
Payer: COMMERCIAL

## 2025-01-28 VITALS — HEIGHT: 73 IN | BODY MASS INDEX: 34.17 KG/M2

## 2025-01-28 DIAGNOSIS — Z47.1 AFTERCARE FOLLOWING LEFT KNEE JOINT REPLACEMENT SURGERY: ICD-10-CM

## 2025-01-28 DIAGNOSIS — Z47.1 AFTERCARE FOLLOWING LEFT KNEE JOINT REPLACEMENT SURGERY: Primary | ICD-10-CM

## 2025-01-28 DIAGNOSIS — Z96.652 AFTERCARE FOLLOWING LEFT KNEE JOINT REPLACEMENT SURGERY: ICD-10-CM

## 2025-01-28 DIAGNOSIS — Z96.652 AFTERCARE FOLLOWING LEFT KNEE JOINT REPLACEMENT SURGERY: Primary | ICD-10-CM

## 2025-01-28 PROCEDURE — 99024 POSTOP FOLLOW-UP VISIT: CPT | Performed by: ORTHOPAEDIC SURGERY

## 2025-01-28 PROCEDURE — 73560 X-RAY EXAM OF KNEE 1 OR 2: CPT

## 2025-01-28 RX ORDER — HYDROCODONE BITARTRATE AND ACETAMINOPHEN 5; 325 MG/1; MG/1
1 TABLET ORAL EVERY 6 HOURS PRN
Qty: 30 TABLET | Refills: 0 | Status: SHIPPED | OUTPATIENT
Start: 2025-01-28

## 2025-01-28 RX ORDER — CELECOXIB 200 MG/1
200 CAPSULE ORAL 2 TIMES DAILY
Qty: 60 CAPSULE | Refills: 2 | Status: SHIPPED | OUTPATIENT
Start: 2025-01-28

## 2025-01-28 NOTE — PROGRESS NOTES
Assessment:   Diagnosis ICD-10-CM Associated Orders   1. Aftercare following left knee joint replacement surgery  Z47.1 celecoxib (CeleBREX) 200 mg capsule    Z96.652 HYDROcodone-acetaminophen (Norco) 5-325 mg per tablet          Plan:  59 y.o. male 8 days status post left total knee arthroplasty  Continue physical therapy   Continue weight bearing activities as tolerated   Continue pain medications as needed  Celebrex sent to pharmacy today to be taken twice a day   Oxycodone causing migraines, [place] sent to pharmacy today     Continue lovenox as prescribed    Able to shower, letting warm soapy water run over incision and only pat dry   Follow up in 1 week for 2 week post-op appointment and removal of staples      The above stated was discussed in layman's terms and the patient expressed understanding.  All questions were answered to the patient's satisfaction.     To do next visit:  Return in about 1 week (around 2/4/2025) for 2-week postop appointment and removal of staples.      Subjective:   Sg Gomez is a 59 y.o. male who presents 8 days s/p left total knee arthroplasty.  He is doing well.  He admits to significant pain of the left knee, which she has been managing with oxycodone, Tylenol, and muscle relaxer.  He admits to oxycodone causing significant headaches which has limited his ability to take the medication.  [place] was sent to pharmacy today and patient was advised to follow-up if he continues to have headaches with new medication.  Celebrex was also sent to pharmacy today for additional pain relief.  Patient has been working with physical therapy currently scheduled twice a week and admits to making some progress.  He is ambulating primarily with a walker however does present in a wheelchair today.  He continues to take Lovenox daily.          Review of systems negative unless otherwise specified in HPI    Past Medical History:   Diagnosis Date   • CPAP (continuous positive airway pressure)  "dependence    • GERD (gastroesophageal reflux disease)    • High cholesterol    • Knee pain, left    • Obstructive sleep apnea     Last Assessed:6/8/16       Past Surgical History:   Procedure Laterality Date   • COLONOSCOPY     • KNEE SURGERY Bilateral     Bursa sacs removed bilaterally   • MT ARTHRP KNE CONDYLE&PLATU MEDIAL&LAT COMPARTMENTS Left 1/20/2025    Procedure: ARTHROPLASTY KNEE TOTAL W ROBOT;  Surgeon: Sherman Pierce MD;  Location:  MAIN OR;  Service: Orthopedics   • SURGERY SCROTAL / TESTICULAR      1/9/25 per pt as a child had to have his testicles dropped \"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 \"       Family History   Problem Relation Age of Onset   • Hypertension Mother    • Lung cancer Father    • Anesthesia problems Son        Social History     Occupational History   • Not on file   Tobacco Use   • Smoking status: Never   • Smokeless tobacco: Never   • Tobacco comments:     No smoking per pt - denies any tobacco use per pt    Vaping Use   • Vaping status: Never Used   Substance and Sexual Activity   • Alcohol use: Yes     Comment: Beer use - weekly   • Drug use: No     Comment: Denies any drug use per pt   • Sexual activity: Yes     Comment: Denies any chest pain or shortness of breath with activity         Current Outpatient Medications:   •  celecoxib (CeleBREX) 200 mg capsule, Take 1 capsule (200 mg total) by mouth 2 (two) times a day, Disp: 60 capsule, Rfl: 2  •  HYDROcodone-acetaminophen (Norco) 5-325 mg per tablet, Take 1 tablet by mouth every 6 (six) hours as needed for pain Max Daily Amount: 4 tablets, Disp: 30 tablet, Rfl: 0  •  Acetaminophen Extra Strength 500 MG TABS, TAKE 2 TABLETS (1,000 MG TOTAL) BY MOUTH EVERY 6 (SIX) HOURS AS NEEDED FOR MILD PAIN, Disp: 90 tablet, Rfl: 0  •  atorvastatin (LIPITOR) 20 mg tablet, Take 1 tablet (20 mg total) by mouth daily, Disp: 90 tablet, Rfl: 1  •  Cholecalciferol (VITAMIN D-3 PO), Take by mouth in the morning, Disp: , Rfl:   •  enoxaparin " (LOVENOX) 40 mg/0.4 mL, Inject 0.4 mL (40 mg total) under the skin daily for 28 days To start Post-Op (Patient not taking: Reported on 11/25/2024), Disp: 11.2 mL, Rfl: 0  •  MAGNESIUM PO, Take by mouth in the morning, Disp: , Rfl:   •  methocarbamol (ROBAXIN) 500 mg tablet, Take 1 tablet (500 mg total) by mouth 3 (three) times a day as needed for muscle spasms, Disp: 60 tablet, Rfl: 0  •  Multiple Vitamins-Minerals (multivitamin with minerals) tablet, Take 1 tablet by mouth daily, Disp: 30 tablet, Rfl: 2  •  Multiple Vitamins-Minerals (ZINC PO), Take by mouth, Disp: , Rfl:   •  omeprazole (PriLOSEC) 20 mg delayed release capsule, Take 1 capsule (20 mg total) by mouth daily, Disp: 90 capsule, Rfl: 1  •  oxyCODONE (Roxicodone) 5 immediate release tablet, Take 1 tablet (5 mg total) by mouth every 6 (six) hours as needed for moderate pain for up to 10 days Max Daily Amount: 20 mg, Disp: 30 tablet, Rfl: 0  •  POTASSIUM PO, Take by mouth in the morning, Disp: , Rfl:   •  TURMERIC PO, Take by mouth in the morning, Disp: , Rfl:     No Known Allergies       There were no vitals filed for this visit.    Objective:  Physical exam  General: Awake, Alert, Oriented  Eyes: Pupils equal, round and reactive to light  Heart: regular rate and rhythm  Lungs: No audible wheezing  Abdomen: soft                    Ortho Exam  Left knee:  Incision clean dry and intact  Surgical dressing removed in office today  Sara well approximated   No erythema   moderate ecchymosis of leg  Appropriate swelling of lower limb  Appropriate warmth of knee  Extensor mechanism intact  Extension 15  Flexion 70  Calf compartments soft and supple  Sensation intact  Toes are warm sensate and mobile     Diagnostics, reviewed and taken today if performed as documented:    Left knee x-ray:   - Well aligned prosthesis without evidence of fractures, acute changes, or hardware failure  - Prosthesis appears properly sized and properly bonded to surrounding bone      "      Procedures, if performed today:    None performed      Portions of the record may have been created with voice recognition software.  Occasional wrong word or \"sound a like\" substitutions may have occurred due to the inherent limitations of voice recognition software.  Read the chart carefully and recognize, using context, where substitutions have occurred.      "

## 2025-01-30 ENCOUNTER — OFFICE VISIT (OUTPATIENT)
Dept: PHYSICAL THERAPY | Facility: REHABILITATION | Age: 60
End: 2025-01-30
Payer: COMMERCIAL

## 2025-01-30 DIAGNOSIS — M25.562 CHRONIC PAIN OF LEFT KNEE: ICD-10-CM

## 2025-01-30 DIAGNOSIS — G89.29 CHRONIC PAIN OF LEFT KNEE: ICD-10-CM

## 2025-01-30 DIAGNOSIS — M17.12 PRIMARY OSTEOARTHRITIS OF LEFT KNEE: Primary | ICD-10-CM

## 2025-01-30 PROCEDURE — 97112 NEUROMUSCULAR REEDUCATION: CPT

## 2025-01-30 PROCEDURE — 97110 THERAPEUTIC EXERCISES: CPT

## 2025-01-30 PROCEDURE — G0283 ELEC STIM OTHER THAN WOUND: HCPCS

## 2025-01-30 PROCEDURE — 97014 ELECTRIC STIMULATION THERAPY: CPT

## 2025-01-30 NOTE — PROGRESS NOTES
Daily Note     Today's date: 2025  Patient name: Sg Gomez  : 1965  MRN: [id number]  Referring provider: Sherman Pierce,*  Dx:   Encounter Diagnosis     ICD-10-CM    1. Primary osteoarthritis of left knee  M17.12       2. Chronic pain of left knee  M25.562     G89.29           Start Time: 0900  Stop Time: 0955  Total time in clinic (min): 55 minutes    Subjective: Pt reports feeling okay today. Followed up with surgeon yesterday, where he notes the bandages were changed and and he was cleared to shower. He notes still taking his pain medication, which he was able to get changed. He reports better tolerance with the new medication, no longer feeling dizzy or getting headaches. Otherwise, no new changes to report.      Objective: See treatment diary below.      Assessment: Tolerated treatment well. Patient demonstrated good tolerance to NMES during quad sets and short arc quads. Able to raise heel off plinth during short arc quads during today's session, compared to last session. Measured knee flexion passive range of motion at 86 degrees. Patient educated that he may experience some soreness in the quads following today's session. Encouraged to continue with home exercises. Patient would benefit from home electrical stimulation unit to help address lower extremity atrophy. Patient demonstrated fatigue post treatment, exhibited good technique with therapeutic exercises, and would benefit from continued PT.      Plan: Progress treatment as tolerated.       Precautions:   Patient Active Problem List   Diagnosis    Obstructive sleep apnea treated with continuous positive airway pressure (CPAP)    Obesity (BMI 35.0-39.9 without comorbidity)    Mixed hyperlipidemia    Gastroesophageal reflux disease    Hyperglycemia    Left knee pain    Chronic left shoulder pain    Primary osteoarthritis of left knee          Manuals            L Knee PROM BW  BW                                             "      Neuro Re-Ed             NMES nv 280 PD  85 F  10/50 CT  80 amp  10 min                                                                                         Ther Ex             Quad Sets c NMES 10\"x10 10 sec hold 5 min           Glute Sets 10\"x10            SAQ C NMES 10\"x10  10 sec hold 5 min           AA LAQ 10\"x10            Heel slides  10\"x10            Supine SLR             Standing HR/TR 20x ea 20x ea           TKE @ wall 10\"x10 10\"x10           Standing Hip ext, abd, flx 5\" x10 ea 5\" x15 ea           Ther Activity             Bike 5 revolutions 5 min fwd/bwd                        Gait Training                                       Modalities                                              Patient primarily treated by KI Draper with direct supervision throughout session by clinical instructor/staff therapist Taz Arroyo DPT.    "

## 2025-02-03 ENCOUNTER — OFFICE VISIT (OUTPATIENT)
Dept: PHYSICAL THERAPY | Facility: REHABILITATION | Age: 60
End: 2025-02-03
Payer: COMMERCIAL

## 2025-02-03 DIAGNOSIS — M25.562 CHRONIC PAIN OF LEFT KNEE: ICD-10-CM

## 2025-02-03 DIAGNOSIS — M17.12 PRIMARY OSTEOARTHRITIS OF LEFT KNEE: Primary | ICD-10-CM

## 2025-02-03 DIAGNOSIS — G89.29 CHRONIC PAIN OF LEFT KNEE: ICD-10-CM

## 2025-02-03 PROCEDURE — 97140 MANUAL THERAPY 1/> REGIONS: CPT | Performed by: PHYSICAL THERAPIST

## 2025-02-03 PROCEDURE — G0283 ELEC STIM OTHER THAN WOUND: HCPCS | Performed by: PHYSICAL THERAPIST

## 2025-02-03 PROCEDURE — 97112 NEUROMUSCULAR REEDUCATION: CPT | Performed by: PHYSICAL THERAPIST

## 2025-02-03 PROCEDURE — 97014 ELECTRIC STIMULATION THERAPY: CPT | Performed by: PHYSICAL THERAPIST

## 2025-02-03 PROCEDURE — 97110 THERAPEUTIC EXERCISES: CPT | Performed by: PHYSICAL THERAPIST

## 2025-02-03 NOTE — PROGRESS NOTES
Daily Note     Today's date: 2/3/2025  Patient name: Sg Gomez  : 1965  MRN: [id number]  Referring provider: Sherman Pierce,*  Dx:   Encounter Diagnosis     ICD-10-CM    1. Primary osteoarthritis of left knee  M17.12       2. Chronic pain of left knee  M25.562     G89.29           Start Time: 1115  Stop Time: 1200  Total time in clinic (min): 45 minutes    Subjective: Pt reports that his knee feels a little better today. He notes that he felt a little sore following last treatment session, but otherwise felt okay.       Objective: See treatment diary below.      Assessment: Tolerated treatment well. Patient demonstrated improved tolerance to rotations on the bike compared to last week. Patient tolerated NMES while performing quad sets and short arc quads well, adjusted parameters to obtain a stronger quadriceps contraction. Pulse duration was increased to 480 usec. Left knee flexion passive range of motion was measured at 90 degrees. Patient educated on terminal knee extension exercises to perform at home. Patient demonstrated fatigue post treatment, exhibited good technique with therapeutic exercises, and would benefit from continued PT. Patient has appointment scheduled for tomorrow with the physician for follow-up and staple removal.        Plan: Progress treatment as tolerated.       Precautions:   Patient Active Problem List   Diagnosis    Obstructive sleep apnea treated with continuous positive airway pressure (CPAP)    Obesity (BMI 35.0-39.9 without comorbidity)    Mixed hyperlipidemia    Gastroesophageal reflux disease    Hyperglycemia    Left knee pain    Chronic left shoulder pain    Primary osteoarthritis of left knee          Manuals  2/3          L Knee PROM BW  BW BW                                                 Neuro Re-Ed             NMES nv 280 PD  85 F  10/50 CT  80 amp  10 min Symmetrical biphasic  480 PD  85 F  10/50 CT  80 amp  10 min                                 "                                                        Ther Ex             Quad Sets c NMES 10\"x10 10 sec hold 5 min 10 sec hold 5 min          Glute Sets 10\"x10            SAQ C NMES 10\"x10  10 sec hold 5 min 10 sec hold 5 min          AA LAQ 10\"x10            Heel slides  10\"x10  10\"x10          Supine SLR             Standing HR/TR 20x ea 20x ea 20x ea          TKE @ wall 10\"x10 10\"x10 10\"x10          Standing Hip ext, abd, flx 5\" x10 ea 5\" x15 ea 5\" x15 ea          Ther Activity             Bike 5 revolutions 5 min fwd/bwd 5 min fwd/bwd                       Gait Training                                       Modalities                                                Patient primarily treated by KI Draper with direct supervision throughout session by clinical instructor/staff therapist Taz Arroyo DPT.   "

## 2025-02-04 ENCOUNTER — OFFICE VISIT (OUTPATIENT)
Dept: OBGYN CLINIC | Facility: HOSPITAL | Age: 60
End: 2025-02-04

## 2025-02-04 VITALS — HEIGHT: 73 IN | BODY MASS INDEX: 34.17 KG/M2

## 2025-02-04 DIAGNOSIS — Z47.1 AFTERCARE FOLLOWING LEFT KNEE JOINT REPLACEMENT SURGERY: Primary | ICD-10-CM

## 2025-02-04 DIAGNOSIS — Z96.652 AFTERCARE FOLLOWING LEFT KNEE JOINT REPLACEMENT SURGERY: Primary | ICD-10-CM

## 2025-02-04 PROCEDURE — 99024 POSTOP FOLLOW-UP VISIT: CPT | Performed by: ORTHOPAEDIC SURGERY

## 2025-02-04 NOTE — PROGRESS NOTES
"Assessment:   Diagnosis ICD-10-CM Associated Orders   1. Aftercare following left knee joint replacement surgery  Z47.1     Z96.652           Plan:  59 y.o. male 2 weeks status post left TKA  Continue physical therapy   Continue weight bearing activities as tolerated   Continue pain medications as needed   Continue lovenox as prescribed    Staples removed in office today, incision cleaned, steri-strips applied   Follow up in 4 weeks for 6 week post-op appointment      The above stated was discussed in layman's terms and the patient expressed understanding.  All questions were answered to the patient's satisfaction.     To do next visit:  Return in about 4 weeks (around 3/4/2025) for 6-week postop appointment.      Subjective:   Sg Gomez is a 59 y.o. male who presents 2 weeks s/p left total knee arthroplasty.  He is doing well.  He admits to some pain of the left knee which she has been able to manage well with hydrocodone, and Celebrex.  He continues to attend physical therapy twice a week and admits to making good progress.  Patient continues to ambulate well with a walker today.  He admits to taking Lovenox daily.      Review of systems negative unless otherwise specified in HPI    Past Medical History:   Diagnosis Date   • CPAP (continuous positive airway pressure) dependence    • GERD (gastroesophageal reflux disease)    • High cholesterol    • Knee pain, left    • Obstructive sleep apnea     Last Assessed:6/8/16       Past Surgical History:   Procedure Laterality Date   • COLONOSCOPY     • KNEE SURGERY Bilateral     Bursa sacs removed bilaterally   • NV ARTHRP KNE CONDYLE&PLATU MEDIAL&LAT COMPARTMENTS Left 1/20/2025    Procedure: ARTHROPLASTY KNEE TOTAL W ROBOT;  Surgeon: Sherman Pierce MD;  Location: [facility];  Service: Orthopedics   • SURGERY SCROTAL / TESTICULAR      1/9/25 per pt as a child had to have his testicles dropped \"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 \"       Family History   Problem " Relation Age of Onset   • Hypertension Mother    • Lung cancer Father    • Anesthesia problems Son        Social History     Occupational History   • Not on file   Tobacco Use   • Smoking status: Never   • Smokeless tobacco: Never   • Tobacco comments:     No smoking per pt - denies any tobacco use per pt    Vaping Use   • Vaping status: Never Used   Substance and Sexual Activity   • Alcohol use: Yes     Comment: Beer use - weekly   • Drug use: No     Comment: Denies any drug use per pt   • Sexual activity: Yes     Comment: Denies any chest pain or shortness of breath with activity         Current Outpatient Medications:   •  Acetaminophen Extra Strength 500 MG TABS, TAKE 2 TABLETS (1,000 MG TOTAL) BY MOUTH EVERY 6 (SIX) HOURS AS NEEDED FOR MILD PAIN, Disp: 90 tablet, Rfl: 0  •  atorvastatin (LIPITOR) 20 mg tablet, Take 1 tablet (20 mg total) by mouth daily, Disp: 90 tablet, Rfl: 1  •  celecoxib (CeleBREX) 200 mg capsule, Take 1 capsule (200 mg total) by mouth 2 (two) times a day, Disp: 60 capsule, Rfl: 2  •  Cholecalciferol (VITAMIN D-3 PO), Take by mouth in the morning, Disp: , Rfl:   •  enoxaparin (LOVENOX) 40 mg/0.4 mL, Inject 0.4 mL (40 mg total) under the skin daily for 28 days To start Post-Op (Patient not taking: Reported on 11/25/2024), Disp: 11.2 mL, Rfl: 0  •  HYDROcodone-acetaminophen (Norco) 5-325 mg per tablet, Take 1 tablet by mouth every 6 (six) hours as needed for pain Max Daily Amount: 4 tablets, Disp: 30 tablet, Rfl: 0  •  MAGNESIUM PO, Take by mouth in the morning, Disp: , Rfl:   •  methocarbamol (ROBAXIN) 500 mg tablet, Take 1 tablet (500 mg total) by mouth 3 (three) times a day as needed for muscle spasms, Disp: 60 tablet, Rfl: 0  •  Multiple Vitamins-Minerals (multivitamin with minerals) tablet, Take 1 tablet by mouth daily, Disp: 30 tablet, Rfl: 2  •  Multiple Vitamins-Minerals (ZINC PO), Take by mouth, Disp: , Rfl:   •  omeprazole (PriLOSEC) 20 mg delayed release capsule, Take 1 capsule (20  "mg total) by mouth daily, Disp: 90 capsule, Rfl: 1  •  POTASSIUM PO, Take by mouth in the morning, Disp: , Rfl:   •  TURMERIC PO, Take by mouth in the morning, Disp: , Rfl:     No Known Allergies       There were no vitals filed for this visit.    Objective:  Physical exam  General: Awake, Alert, Oriented  Eyes: Pupils equal, round and reactive to light  Heart: regular rate and rhythm  Lungs: No audible wheezing  Abdomen: soft                    Ortho Exam  Left knee:  Incision clean dry and intact  Staples well approximated; removed in office today  Incision cleaned, steri-strips applied    No erythema    mild ecchymosis of leg  Appropriate swelling of lower limb  Appropriate warmth of knee  Extensor mechanism intact  Extension 10  Flexion 90  Calf compartments soft and supple  Sensation intact  Toes are warm sensate and mobile     Diagnostics, reviewed and taken today if performed as documented:    None performed        Procedures, if performed today:    None performed      Portions of the record may have been created with voice recognition software.  Occasional wrong word or \"sound a like\" substitutions may have occurred due to the inherent limitations of voice recognition software.  Read the chart carefully and recognize, using context, where substitutions have occurred.      "

## 2025-02-06 ENCOUNTER — APPOINTMENT (OUTPATIENT)
Dept: PHYSICAL THERAPY | Facility: REHABILITATION | Age: 60
End: 2025-02-06
Payer: COMMERCIAL

## 2025-02-07 ENCOUNTER — OFFICE VISIT (OUTPATIENT)
Dept: PHYSICAL THERAPY | Facility: REHABILITATION | Age: 60
End: 2025-02-07
Payer: COMMERCIAL

## 2025-02-07 DIAGNOSIS — M17.12 PRIMARY OSTEOARTHRITIS OF LEFT KNEE: ICD-10-CM

## 2025-02-07 DIAGNOSIS — M25.562 CHRONIC PAIN OF LEFT KNEE: ICD-10-CM

## 2025-02-07 DIAGNOSIS — M17.12 PRIMARY OSTEOARTHRITIS OF LEFT KNEE: Primary | ICD-10-CM

## 2025-02-07 DIAGNOSIS — G89.29 CHRONIC PAIN OF LEFT KNEE: ICD-10-CM

## 2025-02-07 PROCEDURE — 97140 MANUAL THERAPY 1/> REGIONS: CPT

## 2025-02-07 PROCEDURE — 97112 NEUROMUSCULAR REEDUCATION: CPT

## 2025-02-07 PROCEDURE — 97116 GAIT TRAINING THERAPY: CPT

## 2025-02-07 PROCEDURE — 97110 THERAPEUTIC EXERCISES: CPT

## 2025-02-07 RX ORDER — METHOCARBAMOL 500 MG/1
TABLET, FILM COATED ORAL
Qty: 60 TABLET | Refills: 0 | Status: SHIPPED | OUTPATIENT
Start: 2025-02-07

## 2025-02-07 NOTE — PROGRESS NOTES
"Daily Note     Today's date: 2025  Patient name: Sg Gomez  : 1965  MRN: [id number]  Referring provider: Sherman Pierce,*  Dx:   Encounter Diagnosis     ICD-10-CM    1. Primary osteoarthritis of left knee  M17.12       2. Chronic pain of left knee  M25.562     G89.29                      Subjective: pt reports returning surgeon in which the staples were removed and was pleased with his progress. He presented with quad cane today and noted he started with it yesterday.      Objective: See treatment diary below      Assessment: pt tolerated treatment well. Exercises and manuals performed with no adverse reactions. GT performed with quad cane as pt presented with use on incorrect side; good carry over with corrections. Issued/reviewed setup and use of home NMES unit with no questions or concerns expressed. Pt instructed to perform 2x/day to further promote increasing quad activation. Patient demonstrated fatigue post treatment, exhibited good technique with therapeutic exercises, and would benefit from continued PT      Plan: Continue per plan of care.  Progress treatment as tolerated.       Precautions:   Patient Active Problem List   Diagnosis    Obstructive sleep apnea treated with continuous positive airway pressure (CPAP)    Obesity (BMI 35.0-39.9 without comorbidity)    Mixed hyperlipidemia    Gastroesophageal reflux disease    Hyperglycemia    Left knee pain    Chronic left shoulder pain    Primary osteoarthritis of left knee          Manuals 1/27 1/30 2/3 2         L Knee PROM BW  BW BW TE                                                Neuro Re-Ed             NMES nv 280 PD  85 F  10/50 CT  80 amp  10 min Symmetrical biphasic  480 PD  85 F  10/50 CT  80 amp  10 min NV    Completed w/ home TENS today                                                                                       Ther Ex             Quad Sets c NMES 10\"x10 10 sec hold 5 min 10 sec hold 5 min 10 sec hold 5 min       " "  Glute Sets 10\"x10            SAQ C NMES 10\"x10  10 sec hold 5 min 10 sec hold 5 min 10 sec hold 5 min         AA LAQ 10\"x10            Heel slides  10\"x10  10\"x10 10\"x10         Supine SLR             Standing HR/TR 20x ea 20x ea 20x ea x20ea         TKE @ wall 10\"x10 10\"x10 10\"x10 5\"x15         Standing Hip ext, abd, flx 5\" x10 ea 5\" x15 ea 5\" x15 ea 5\"x15 ea         Ther Activity             Bike 5 revolutions 5 min fwd/bwd 5 min fwd/bwd 5 min fwd/bwd                      Gait Training                                       Modalities                                                  "

## 2025-02-11 ENCOUNTER — OFFICE VISIT (OUTPATIENT)
Dept: PHYSICAL THERAPY | Facility: REHABILITATION | Age: 60
End: 2025-02-11
Payer: COMMERCIAL

## 2025-02-11 DIAGNOSIS — M17.12 PRIMARY OSTEOARTHRITIS OF LEFT KNEE: Primary | ICD-10-CM

## 2025-02-11 DIAGNOSIS — M25.562 CHRONIC PAIN OF LEFT KNEE: ICD-10-CM

## 2025-02-11 DIAGNOSIS — G89.29 CHRONIC PAIN OF LEFT KNEE: ICD-10-CM

## 2025-02-11 PROCEDURE — 97140 MANUAL THERAPY 1/> REGIONS: CPT | Performed by: PHYSICAL THERAPIST

## 2025-02-11 PROCEDURE — 97110 THERAPEUTIC EXERCISES: CPT | Performed by: PHYSICAL THERAPIST

## 2025-02-11 NOTE — PROGRESS NOTES
Daily Note     Today's date: 2025  Patient name: Sg Gomez  : 1965  MRN: [id number]  Referring provider: Sherman Pierce,*  Dx:   Encounter Diagnosis     ICD-10-CM    1. Primary osteoarthritis of left knee  M17.12       2. Chronic pain of left knee  M25.562     G89.29           Start Time: 0900  Stop Time: 0945  Total time in clinic (min): 45 minutes    Subjective: Pt reports that his knee is doing better and feels as though he his making progress. He notes that using the electrical stimulation unit went well at home and he was able to use it without any issues. Following last session, he reports that he was sore, but that it felt good. Otherwise, no new reports.       Objective: See treatment diary below.      Assessment: Tolerated treatment well. Patient demonstrated improved tolerance to standing exercises with added weight. Patient able to perform mini squats, requiring verbal cues for foot placement. Performance of SAQ actively, was improved in today's session, with patient close to achieving terminal knee extension. Patient was unable to perform supine straight leg raise actively, however was able to perform active assisted using strap. Passive knee flexion range of motion measured at 97 degrees. Patient continues to demonstrate a lack of knee extension, encouraged to perform seated passive knee extension with weight at home to work towards achieving terminal knee extension. Patient educated to continue icing and elevation depending on noted soreness and swelling. Patient demonstrated fatigue post treatment, exhibited good technique with therapeutic exercises, and would benefit from continued PT.      Plan: Progress treatment as tolerated.       Precautions:   Patient Active Problem List   Diagnosis    Obstructive sleep apnea treated with continuous positive airway pressure (CPAP)    Obesity (BMI 35.0-39.9 without comorbidity)    Mixed hyperlipidemia    Gastroesophageal reflux disease  "   Hyperglycemia    Left knee pain    Chronic left shoulder pain    Primary osteoarthritis of left knee          Manuals 1/27 1/30 2/3 2/7 2/11        L Knee PROM BW  BW BW TE BW                                               Neuro Re-Ed             NMES nv 280 PD  85 F  10/50 CT  80 amp  10 min Symmetrical biphasic  480 PD  85 F  10/50 CT  80 amp  10 min NV    Completed w/ home TENS today HEP                                                                                      Ther Ex             Quad Sets c NMES 10\"x10 10 sec hold 5 min 10 sec hold 5 min 10 sec hold 5 min         Glute Sets 10\"x10            SAQ  10\"x10  10 sec hold 5 min 10 sec hold 5 min 10 sec hold 5 min 10\"x10        AA LAQ 10\"x10            Heel slides  10\"x10  10\"x10 10\"x10 10\"x10        AA Supine SLR c strap     5\"x20         Standing HR/TR 20x ea 20x ea 20x ea x20ea 1.5# 20x ea        TKE @ wall 10\"x10 10\"x10 10\"x10 5\"x15 5\"x15        Standing Hip ext, abd, flx 5\" x10 ea 5\" x15 ea 5\" x15 ea 5\"x15 ea 1.5#, 5\" 2x10        SL Abduction              Longsit hamstring stretch c strap     20\"x4        Minisquats      2x10        Seated TKE c weight     5 min         Ther Activity             Bike 5 revolutions 5 min fwd/bwd 5 min fwd/bwd 5 min fwd/bwd 5 min fwd/bwd                     Gait Training                                       Modalities                                                    Patient primarily treated by KI Draper with direct supervision throughout session by clinical instructor/staff therapist TIN JayT.   "

## 2025-02-13 ENCOUNTER — OFFICE VISIT (OUTPATIENT)
Dept: PHYSICAL THERAPY | Facility: REHABILITATION | Age: 60
End: 2025-02-13
Payer: COMMERCIAL

## 2025-02-13 DIAGNOSIS — M25.562 CHRONIC PAIN OF LEFT KNEE: ICD-10-CM

## 2025-02-13 DIAGNOSIS — G89.29 CHRONIC PAIN OF LEFT KNEE: ICD-10-CM

## 2025-02-13 DIAGNOSIS — M17.12 PRIMARY OSTEOARTHRITIS OF LEFT KNEE: Primary | ICD-10-CM

## 2025-02-13 PROCEDURE — 97110 THERAPEUTIC EXERCISES: CPT

## 2025-02-13 PROCEDURE — 97140 MANUAL THERAPY 1/> REGIONS: CPT

## 2025-02-13 PROCEDURE — 97530 THERAPEUTIC ACTIVITIES: CPT

## 2025-02-13 NOTE — PROGRESS NOTES
"Daily Note     Today's date: 2025  Patient name: Sg Gomez  : 1965  MRN: [id number]  Referring provider: Sherman Pierce,*  Dx:   Encounter Diagnosis     ICD-10-CM    1. Primary osteoarthritis of left knee  M17.12       2. Chronic pain of left knee  M25.562     G89.29                      Subjective: pt reports with c/o soreness after last visit, but feeling better today. He noted continued compliance with HEP and home NMES unit.      Objective: See treatment diary below      Assessment: pt tolerated treatment well. Good response with increased resistance with standing hip exercises bilaterally. Most challegned with SLR in flexion wich required manual assistance doing so. Pt advised to continue with current HEP and NMES 2x/day with the addition of propping L LE into extension to further promote increasing knee extension ROM. Pt agreed with plan. Patient demonstrated fatigue post treatment, exhibited good technique with therapeutic exercises, and would benefit from continued PT      Plan: Continue per plan of care.  Progress treatment as tolerated.       Precautions:   Patient Active Problem List   Diagnosis    Obstructive sleep apnea treated with continuous positive airway pressure (CPAP)    Obesity (BMI 35.0-39.9 without comorbidity)    Mixed hyperlipidemia    Gastroesophageal reflux disease    Hyperglycemia    Left knee pain    Chronic left shoulder pain    Primary osteoarthritis of left knee          Manuals 1/27 1/30 2/3 2/7 2/11 2/13       L Knee PROM BW  BW BW TE BW                                               Neuro Re-Ed             NMES nv 280 PD  85 F  10/50 CT  80 amp  10 min Symmetrical biphasic  480 PD  85 F  10/50 CT  80 amp  10 min NV    Completed w/ home TENS today HEP                                                                                      Ther Ex             Quad Sets c NMES 10\"x10 10 sec hold 5 min 10 sec hold 5 min 10 sec hold 5 min         Glute Sets 10\"x10    " "        SAQ  10\"x10  10 sec hold 5 min 10 sec hold 5 min 10 sec hold 5 min 10\"x10 10\"x10       AA LAQ 10\"x10            Heel slides  10\"x10  10\"x10 10\"x10 10\"x10 10\"x10       AA Supine SLR c strap     5\"x20  5\"x10 w/ manual AA       Standing HR/TR 20x ea 20x ea 20x ea x20ea 1.5# 20x ea 2# x20 ea       TKE @ wall 10\"x10 10\"x10 10\"x10 5\"x15 5\"x15 nv       Standing Hip ext, abd, flx 5\" x10 ea 5\" x15 ea 5\" x15 ea 5\"x15 ea 1.5#, 5\" 2x10 2# 5\"  2x10 B       SL Abduction              Longsit hamstring stretch c strap     20\"x4 20\"x4       Minisquats      2x10 2x10       Seated TKE c weight     5 min  HEP       Ther Activity             Bike 5 revolutions 5 min fwd/bwd 5 min fwd/bwd 5 min fwd/bwd 5 min fwd/bwd 5 min fwd/bwd                    Gait Training                                       Modalities                                                      "

## 2025-02-17 ENCOUNTER — OFFICE VISIT (OUTPATIENT)
Dept: PHYSICAL THERAPY | Facility: REHABILITATION | Age: 60
End: 2025-02-17
Payer: COMMERCIAL

## 2025-02-17 DIAGNOSIS — G89.29 CHRONIC PAIN OF LEFT KNEE: ICD-10-CM

## 2025-02-17 DIAGNOSIS — M17.12 PRIMARY OSTEOARTHRITIS OF LEFT KNEE: Primary | ICD-10-CM

## 2025-02-17 DIAGNOSIS — M25.562 CHRONIC PAIN OF LEFT KNEE: ICD-10-CM

## 2025-02-17 PROCEDURE — 97140 MANUAL THERAPY 1/> REGIONS: CPT | Performed by: PHYSICAL THERAPIST

## 2025-02-17 PROCEDURE — 97110 THERAPEUTIC EXERCISES: CPT | Performed by: PHYSICAL THERAPIST

## 2025-02-17 NOTE — PROGRESS NOTES
Daily Note     Today's date: 2025  Patient name: Sg Gomez  : 1965  MRN: [id number]  Referring provider: Sherman Pierce,*  Dx:   Encounter Diagnosis     ICD-10-CM    1. Primary osteoarthritis of left knee  M17.12       2. Chronic pain of left knee  M25.562     G89.29           Start Time: 0900  Stop Time: 0945  Total time in clinic (min): 45 minutes    Subjective: Pt reports feeling good today. He notes that he was sore following last session, but otherwise felt good. He notes that he forgot his cane at home, but was able to walk into the clinic without an AD.       Objective: See treatment diary below.    Assessment: Tolerated treatment well. Patient able to ambulate into and throughout clinic without an assistive device with good tolerance. Patient able to achieve a few rotations on the bike this session. Able to tolerate increased weight for standing hip exercises with proper form and control. Patient continues to demonstrate a lack of left knee extension, noted with SAQ. Required moderate manual assistance to perform supine SLR, with notable extension lag. Passive knee flexion measured at 98 degrees and knee extension measured at - 10 degrees. Patient demonstrated fatigue post treatment, exhibited good technique with therapeutic exercises, and would benefit from continued PT.      Plan: Progress treatment as tolerated.       Precautions:   Patient Active Problem List   Diagnosis    Obstructive sleep apnea treated with continuous positive airway pressure (CPAP)    Obesity (BMI 35.0-39.9 without comorbidity)    Mixed hyperlipidemia    Gastroesophageal reflux disease    Hyperglycemia    Left knee pain    Chronic left shoulder pain    Primary osteoarthritis of left knee          Manuals  2/3 2/7 2/11 2/13 2/17      L Knee PROM BW  BW BW TE BW  BW                                             Neuro Re-Ed             NMES nv 280 PD  85 F  10/50 CT  80 amp  10 min Symmetrical  "biphasic  480 PD  85 F  10/50 CT  80 amp  10 min NV    Completed w/ home TENS today HEP                                                                                      Ther Ex             Quad Sets c NMES 10\"x10 10 sec hold 5 min 10 sec hold 5 min 10 sec hold 5 min         Glute Sets 10\"x10            SAQ  10\"x10  10 sec hold 5 min 10 sec hold 5 min 10 sec hold 5 min 10\"x10 10\"x10 10\"x10      AA LAQ 10\"x10            Heel slides  10\"x10  10\"x10 10\"x10 10\"x10 10\"x10 10\"x15      AA Supine SLR c strap     5\"x20  5\"x10 w/ manual AA 5\"X10 w/ mod manual AA      Standing HR/TR 20x ea 20x ea 20x ea x20ea 1.5# 20x ea 2# x20 ea 3# x20 ea      TKE c TB 10\"x10 10\"x10 10\"x10 5\"x15 5\"x15 nv Blue, 5\"x15      Standing Hip ext, abd, flx 5\" x10 ea 5\" x15 ea 5\" x15 ea 5\"x15 ea 1.5#, 5\" 2x10 2# 5\"  2x10 B 3# 5\" 2x10 B      SL Abduction              Longsit hamstring stretch c strap     20\"x4 20\"x4 20\"x4       Minisquats      2x10 2x10 2x10      Seated TKE c weight     5 min  HEP       Ther Activity             Bike 5 revolutions 5 min fwd/bwd 5 min fwd/bwd 5 min fwd/bwd 5 min fwd/bwd 5 min fwd/bwd 5 min fwd/bwd                   Gait Training                                       Modalities                                                        Patient primarily treated by KI Draper with direct supervision throughout session by clinical instructor/staff therapist Mackenzie Mukherjee DPT.   "

## 2025-02-20 ENCOUNTER — OFFICE VISIT (OUTPATIENT)
Dept: PHYSICAL THERAPY | Facility: REHABILITATION | Age: 60
End: 2025-02-20
Payer: COMMERCIAL

## 2025-02-20 DIAGNOSIS — M17.12 PRIMARY OSTEOARTHRITIS OF LEFT KNEE: Primary | ICD-10-CM

## 2025-02-20 DIAGNOSIS — M25.562 CHRONIC PAIN OF LEFT KNEE: ICD-10-CM

## 2025-02-20 DIAGNOSIS — G89.29 CHRONIC PAIN OF LEFT KNEE: ICD-10-CM

## 2025-02-20 PROCEDURE — 97110 THERAPEUTIC EXERCISES: CPT | Performed by: PHYSICAL THERAPIST

## 2025-02-20 NOTE — PROGRESS NOTES
Daily Note     Today's date: 2025  Patient name: Sg Gomez  : 1965  MRN: [id number]  Referring provider: Sherman Pierce,*  Dx:   Encounter Diagnosis     ICD-10-CM    1. Primary osteoarthritis of left knee  M17.12       2. Chronic pain of left knee  M25.562     G89.29           Start Time: 0900  Stop Time: 0950  Total time in clinic (min): 50 minutes    Subjective: Pt reports feeling good today. Following last treatment session, he notes feeling sore in the knee. Additionally, he [place] going [place] the mall to walk and states that he takes his cane for safety, but isn't reliant on it to walk. He notes he's been attempting stairs and feels they have been going okay. Otherwise, no new changes.       Objective: See treatment diary below.      Assessment: Tolerated treatment well. Patient able to perform continuous revolutions on the bike today, with a more fluid movement pattern. Improved performance of mini squats with better distribution of weight between lower extremities and improved hip hinge. Improved performance of short arc quads with near full knee extension maintained, ankle weight was added with good tolerance and form. Added step ups with good tolerance. However, reduced eccentric control into left knee flexion when descending step was noted. Reduced left knee flexion range of motion when ascending step. Passive left knee flexion range of motion measured at 102 degrees. Patient demonstrated fatigue post treatment, exhibited good technique with therapeutic exercises, and would benefit from continued PT.      Plan: Progress treatment as tolerated.  Add knee flexion step stretch next session.      Precautions:   Patient Active Problem List   Diagnosis    Obstructive sleep apnea treated with continuous positive airway pressure (CPAP)    Obesity (BMI 35.0-39.9 without comorbidity)    Mixed hyperlipidemia    Gastroesophageal reflux disease    Hyperglycemia    Left knee pain    Chronic left  "shoulder pain    Primary osteoarthritis of left knee          Manuals 1/27 1/30 2/3 2/7 2/11 2/13 2/17 2/20     L Knee PROM BW  BW BW TE BW  BW BW                                            Neuro Re-Ed             NMES nv 280 PD  85 F  10/50 CT  80 amp  10 min Symmetrical biphasic  480 PD  85 F  10/50 CT  80 amp  10 min NV    Completed w/ home TENS today HEP                                                                                      Ther Ex             Quad Sets c NMES 10\"x10 10 sec hold 5 min 10 sec hold 5 min 10 sec hold 5 min         Glute Sets 10\"x10            SAQ  10\"x10  10 sec hold 5 min 10 sec hold 5 min 10 sec hold 5 min 10\"x10 10\"x10 10\"x10 2#, 10\"x10     AA LAQ 10\"x10            Heel slides  10\"x10  10\"x10 10\"x10 10\"x10 10\"x10 10\"x15      AA Supine SLR c strap     5\"x20  5\"x10 w/ manual AA 5\"X10 w/ mod manual AA nv     Standing HR/TR 20x ea 20x ea 20x ea x20ea 1.5# 20x ea 2# x20 ea 3# x20 ea 3# x20 ea     TKE c TB 10\"x10 10\"x10 10\"x10 5\"x15 5\"x15 nv Blue, 5\"x15 Blue, 5\"x15     Standing Hip ext, abd, flx 5\" x10 ea 5\" x15 ea 5\" x15 ea 5\"x15 ea 1.5#, 5\" 2x10 2# 5\"  2x10 B 3# 5\" 2x10 B 3# 5\" 2x10 B     SL Abduction              Longsit hamstring stretch c strap     20\"x4 20\"x4 20\"x4  20\"x4     Minisquats      2x10 2x10 2x10 2x10      Seated TKE c weight     5 min  HEP       Step Ups/Downs        1R 1x10     Knee flexion step stretch        nv     Ther Activity             Bike 5 revolutions 5 min fwd/bwd 5 min fwd/bwd 5 min fwd/bwd 5 min fwd/bwd 5 min fwd/bwd 5 min fwd/bwd 5 min fwd/bwd                  Gait Training                                       Modalities                                                          Patient primarily treated by KI Draper with direct supervision throughout session by clinical instructor/staff therapist Taz Arroyo DPT.   "

## 2025-02-21 ENCOUNTER — OFFICE VISIT (OUTPATIENT)
Dept: OBGYN CLINIC | Facility: MEDICAL CENTER | Age: 60
End: 2025-02-21
Payer: COMMERCIAL

## 2025-02-21 ENCOUNTER — APPOINTMENT (OUTPATIENT)
Dept: RADIOLOGY | Facility: MEDICAL CENTER | Age: 60
End: 2025-02-21
Payer: COMMERCIAL

## 2025-02-21 VITALS — BODY MASS INDEX: 34.33 KG/M2 | HEIGHT: 73 IN | WEIGHT: 259 LBS

## 2025-02-21 DIAGNOSIS — M25.421 PAIN AND SWELLING OF RIGHT ELBOW: ICD-10-CM

## 2025-02-21 DIAGNOSIS — M79.601 RIGHT ARM PAIN: Primary | ICD-10-CM

## 2025-02-21 DIAGNOSIS — M79.601 RIGHT ARM PAIN: ICD-10-CM

## 2025-02-21 DIAGNOSIS — M25.521 PAIN AND SWELLING OF RIGHT ELBOW: ICD-10-CM

## 2025-02-21 PROCEDURE — 99214 OFFICE O/P EST MOD 30 MIN: CPT | Performed by: ORTHOPAEDIC SURGERY

## 2025-02-21 PROCEDURE — 73080 X-RAY EXAM OF ELBOW: CPT

## 2025-02-21 NOTE — PROGRESS NOTES
"Orthopaedic Surgery - Office Note  Sg Gomez (59 y.o. male)   : 1965   MRN: [id number]  Encounter Date: 2025    Assessment / Plan    Proximal biceps tendon rupture 6 weeks ago  At this time biceps deformity seems proximal in nature rather than distal  Discussed proximal biceps ruptures are none surgical, distal biceps are surgical up to 2 months following injury  Proximal biceps he will notice 5% loss of strength but will not impair overall function, activity  He will like to get MRI of right elbow to make sure not distal biceps rupture  See back to review   Follow-up:  Return for after MRI of right elbow .      Chief Complaint / Date of Onset  Biceps pain  Injury Mechanism / Date  6 weeks ago  Surgery / Date  None    History of Present Illness   Sg Gomez is a 59 y.o. male who presents for new issues right distal biceps pain. He states around New Years he was lifting flat screen TV and felt a pop in biceps with acute pain/burning sensation. Next day onset of bruising with deformity. Pain has improved since initial injury, bruising, swelling resolved. He states no pain in shoulder or elbow today.     Treatment Summary  Medications / Modalities  Celebrex 200mg, acetaminophen 500mg, robaxin 500mg  Bracing / Immobilization  none  Physical Therapy  None  Injections  None  Prior Surgeries  None  Other Treatments  None    Employment / Current Status  Self employed    Sport / Organization / Current Status  none      Review of Systems  Pertinent items are noted in HPI.  All other systems were reviewed and are negative.      Physical Exam  Ht 6' 1\" (1.854 m)   Wt 117 kg (259 lb)   BMI 34.17 kg/m²   Cons: Appears well.  No apparent distress.  Psych: Alert. Oriented x3.  Mood and affect normal.  Eyes: PERRLA, EOMI  Resp: Normal effort.  No audible wheezing or stridor.  CV: Palpable pulse.  No discernable arrhythmia.  No LE edema.  Lymph:  No palpable cervical, axillary, or inguinal " "lymphadenopathy.  Skin: Warm.  No palpable masses.  No visible lesions.  Neuro: Normal muscle tone.  Normal and symmetric DTR's.     Right Shoulder Exam  Alignment / Posture:  Normal shoulder posture.  Inspection:  No swelling. No ecchymosis. ramses deformity.  Palpation:  No tenderness.  ROM:  full motion of shoulder  Strength:  supraspinatus 5/5, infraspinatus 5/5, subscap 5/5  Stability:  No objective shoulder instability.  Tests: (-) Crane. (-) Drop arm. (-) Belly press.  Neurovascular:  Sensation intact in Ax/R/M/U nerve distributions. 2+ radial pulse.     Mild tenderness distal biceps region      Studies Reviewed  XR of right elbow - no significant degenerative changes, no fractures, dislocation, no calcifications      Procedures  No procedures today.    Medical, Surgical, Family, and Social History  The patient's medical history, family history, and social history, were reviewed and updated as appropriate.    Past Medical History:   Diagnosis Date    CPAP (continuous positive airway pressure) dependence     GERD (gastroesophageal reflux disease)     High cholesterol     Knee pain, left     Obstructive sleep apnea     Last Assessed:6/8/16       Past Surgical History:   Procedure Laterality Date    COLONOSCOPY      KNEE SURGERY Bilateral     Bursa sacs removed bilaterally    GA ARTHRP KNE CONDYLE&PLATU MEDIAL&LAT COMPARTMENTS Left 1/20/2025    Procedure: ARTHROPLASTY KNEE TOTAL W ROBOT;  Surgeon: Sherman Pierce MD;  Location: [facility];  Service: Orthopedics    SURGERY SCROTAL / TESTICULAR      1/9/25 per pt as a child had to have his testicles dropped \"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 \"       Family History   Problem Relation Age of Onset    Hypertension Mother     Lung cancer Father     Anesthesia problems Son        Social History     Occupational History    Not on file   Tobacco Use    Smoking status: Never    Smokeless tobacco: Never    Tobacco comments:     No smoking per pt - denies any tobacco " use per pt    Vaping Use    Vaping status: Never Used   Substance and Sexual Activity    Alcohol use: Yes     Comment: Beer use - weekly    Drug use: No     Comment: Denies any drug use per pt    Sexual activity: Yes     Comment: Denies any chest pain or shortness of breath with activity       No Known Allergies      Current Outpatient Medications:     Acetaminophen Extra Strength 500 MG TABS, TAKE 2 TABLETS (1,000 MG TOTAL) BY MOUTH EVERY 6 (SIX) HOURS AS NEEDED FOR MILD PAIN, Disp: 90 tablet, Rfl: 0    atorvastatin (LIPITOR) 20 mg tablet, Take 1 tablet (20 mg total) by mouth daily, Disp: 90 tablet, Rfl: 1    celecoxib (CeleBREX) 200 mg capsule, Take 1 capsule (200 mg total) by mouth 2 (two) times a day, Disp: 60 capsule, Rfl: 2    Cholecalciferol (VITAMIN D-3 PO), Take by mouth in the morning, Disp: , Rfl:     HYDROcodone-acetaminophen (Norco) 5-325 mg per tablet, Take 1 tablet by mouth every 6 (six) hours as needed for pain Max Daily Amount: 4 tablets, Disp: 30 tablet, Rfl: 0    MAGNESIUM PO, Take by mouth in the morning, Disp: , Rfl:     methocarbamol (ROBAXIN) 500 mg tablet, TAKE 1 TABLET BY MOUTH 3 TIMES A DAY AS NEEDED FOR MUSCLE SPASMS., Disp: 60 tablet, Rfl: 0    Multiple Vitamins-Minerals (multivitamin with minerals) tablet, Take 1 tablet by mouth daily, Disp: 30 tablet, Rfl: 2    Multiple Vitamins-Minerals (ZINC PO), Take by mouth, Disp: , Rfl:     omeprazole (PriLOSEC) 20 mg delayed release capsule, Take 1 capsule (20 mg total) by mouth daily, Disp: 90 capsule, Rfl: 1    POTASSIUM PO, Take by mouth in the morning, Disp: , Rfl:     TURMERIC PO, Take by mouth in the morning, Disp: , Rfl:     enoxaparin (LOVENOX) 40 mg/0.4 mL, Inject 0.4 mL (40 mg total) under the skin daily for 28 days To start Post-Op (Patient not taking: Reported on 11/25/2024), Disp: 11.2 mL, Rfl: 0      Shadi Ledesma    Scribe Attestation      I,:  Shadi Ledesma am acting as a scribe while in the presence of the attending physician.:        I,:  Manuel Hawkins MD personally performed the services described in this documentation    as scribed in my presence.:

## 2025-02-25 ENCOUNTER — OFFICE VISIT (OUTPATIENT)
Dept: PHYSICAL THERAPY | Facility: REHABILITATION | Age: 60
End: 2025-02-25
Payer: COMMERCIAL

## 2025-02-25 DIAGNOSIS — M17.12 PRIMARY OSTEOARTHRITIS OF LEFT KNEE: Primary | ICD-10-CM

## 2025-02-25 DIAGNOSIS — G89.29 CHRONIC PAIN OF LEFT KNEE: ICD-10-CM

## 2025-02-25 DIAGNOSIS — M25.562 CHRONIC PAIN OF LEFT KNEE: ICD-10-CM

## 2025-02-25 PROCEDURE — 97110 THERAPEUTIC EXERCISES: CPT

## 2025-02-25 PROCEDURE — 97112 NEUROMUSCULAR REEDUCATION: CPT

## 2025-02-25 PROCEDURE — 97530 THERAPEUTIC ACTIVITIES: CPT

## 2025-02-25 PROCEDURE — 97140 MANUAL THERAPY 1/> REGIONS: CPT

## 2025-02-25 NOTE — PROGRESS NOTES
"Daily Note     Today's date: 2025  Patient name: Sg Gomez  : 1965  MRN: [id number]  Referring provider: Sherman Pierce,*  Dx:   Encounter Diagnosis     ICD-10-CM    1. Primary osteoarthritis of left knee  M17.12       2. Chronic pain of left knee  M25.562     G89.29                      Subjective: pt reports he feels like he is walking better, but still has tightness in his knee and difficulty sleeping at night time. He noted sleeping a little bit longer this morning than usual though.      Objective: See treatment diary below      Assessment: pt tolerated progressions and treatment well. Increased dosage with exercises as listed with good response. VC/TC's required with exercises to ensure correct form, particularly with mini squats. Pt favored R LE with squats and cues needed to correct. Overall, good carry over with exercises and good tolerance doing so. Quad strength appear sot be gradually improving. Patient demonstrated fatigue post treatment, exhibited good technique with therapeutic exercises, and would benefit from continued PT      Plan: Continue per plan of care.  Progress treatment as tolerated.       Precautions:   Patient Active Problem List   Diagnosis    Obstructive sleep apnea treated with continuous positive airway pressure (CPAP)    Obesity (BMI 35.0-39.9 without comorbidity)    Mixed hyperlipidemia    Gastroesophageal reflux disease    Hyperglycemia    Left knee pain    Chronic left shoulder pain    Primary osteoarthritis of left knee          Manuals  2/3 2/7 2/11 2/13 2/17 2/20 2/25    L Knee PROM BW  BW BW TE BW  BW BW TE                                           Neuro Re-Ed             NMES nv 280 PD  85 F  10/50 CT  80 amp  10 min Symmetrical biphasic  480 PD  85 F  10/50 CT  80 amp  10 min NV    Completed w/ home TENS today HEP                                                                                      Ther Ex             Quad Sets c NMES 10\"x10 " "10 sec hold 5 min 10 sec hold 5 min 10 sec hold 5 min         Glute Sets 10\"x10            SAQ  10\"x10  10 sec hold 5 min 10 sec hold 5 min 10 sec hold 5 min 10\"x10 10\"x10 10\"x10 2#, 10\"x10 LAQ 2#  5\"x10    AA LAQ 10\"x10            Heel slides  10\"x10  10\"x10 10\"x10 10\"x10 10\"x10 10\"x15      AA Supine SLR c strap     5\"x20  5\"x10 w/ manual AA 5\"X10 w/ mod manual AA nv nv    Standing HR/TR 20x ea 20x ea 20x ea x20ea 1.5# 20x ea 2# x20 ea 3# x20 ea 3# x20 ea 3# 5\" 2x10 B    TKE c TB 10\"x10 10\"x10 10\"x10 5\"x15 5\"x15 nv Blue, 5\"x15 Blue, 5\"x15 Blue 5\"x20    Standing Hip ext, abd, flx 5\" x10 ea 5\" x15 ea 5\" x15 ea 5\"x15 ea 1.5#, 5\" 2x10 2# 5\"  2x10 B 3# 5\" 2x10 B 3# 5\" 2x10 B 3# 5\" 2x10 B    SL Abduction              Longsit hamstring stretch c strap     20\"x4 20\"x4 20\"x4  20\"x4     Minisquats      2x10 2x10 2x10 2x10  2x10 vc/tc's    Seated TKE c weight     5 min  HEP       Step Ups/Downs        1R 1x10 1R 1x10    Knee flexion step stretch        nv Biodex 10\"x10    Ther Activity             Bike 5 revolutions 5 min fwd/bwd 5 min fwd/bwd 5 min fwd/bwd 5 min fwd/bwd 5 min fwd/bwd 5 min fwd/bwd 5 min fwd/bwd 5 min fwd/bwd                 Gait Training                                       Modalities                                                            "

## 2025-02-27 ENCOUNTER — OFFICE VISIT (OUTPATIENT)
Dept: PHYSICAL THERAPY | Facility: REHABILITATION | Age: 60
End: 2025-02-27
Payer: COMMERCIAL

## 2025-02-27 ENCOUNTER — HOSPITAL ENCOUNTER (OUTPATIENT)
Dept: RADIOLOGY | Facility: IMAGING CENTER | Age: 60
End: 2025-02-27
Payer: COMMERCIAL

## 2025-02-27 DIAGNOSIS — M25.421 PAIN AND SWELLING OF RIGHT ELBOW: ICD-10-CM

## 2025-02-27 DIAGNOSIS — M25.562 CHRONIC PAIN OF LEFT KNEE: ICD-10-CM

## 2025-02-27 DIAGNOSIS — G89.29 CHRONIC PAIN OF LEFT KNEE: ICD-10-CM

## 2025-02-27 DIAGNOSIS — M17.12 PRIMARY OSTEOARTHRITIS OF LEFT KNEE: Primary | ICD-10-CM

## 2025-02-27 DIAGNOSIS — M25.521 PAIN AND SWELLING OF RIGHT ELBOW: ICD-10-CM

## 2025-02-27 PROCEDURE — 73221 MRI JOINT UPR EXTREM W/O DYE: CPT

## 2025-02-27 PROCEDURE — 97110 THERAPEUTIC EXERCISES: CPT

## 2025-02-27 PROCEDURE — 97140 MANUAL THERAPY 1/> REGIONS: CPT

## 2025-02-27 PROCEDURE — 97112 NEUROMUSCULAR REEDUCATION: CPT

## 2025-02-27 NOTE — PROGRESS NOTES
"Daily Note     Today's date: 2025  Patient name: Sg Gomez  : 1965  MRN: [id number]  Referring provider: Sherman Pierce,*  Dx:   Encounter Diagnosis     ICD-10-CM    1. Primary osteoarthritis of left knee  M17.12       2. Chronic pain of left knee  M25.562     G89.29                      Subjective: pt reports his knee felt good with the warmer weather the last few days.      Objective: See treatment diary below      Assessment: Pt with good tolerance to treatment. Improved ROM and eccentric control with repetition of step downs and able to increase dosage on this date. Good tolerance to TE with occasional cues for technique and good carryover with correction. PROM progressing well with favorable response to both flexion and extension. Will continue to progress as able. Pt would benefit from continued skilled PT to improve current deficits and maximize function.    Plan: Continue per plan of care.  Progress treatment as tolerated.       Precautions:   Patient Active Problem List   Diagnosis    Obstructive sleep apnea treated with continuous positive airway pressure (CPAP)    Obesity (BMI 35.0-39.9 without comorbidity)    Mixed hyperlipidemia    Gastroesophageal reflux disease    Hyperglycemia    Left knee pain    Chronic left shoulder pain    Primary osteoarthritis of left knee          Manuals  2/3 2/7 2/11 2/13 2/17 2/20 2/25 2/27   L Knee PROM BW  BW BW TE BW  BW BW TE SE                                          Neuro Re-Ed             NMES nv 280 PD  85 F  10/50 CT  80 amp  10 min Symmetrical biphasic  480 PD  85 F  10/50 CT  80 amp  10 min NV    Completed w/ home TENS today HEP                                                                                      Ther Ex             Quad Sets c NMES 10\"x10 10 sec hold 5 min 10 sec hold 5 min 10 sec hold 5 min         Glute Sets 10\"x10            SAQ  10\"x10  10 sec hold 5 min 10 sec hold 5 min 10 sec hold 5 min 10\"x10 10\"x10 " "10\"x10 2#, 10\"x10 LAQ 2#  5\"x10 LAQ 2#  5\"  2x10   AA LAQ 10\"x10            Heel slides  10\"x10  10\"x10 10\"x10 10\"x10 10\"x10 10\"x15      AA Supine SLR c strap     5\"x20  5\"x10 w/ manual AA 5\"X10 w/ mod manual AA nv nv    Standing HR/TR 20x ea 20x ea 20x ea x20ea 1.5# 20x ea 2# x20 ea 3# x20 ea 3# x20 ea 3# 5\" 2x10 B 3#  5\"  2x10 B   TKE c TB 10\"x10 10\"x10 10\"x10 5\"x15 5\"x15 nv Blue, 5\"x15 Blue, 5\"x15 Blue 5\"x20 Blue  5\"x20   Standing Hip ext, abd, flx 5\" x10 ea 5\" x15 ea 5\" x15 ea 5\"x15 ea 1.5#, 5\" 2x10 2# 5\"  2x10 B 3# 5\" 2x10 B 3# 5\" 2x10 B 3# 5\" 2x10 B 3#  2x10 B   SL Abduction              Longsit hamstring stretch c strap     20\"x4 20\"x4 20\"x4  20\"x4     Minisquats      2x10 2x10 2x10 2x10  2x10 vc/tc's 2x10   Seated TKE c weight     5 min  HEP                    Step Ups/Downs        1R 1x10 1R 1x10 1R  2x10   Knee flexion step stretch        nv Biodex 10\"x10 Biodex  10\"x10   Ther Activity             Bike 5 revolutions 5 min fwd/bwd 5 min fwd/bwd 5 min fwd/bwd 5 min fwd/bwd 5 min fwd/bwd 5 min fwd/bwd 5 min fwd/bwd 5 min fwd/bwd 5min fwd/bwd                Gait Training                                       Modalities                                                            "

## 2025-03-04 ENCOUNTER — OFFICE VISIT (OUTPATIENT)
Dept: PHYSICAL THERAPY | Facility: REHABILITATION | Age: 60
End: 2025-03-04
Payer: COMMERCIAL

## 2025-03-04 ENCOUNTER — OFFICE VISIT (OUTPATIENT)
Dept: OBGYN CLINIC | Facility: HOSPITAL | Age: 60
End: 2025-03-04

## 2025-03-04 VITALS — BODY MASS INDEX: 34.59 KG/M2 | WEIGHT: 261 LBS | HEIGHT: 73 IN

## 2025-03-04 DIAGNOSIS — Z47.89 ORTHOPEDIC AFTERCARE: Primary | ICD-10-CM

## 2025-03-04 DIAGNOSIS — G89.29 CHRONIC PAIN OF LEFT KNEE: ICD-10-CM

## 2025-03-04 DIAGNOSIS — M25.562 CHRONIC PAIN OF LEFT KNEE: ICD-10-CM

## 2025-03-04 DIAGNOSIS — M17.12 PRIMARY OSTEOARTHRITIS OF LEFT KNEE: Primary | ICD-10-CM

## 2025-03-04 PROCEDURE — 97112 NEUROMUSCULAR REEDUCATION: CPT

## 2025-03-04 PROCEDURE — 99024 POSTOP FOLLOW-UP VISIT: CPT | Performed by: ORTHOPAEDIC SURGERY

## 2025-03-04 PROCEDURE — 97140 MANUAL THERAPY 1/> REGIONS: CPT

## 2025-03-04 PROCEDURE — 97110 THERAPEUTIC EXERCISES: CPT

## 2025-03-04 NOTE — PROGRESS NOTES
"Name: Sg Gomez      : 1965       MRN: [id number]   Encounter Provider: Sherman Pierce MD   Encounter Date: 25  Encounter department: [facility] [place]     ASSESSMENT & PLAN:  Assessment & Plan  Orthopedic aftercare  Continue physical therapy to restore mobility and power, and office follow-up in 6 weeks time with x-rays she was left knee upon arrival will be my final recommendation            To do next visit:  No follow-ups on file.  _____________________________________________________  CHIEF COMPLAINT:  Chief Complaint   Patient presents with    Left Knee - Post-op         SUBJECTIVE:  Sg Gomez is a 59 y.o. male who presents weeks following left total knee replacement.  He describes swelling in his left leg, feeling of slight lengthen of his left leg, and some medial sided proximal tibial pain          PAST MEDICAL HISTORY:  Past Medical History:   Diagnosis Date    CPAP (continuous positive airway pressure) dependence     GERD (gastroesophageal reflux disease)     High cholesterol     Knee pain, left     Obstructive sleep apnea     Last Assessed:16       PAST SURGICAL HISTORY:  Past Surgical History:   Procedure Laterality Date    COLONOSCOPY      KNEE SURGERY Bilateral     Bursa sacs removed bilaterally    MD ARTHRP KNE CONDYLE&PLATU MEDIAL&LAT COMPARTMENTS Left 2025    Procedure: ARTHROPLASTY KNEE TOTAL W ROBOT;  Surgeon: Sherman Pierce MD;  Location:  [facility] OR;  Service: Orthopedics    SURGERY SCROTAL / TESTICULAR      25 per pt as a child had to have his testicles dropped \"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 \"       FAMILY HISTORY:  Family History   Problem Relation Age of Onset    Hypertension Mother     Lung cancer Father     Anesthesia problems Son        SOCIAL HISTORY:  Social History     Tobacco Use    Smoking status: Never    Smokeless tobacco: Never    Tobacco comments:     No smoking per pt - denies any tobacco use per " pt    Vaping Use    Vaping status: Never Used   Substance Use Topics    Alcohol use: Yes     Comment: Beer use - weekly    Drug use: No     Comment: Denies any drug use per pt       MEDICATIONS:    Current Outpatient Medications:     Acetaminophen Extra Strength 500 MG TABS, TAKE 2 TABLETS (1,000 MG TOTAL) BY MOUTH EVERY 6 (SIX) HOURS AS NEEDED FOR MILD PAIN, Disp: 90 tablet, Rfl: 0    atorvastatin (LIPITOR) 20 mg tablet, Take 1 tablet (20 mg total) by mouth daily, Disp: 90 tablet, Rfl: 1    celecoxib (CeleBREX) 200 mg capsule, Take 1 capsule (200 mg total) by mouth 2 (two) times a day, Disp: 60 capsule, Rfl: 2    Cholecalciferol (VITAMIN D-3 PO), Take by mouth in the morning, Disp: , Rfl:     enoxaparin (LOVENOX) 40 mg/0.4 mL, Inject 0.4 mL (40 mg total) under the skin daily for 28 days To start Post-Op (Patient not taking: Reported on 11/25/2024), Disp: 11.2 mL, Rfl: 0    HYDROcodone-acetaminophen (Norco) 5-325 mg per tablet, Take 1 tablet by mouth every 6 (six) hours as needed for pain Max Daily Amount: 4 tablets, Disp: 30 tablet, Rfl: 0    MAGNESIUM PO, Take by mouth in the morning, Disp: , Rfl:     methocarbamol (ROBAXIN) 500 mg tablet, TAKE 1 TABLET BY MOUTH 3 TIMES A DAY AS NEEDED FOR MUSCLE SPASMS., Disp: 60 tablet, Rfl: 0    Multiple Vitamins-Minerals (multivitamin with minerals) tablet, Take 1 tablet by mouth daily, Disp: 30 tablet, Rfl: 2    Multiple Vitamins-Minerals (ZINC PO), Take by mouth, Disp: , Rfl:     omeprazole (PriLOSEC) 20 mg delayed release capsule, Take 1 capsule (20 mg total) by mouth daily, Disp: 90 capsule, Rfl: 1    POTASSIUM PO, Take by mouth in the morning, Disp: , Rfl:     TURMERIC PO, Take by mouth in the morning, Disp: , Rfl:     ALLERGIES:  No Known Allergies    LABS:  HgA1c:   Lab Results   Component Value Date    HGBA1C 5.6 12/27/2024     BMP:   Lab Results   Component Value Date    CALCIUM 8.9 01/21/2025    K 4.5 01/21/2025    CO2 28 01/21/2025     01/21/2025    BUN 14  "01/21/2025    CREATININE 0.81 01/21/2025     CBC: No components found for: \"CBC\"    _____________________________________________________  PHYSICAL EXAMINATION:  Vital signs: Ht 6' 1\" (1.854 m)   Wt 118 kg (261 lb)   BMI 34.43 kg/m²   General: No acute distress, awake and alert  Psychiatric: Mood and affect appear appropriate  HEENT: Trachea Midline, No torticollis, no apparent facial trauma  Cardiovascular: No audible murmurs; Extremities appear perfused  Pulmonary: No audible wheezing or stridor  Skin: No open lesions; see further details (if any) below    MUSCULOSKELETAL EXAMINATION:  Ortho Exam  Left knee is a healed anterior scar.  The knee is neutral its alignment.  The knee extends well flexes to 95 degrees.  There is no tenderness left calf        _____________________________________________________  STUDIES REVIEWED:  I personally reviewed the images obtained in office today and my independent interpretation is as follows:    None performed    PROCEDURES PERFORMED:  Procedures    None preformed       Sherman Pierce MD  "

## 2025-03-04 NOTE — ASSESSMENT & PLAN NOTE
Continue physical therapy to restore mobility and power, and office follow-up in 6 weeks time with x-rays she was left knee upon arrival will be my final recommendation

## 2025-03-04 NOTE — PROGRESS NOTES
"Daily Note     Today's date: 3/4/2025  Patient name: Sg Gomez  : 1965  MRN: [id number]  Referring provider: Sherman Pierce,*  Dx:   Encounter Diagnosis     ICD-10-CM    1. Primary osteoarthritis of left knee  M17.12       2. Chronic pain of left knee  M25.562     G89.29                      Subjective: Pt reports he is doing well overall, no changes since last session.       Objective: See treatment diary below      Assessment: Tolerated treatment well. Pt performed all exercises without issue, continue to progress to tolerance. Pt showed good form with exercises, minimal cueing for exercises throughout session. ROM continues to improve. Pt would benefit from continued focus on stretching, ROM, and strengthening exercises to improve overall function. Patient demonstrated fatigue post treatment, exhibited good technique with therapeutic exercises, and would benefit from continued PT      Plan: Continue per plan of care.      Precautions:   Patient Active Problem List   Diagnosis    Obstructive sleep apnea treated with continuous positive airway pressure (CPAP)    Obesity (BMI 35.0-39.9 without comorbidity)    Mixed hyperlipidemia    Gastroesophageal reflux disease    Hyperglycemia    Left knee pain    Chronic left shoulder pain    Primary osteoarthritis of left knee          Manuals  2/3 2/7 2/11 2/13 2/17 2/20 2/25 2/27 3/   L Knee PROM BW  BW BW TE BW  BW BW TE SE KM                                             Neuro Re-Ed              NMES nv 280 PD  85 F  10/50 CT  80 amp  10 min Symmetrical biphasic  480 PD  85 F  10/50 CT  80 amp  10 min NV    Completed w/ home TENS today HEP                                                                                             Ther Ex              Quad Sets c NMES 10\"x10 10 sec hold 5 min 10 sec hold 5 min 10 sec hold 5 min          Glute Sets 10\"x10             SAQ  10\"x10  10 sec hold 5 min 10 sec hold 5 min 10 sec hold 5 min 10\"x10 10\"x10 " "10\"x10 2#, 10\"x10 LAQ 2#  5\"x10 LAQ 2#  5\"  2x10 LAQ 2# 5\" 2x10   AA LAQ 10\"x10             Heel slides  10\"x10  10\"x10 10\"x10 10\"x10 10\"x10 10\"x15       AA Supine SLR c strap     5\"x20  5\"x10 w/ manual AA 5\"X10 w/ mod manual AA nv nv     Standing HR/TR 20x ea 20x ea 20x ea x20ea 1.5# 20x ea 2# x20 ea 3# x20 ea 3# x20 ea 3# 5\" 2x10 B 3#  5\"  2x10 B 3# 5\" 2x10 B   TKE c TB 10\"x10 10\"x10 10\"x10 5\"x15 5\"x15 nv Blue, 5\"x15 Blue, 5\"x15 Blue 5\"x20 Blue  5\"x20 nv   Standing Hip ext, abd, flx 5\" x10 ea 5\" x15 ea 5\" x15 ea 5\"x15 ea 1.5#, 5\" 2x10 2# 5\"  2x10 B 3# 5\" 2x10 B 3# 5\" 2x10 B 3# 5\" 2x10 B 3#  2x10 B 3# 2x10 B   SL Abduction               Longsit hamstring stretch c strap     20\"x4 20\"x4 20\"x4  20\"x4      Minisquats      2x10 2x10 2x10 2x10  2x10 vc/tc's 2x10 2x10   Seated TKE c weight     5 min  HEP                      Step Ups/Downs        1R 1x10 1R 1x10 1R  2x10 1R 2x10   Knee flexion step stretch        nv Biodex 10\"x10 Biodex  10\"x10 Biodex 10x10\"   Ther Activity              Bike 5 revolutions 5 min fwd/bwd 5 min fwd/bwd 5 min fwd/bwd 5 min fwd/bwd 5 min fwd/bwd 5 min fwd/bwd 5 min fwd/bwd 5 min fwd/bwd 5min fwd/bwd 5 min fwd/bwd                 Gait Training                                          Modalities                                                                 "

## 2025-03-06 ENCOUNTER — OFFICE VISIT (OUTPATIENT)
Dept: INTERNAL MEDICINE CLINIC | Facility: OTHER | Age: 60
End: 2025-03-06
Payer: COMMERCIAL

## 2025-03-06 ENCOUNTER — OFFICE VISIT (OUTPATIENT)
Dept: PHYSICAL THERAPY | Facility: REHABILITATION | Age: 60
End: 2025-03-06
Payer: COMMERCIAL

## 2025-03-06 VITALS
TEMPERATURE: 97.9 F | HEART RATE: 64 BPM | HEIGHT: 73 IN | DIASTOLIC BLOOD PRESSURE: 72 MMHG | BODY MASS INDEX: 34.85 KG/M2 | SYSTOLIC BLOOD PRESSURE: 138 MMHG | WEIGHT: 263 LBS

## 2025-03-06 DIAGNOSIS — M17.12 PRIMARY OSTEOARTHRITIS OF LEFT KNEE: Primary | ICD-10-CM

## 2025-03-06 DIAGNOSIS — G47.33 OBSTRUCTIVE SLEEP APNEA TREATED WITH CONTINUOUS POSITIVE AIRWAY PRESSURE (CPAP): ICD-10-CM

## 2025-03-06 DIAGNOSIS — G89.29 CHRONIC PAIN OF LEFT KNEE: ICD-10-CM

## 2025-03-06 DIAGNOSIS — E78.2 MIXED HYPERLIPIDEMIA: ICD-10-CM

## 2025-03-06 DIAGNOSIS — M25.562 CHRONIC PAIN OF LEFT KNEE: ICD-10-CM

## 2025-03-06 DIAGNOSIS — Z00.00 ANNUAL PHYSICAL EXAM: Primary | ICD-10-CM

## 2025-03-06 DIAGNOSIS — K21.9 GASTROESOPHAGEAL REFLUX DISEASE: ICD-10-CM

## 2025-03-06 DIAGNOSIS — E78.5 HYPERLIPIDEMIA, UNSPECIFIED HYPERLIPIDEMIA TYPE: ICD-10-CM

## 2025-03-06 PROCEDURE — 99214 OFFICE O/P EST MOD 30 MIN: CPT | Performed by: INTERNAL MEDICINE

## 2025-03-06 PROCEDURE — 97110 THERAPEUTIC EXERCISES: CPT | Performed by: PHYSICAL THERAPIST

## 2025-03-06 PROCEDURE — 99396 PREV VISIT EST AGE 40-64: CPT | Performed by: INTERNAL MEDICINE

## 2025-03-06 RX ORDER — ATORVASTATIN CALCIUM 20 MG/1
20 TABLET, FILM COATED ORAL DAILY
Qty: 90 TABLET | Refills: 1 | Status: SHIPPED | OUTPATIENT
Start: 2025-03-06

## 2025-03-06 RX ORDER — OMEPRAZOLE 20 MG/1
20 CAPSULE, DELAYED RELEASE ORAL DAILY
Qty: 90 CAPSULE | Refills: 1 | Status: SHIPPED | OUTPATIENT
Start: 2025-03-06

## 2025-03-06 NOTE — PROGRESS NOTES
Adult Annual Physical  Name: Sg Gomez      : 1965      MRN: [id number]  Encounter Provider: Kofi Colon MD  Encounter Date: 3/6/2025   Encounter department: [facility] PRIMARY [facility]    Assessment & Plan  Gastroesophageal reflux disease  Well on omeprazole 20 mg daily.  Orders:    omeprazole (PriLOSEC) 20 mg delayed release capsule; Take 1 capsule (20 mg total) by mouth daily    CBC; Future    Hyperlipidemia, unspecified hyperlipidemia type  Continue with atorvastatin 20 mg daily along with low-fat diet.  Will check lipid profile before next visit  Orders:    atorvastatin (LIPITOR) 20 mg tablet; Take 1 tablet (20 mg total) by mouth daily    Lipid Panel with Direct LDL reflex; Future    Mixed hyperlipidemia    Orders:    Lipid Panel with Direct LDL reflex; Future    Comprehensive metabolic panel; Future    Obstructive sleep apnea treated with continuous positive airway pressure (CPAP)  Doing well on CPAP machine.  Well compliance       Annual physical exam  Patient is a non-smoker  Occasional alcohol use  Lites for healthy diet and exercise  Up-to-date with PSA  Colorectal screening options discussed but not ready yet  Advised for healthy diet, exercise and weight loss  Mineralization reviewed and recommended Prevnar 20, shingle vaccine and flu shot but declined         Immunizations and preventive care screenings were discussed with patient today. Appropriate education was printed on patient's after visit summary.        Counseling:  Alcohol/drug use: discussed moderation in alcohol intake, the recommendations for healthy alcohol use, and avoidance of illicit drug use.      Depression Screening and Follow-up Plan: Patient was screened for depression during today's encounter. They screened negative with a PHQ-2 score of 0.          History of Present Illness     Adult Annual Physical:  Patient presents for annual physical.     Diet and Physical Activity:  - Diet/Nutrition:  "portion control and limited junk food.  - Exercise: no formal exercise.    Depression Screening:  - PHQ-2 Score: 0    General Health:  - Sleep: sleeps well. CPAP machine  - Hearing: normal hearing bilateral ears.  - Vision: wears glasses. does not see an eye doctor  - Dental: brushes teeth twice daily.     Health:  - History of STDs: no.   - Urinary symptoms: none.     Advanced Care Planning:  - Has an advanced directive?: no    - Has a durable medical POA?: no    - ACP document given to patient?: no      Review of Systems   Constitutional:  Negative for fatigue and fever.   HENT:  Negative for congestion, ear discharge, ear pain, postnasal drip, sinus pressure, sore throat, tinnitus and trouble swallowing.    Eyes:  Negative for discharge, itching and visual disturbance.   Respiratory:  Negative for cough and shortness of breath.    Cardiovascular:  Negative for chest pain and palpitations.   Gastrointestinal:  Negative for abdominal pain, diarrhea, nausea and vomiting.   Endocrine: Negative for cold intolerance and polyuria.   Genitourinary:  Negative for difficulty urinating, dysuria and urgency.   Musculoskeletal:  Positive for arthralgias. Negative for neck pain.   Skin:  Negative for rash.   Allergic/Immunologic: Negative for environmental allergies.   Neurological:  Negative for dizziness, weakness and headaches.   Psychiatric/Behavioral:  Negative for agitation. The patient is not nervous/anxious.          Objective   /72 (Patient Position: Sitting, Cuff Size: Adult)   Pulse 64   Temp 97.9 °F (36.6 °C) (Temporal)   Ht 6' 1\" (1.854 m)   Wt 119 kg (263 lb)   BMI 34.70 kg/m²     Physical Exam  Constitutional:       General: He is not in acute distress.     Appearance: He is well-developed. He is obese. He is not diaphoretic.   HENT:      Head: Normocephalic.      Right Ear: External ear normal. There is no impacted cerumen.      Left Ear: External ear normal. There is no impacted cerumen.      " Nose: No congestion or rhinorrhea.      Mouth/Throat:      Pharynx: No posterior oropharyngeal erythema.   Eyes:      General: No scleral icterus.     Pupils: Pupils are equal, round, and reactive to light.   Neck:      Thyroid: No thyromegaly.      Trachea: No tracheal deviation.   Cardiovascular:      Rate and Rhythm: Normal rate and regular rhythm.      Heart sounds: Normal heart sounds.   Pulmonary:      Effort: Pulmonary effort is normal. No respiratory distress.      Breath sounds: Normal breath sounds.   Chest:      Chest wall: No tenderness.   Abdominal:      General: Bowel sounds are normal.      Palpations: Abdomen is soft. There is no mass.      Tenderness: There is no abdominal tenderness.   Musculoskeletal:         General: Normal range of motion.      Cervical back: Normal range of motion and neck supple. No rigidity.      Right lower leg: No edema.      Left lower leg: Edema present.      Comments: Surgical scar over left knee is healing well   Lymphadenopathy:      Cervical: No cervical adenopathy.   Skin:     General: Skin is warm.      Findings: No erythema or rash.   Neurological:      Mental Status: He is alert and oriented to person, place, and time.      Cranial Nerves: No cranial nerve deficit.   Psychiatric:         Mood and Affect: Mood normal.         Behavior: Behavior normal.

## 2025-03-06 NOTE — PATIENT INSTRUCTIONS
"Patient Education     Routine physical for adults   The Basics   Written by the doctors and editors at [facility]   What is a physical? -- A physical is a routine visit, or \"check-up,\" with your doctor. You might also hear it called a \"wellness visit\" or \"preventive visit.\"  During each visit, the doctor will:   Ask about your physical and mental health   Ask about your habits, behaviors, and lifestyle   Do an exam   Give you vaccines if needed   Talk to you about any medicines you take   Give advice about your health   Answer your questions  Getting regular check-ups is an important part of taking care of your health. It can help your doctor find and treat any problems you have. But it's also important for preventing health problems.  A routine physical is different from a \"sick visit.\" A sick visit is when you see a doctor because of a health concern or problem. Since physicals are scheduled ahead of time, you can think about what you want to ask the doctor.  How often should I get a physical? -- It depends on your age and health. In general, for people age 21 years and older:   If you are younger than 50 years, you might be able to get a physical every 3 years.   If you are 50 years or older, your doctor might recommend a physical every year.  If you have an ongoing health condition, like diabetes or high blood pressure, your doctor will probably want to see you more often.  What happens during a physical? -- In general, each visit will include:   Physical exam - The doctor or nurse will check your height, weight, heart rate, and blood pressure. They will also look at your eyes and ears. They will ask about how you are feeling and whether you have any symptoms that bother you.   Medicines - It's a good idea to bring a list of all the medicines you take to each doctor visit. Your doctor will talk to you about your medicines and answer any questions. Tell them if you are having any side effects that bother you. You " "should also tell them if you are having trouble paying for any of your medicines.   Habits and behaviors - This includes:   Your diet   Your exercise habits   Whether you smoke, drink alcohol, or use drugs   Whether you are sexually active   Whether you feel safe at home  Your doctor will talk to you about things you can do to improve your health and lower your risk of health problems. They will also offer help and support. For example, if you want to quit smoking, they can give you advice and might prescribe medicines. If you want to improve your diet or get more physical activity, they can help you with this, too.   Lab tests, if needed - The tests you get will depend on your age and situation. For example, your doctor might want to check your:   Cholesterol   Blood sugar   Iron level   Vaccines - The recommended vaccines will depend on your age, health, and what vaccines you already had. Vaccines are very important because they can prevent certain serious or deadly infections.   Discussion of screening - \"Screening\" means checking for diseases or other health problems before they cause symptoms. Your doctor can recommend screening based on your age, risk, and preferences. This might include tests to check for:   Cancer, such as breast, prostate, cervical, ovarian, colorectal, prostate, lung, or skin cancer   Sexually transmitted infections, such as chlamydia and gonorrhea   Mental health conditions like depression and anxiety  Your doctor will talk to you about the different types of screening tests. They can help you decide which screenings to have. They can also explain what the results might mean.   Answering questions - The physical is a good time to ask the doctor or nurse questions about your health. If needed, they can refer you to other doctors or specialists, too.  Adults older than 65 years often need other care, too. As you get older, your doctor will talk to you about:   How to prevent falling at " home   Hearing or vision tests   Memory testing   How to take your medicines safely   Making sure that you have the help and support you need at home  All topics are updated as new evidence becomes available and our peer review process is complete.  This topic retrieved from [employer] on: May 02, 2024.  Topic 687340 Version 1.0  Release: 32.4.3 - C32.122  © 2024 UpToDate, Inc. and/or its affiliates. All rights reserved.  Consumer Information Use and Disclaimer   Disclaimer: This generalized information is a limited summary of diagnosis, treatment, and/or medication information. It is not meant to be comprehensive and should be used as a tool to help the user understand and/or assess potential diagnostic and treatment options. It does NOT include all information about conditions, treatments, medications, side effects, or risks that may apply to a specific patient. It is not intended to be medical advice or a substitute for the medical advice, diagnosis, or treatment of a health care provider based on the health care provider's examination and assessment of a patient's specific and unique circumstances. Patients must speak with a health care provider for complete information about their health, medical questions, and treatment options, including any risks or benefits regarding use of medications. This information does not endorse any treatments or medications as safe, effective, or approved for treating a specific patient. UpToDate, Inc. and its affiliates disclaim any warranty or liability relating to this information or the use thereof.The use of this information is governed by the Terms of Use, available at https://www.[employer]imedia[employer].com/en/know/clinical-effectiveness-terms. 2024© UpToDate, Inc. and its affiliates and/or licensors. All rights reserved.  Copyright   © 2024 UpToDate, Inc. and/or its affiliates. All rights reserved.

## 2025-03-06 NOTE — PROGRESS NOTES
"Daily Note     Today's date: 3/6/2025  Patient name: Sg Gomez  : 1965  MRN: [id number]  Referring provider: Sherman Pierce,*  Dx:   Encounter Diagnosis     ICD-10-CM    1. Primary osteoarthritis of left knee  M17.12       2. Chronic pain of left knee  M25.562     G89.29                      Subjective: Pt comes to therapy denying pain or discomfort. Denies discomfort following last treatment session. States he continues to have tightness in his knee, as well as difficulty with descending stairs. Notes he plans to return to work duties as of this coming Monday.       Objective: See treatment diary below      Assessment: Tolerated treatment well. Strong, yet appropriately reported, stretches noted with newly added knee flexion flexibility exercises. Patient exhibited good technique with therapeutic exercises and would benefit from continued PT      Plan: Progress treatment as tolerated.       Precautions:   Patient Active Problem List   Diagnosis    Obstructive sleep apnea treated with continuous positive airway pressure (CPAP)    Obesity (BMI 35.0-39.9 without comorbidity)    Mixed hyperlipidemia    Gastroesophageal reflux disease    Hyperglycemia    Left knee pain    Chronic left shoulder pain    Primary osteoarthritis of left knee          Manuals 3/6   2/13 2/17 2/20 2/25 2/27 3/   L Knee PROM JULIO CÉSAR    BW BW TE SE KM                                       Neuro Re-Ed            NMES                                                Ther Ex            SAQ     10\"x10 10\"x10 2#, 10\"x10      Heel slides     10\"x10 10\"x15       AA Supine SLR c strap    5\"x10 w/ manual AA 5\"X10 w/ mod manual AA nv nv     Standing HR/TR D/C   2# x20 ea 3# x20 ea 3# x20 ea 3# 5\" 2x10 B 3#  5\"  2x10 B 3# 5\" 2x10 B   TKE c TB    nv Blue, 5\"x15 Blue, 5\"x15 Blue 5\"x20 Blue  5\"x20 nv   Standing Hip ext, abd, flx D/C   2# 5\"  2x10 B 3# 5\" 2x10 B 3# 5\" 2x10 B 3# 5\" 2x10 B 3#  2x10 B 3# 2x10 B   Prone quad strap stretch 10\"x10      " "     SL Abduction  5\"x10           SLR - flex 5\"x10           Longsit hamstring stretch c strap    20\"x4 20\"x4  20\"x4      LAQ 7.5# x10      2# 5\" 2x10 2# 5\" 2x10 2# 5\" 2x10   Mini squats  2x10 c UE   2x10 2x10 2x10  2x10 vc/tc's 2x10 2x10   Step Ups/Downs 1R x10     1R 1x10 1R 1x10 1R  2x10 1R 2x10   Knee flexion step stretch Lo mat 10\"x10     nv Biodex 10\"x10 Biodex  10\"x10 Biodex 10x10\"   Ther Activity            Bike L1x6' fwd   5 min fwd/bwd 5 min fwd/bwd 5 min fwd/bwd 5 min fwd/bwd 5min fwd/bwd 5 min fwd/bwd               Gait Training                                    Modalities                                                             "

## 2025-03-06 NOTE — ASSESSMENT & PLAN NOTE
Well on omeprazole 20 mg daily.  Orders:    omeprazole (PriLOSEC) 20 mg delayed release capsule; Take 1 capsule (20 mg total) by mouth daily    CBC; Future

## 2025-03-07 ENCOUNTER — OFFICE VISIT (OUTPATIENT)
Dept: OBGYN CLINIC | Facility: MEDICAL CENTER | Age: 60
End: 2025-03-07
Payer: COMMERCIAL

## 2025-03-07 VITALS — BODY MASS INDEX: 34.85 KG/M2 | HEIGHT: 73 IN | WEIGHT: 263 LBS

## 2025-03-07 DIAGNOSIS — S46.211A RUPTURE OF RIGHT PROXIMAL BICEPS TENDON, INITIAL ENCOUNTER: Primary | ICD-10-CM

## 2025-03-07 PROCEDURE — 99214 OFFICE O/P EST MOD 30 MIN: CPT | Performed by: ORTHOPAEDIC SURGERY

## 2025-03-07 NOTE — PROGRESS NOTES
"Orthopaedic Surgery - Office Note  Sg Gomez (59 y.o. male)   : 1965   MRN: [id number]  Encounter Date: 3/7/2025    Assessment / Plan    Proximal biceps tendon rupture, right   We reviewed MRI of his elbow, which confirms no distal biceps rupture.   We re-iterated the treatment plan for the proximal biceps tendon rupture, with gradual return to activities as tolerated.  He plans to return to work on Monday.  Follow-up:  He can follow up as needed in the future      Chief Complaint / Date of Onset  Right biceps pain/2024  Injury Mechanism / Date  Lifting heavy item/2024  Surgery / Date  None    History of Present Illness   Sg Gomez is a 59 y.o. male who presents for follow up for right biceps pain. At last appointment, there was concern for proximal biceps tendon rupture. However, he was having pain and swelling at his anterior elbow and plan was to obtain right elbow MRI to rule out distal biceps rupture. He reports today that his symptoms are unchanged. His pain is present with lifting activities. He has been out of work.     Treatment Summary  Medications / Modalities  Celebrex  Bracing / Immobilization  None  Physical Therapy  None  Injections  None  Prior Surgeries  None  Other Treatments  None    Employment / Current Status  Self employed, construction    Sport / Organization / Current Status  N/A      Review of Systems  Pertinent items are noted in HPI.  All other systems were reviewed and are negative.      Physical Exam  Ht 6' 1\" (1.854 m)   Wt 119 kg (263 lb)   BMI 34.70 kg/m²   Cons: Appears well.  No apparent distress.  Psych: Alert. Oriented x3.  Mood and affect normal.  Eyes: PERRLA, EOMI  Resp: Normal effort.  No audible wheezing or stridor.  CV: Palpable pulse.  No discernable arrhythmia.  No LE edema.  Lymph:  No palpable cervical, axillary, or inguinal lymphadenopathy.  Skin: Warm.  No palpable masses.  No visible lesions.  Neuro: Normal muscle tone.  Normal " "and symmetric DTR's.     Right Arm Exam  Alignment:  Normal shoulder posture. Normal elbow alignment and carrying angle.  Inspection:  No swelling.  Palpation:  TTP over distal biceps tendon   ROM:  Normal shoulder ROM. Normal elbow ROM.  Strength:  5/5 biceps and triceps.  Stability:  No objective elbow instability.  Stable varus and valgus stress.  Tests:  No pertinent positive or negative tests.  Neurovascular:  Sensation intact in Ax/R/M/U nerve distributions. Fingers warm and perfused.       Studies Reviewed  MRI of right elbow - Distal biceps tendon with intra-substance edema, no complete tear present      Procedures  No procedures today.    Medical, Surgical, Family, and Social History  The patient's medical history, family history, and social history, were reviewed and updated as appropriate.    Past Medical History:   Diagnosis Date    CPAP (continuous positive airway pressure) dependence     GERD (gastroesophageal reflux disease)     High cholesterol     Knee pain, left     Obstructive sleep apnea     Last Assessed:6/8/16       Past Surgical History:   Procedure Laterality Date    COLONOSCOPY      KNEE SURGERY Bilateral     Bursa sacs removed bilaterally    OR ARTHRP KNE CONDYLE&PLATU MEDIAL&LAT COMPARTMENTS Left 1/20/2025    Procedure: ARTHROPLASTY KNEE TOTAL W ROBOT;  Surgeon: Sherman Pierce MD;  Location: [facility];  Service: Orthopedics    SURGERY SCROTAL / TESTICULAR      1/9/25 per pt as a child had to have his testicles dropped \"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 \"       Family History   Problem Relation Age of Onset    Hypertension Mother     Lung cancer Father     Anesthesia problems Son        Social History     Occupational History    Not on file   Tobacco Use    Smoking status: Never    Smokeless tobacco: Never    Tobacco comments:     No smoking per pt - denies any tobacco use per pt    Vaping Use    Vaping status: Never Used   Substance and Sexual Activity    Alcohol use: Yes     Comment: " Beer use - weekly    Drug use: No     Comment: Denies any drug use per pt    Sexual activity: Yes     Comment: Denies any chest pain or shortness of breath with activity       No Known Allergies      Current Outpatient Medications:     Acetaminophen Extra Strength 500 MG TABS, TAKE 2 TABLETS (1,000 MG TOTAL) BY MOUTH EVERY 6 (SIX) HOURS AS NEEDED FOR MILD PAIN, Disp: 90 tablet, Rfl: 0    atorvastatin (LIPITOR) 20 mg tablet, Take 1 tablet (20 mg total) by mouth daily, Disp: 90 tablet, Rfl: 1    celecoxib (CeleBREX) 200 mg capsule, Take 1 capsule (200 mg total) by mouth 2 (two) times a day, Disp: 60 capsule, Rfl: 2    Cholecalciferol (VITAMIN D-3 PO), Take by mouth in the morning, Disp: , Rfl:     MAGNESIUM PO, Take by mouth in the morning, Disp: , Rfl:     Multiple Vitamins-Minerals (multivitamin with minerals) tablet, Take 1 tablet by mouth daily, Disp: 30 tablet, Rfl: 2    Multiple Vitamins-Minerals (ZINC PO), Take by mouth, Disp: , Rfl:     omeprazole (PriLOSEC) 20 mg delayed release capsule, Take 1 capsule (20 mg total) by mouth daily, Disp: 90 capsule, Rfl: 1    POTASSIUM PO, Take by mouth in the morning, Disp: , Rfl:     TURMERIC PO, Take by mouth in the morning, Disp: , Rfl:       Nicole Coy MD    Scribe Attestation      I,:  Nicole Coy MD am acting as a scribe while in the presence of the attending physician.:       I,:  Manuel Hawkins MD personally performed the services described in this documentation    as scribed in my presence.:

## 2025-03-11 ENCOUNTER — OFFICE VISIT (OUTPATIENT)
Dept: PHYSICAL THERAPY | Facility: REHABILITATION | Age: 60
End: 2025-03-11
Payer: COMMERCIAL

## 2025-03-11 DIAGNOSIS — G89.29 CHRONIC PAIN OF LEFT KNEE: ICD-10-CM

## 2025-03-11 DIAGNOSIS — M25.562 CHRONIC PAIN OF LEFT KNEE: ICD-10-CM

## 2025-03-11 DIAGNOSIS — M17.12 PRIMARY OSTEOARTHRITIS OF LEFT KNEE: Primary | ICD-10-CM

## 2025-03-11 PROCEDURE — 97110 THERAPEUTIC EXERCISES: CPT | Performed by: PHYSICAL THERAPIST

## 2025-03-11 NOTE — PROGRESS NOTES
"Daily Note     Today's date: 3/11/2025  Patient name: Sg Gomez  : 1965  MRN: [id number]  Referring provider: Sherman Pierce,*  Dx:   Encounter Diagnosis     ICD-10-CM    1. Primary osteoarthritis of left knee  M17.12       2. Chronic pain of left knee  M25.562     G89.29                      Subjective: Pt comes to therapy denying pain or discomfort. Denies discomfort following last treatment session. 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       Objective: See treatment diary below      Assessment: Tolerated treatment well. Exhibited good challenge with progressions of PREs. Strong stretch with manuals reported, and appropriate stretches noted with seated knee flex today. Patient exhibited good technique with therapeutic exercises and would benefit from continued PT      Plan: Progress treatment as tolerated.       Precautions:   Patient Active Problem List   Diagnosis    Obstructive sleep apnea treated with continuous positive airway pressure (CPAP)    Obesity (BMI 35.0-39.9 without comorbidity)    Mixed hyperlipidemia    Gastroesophageal reflux disease    Hyperglycemia    Left knee pain    Chronic left shoulder pain    Primary osteoarthritis of left knee          Manuals 3/6 3/11  2/13 2/17 2/20 2/25 2/27 3/   L Knee PROM JULIO CÉSAR JULIO CÉSAR   BW BW TE SE KM                                       Neuro Re-Ed            NMES                                                Ther Ex            SAQ     10\"x10 10\"x10 2#, 10\"x10      Heel slides     10\"x10 10\"x15       AA Supine SLR c strap    5\"x10 w/ manual AA 5\"X10 w/ mod manual AA nv nv     Standing HR/TR D/C   2# x20 ea 3# x20 ea 3# x20 ea 3# 5\" 2x10 B 3#  5\"  2x10 B 3# 5\" 2x10 B   TKE c TB    nv Blue, 5\"x15 Blue, 5\"x15 Blue 5\"x20 Blue  5\"x20 nv   Standing Hip ext, abd, flx D/C   2# 5\"  2x10 B 3# 5\" 2x10 B 3# 5\" 2x10 B 3# 5\" 2x10 B 3#  2x10 B 3# 2x10 B   Prone quad strap stretch 10\"x10 " "30\"x3          SL Abduction  5\"x10 5\"x10          SLR - flex 5\"x10 5\"x10          Longsit hamstring stretch c strap    20\"x4 20\"x4  20\"x4      LAQ 7.5# x10 44# x10 ecc L     2# 5\" 2x10 2# 5\" 2x10 2# 5\" 2x10   SA leg curl  44# x10 ecc L          Mini squats  2x10 c UE 2x10 c UE  2x10 2x10 2x10  2x10 vc/tc's 2x10 2x10   Step Ups/Downs 1R x10 1R x10    1R 1x10 1R 1x10 1R  2x10 1R 2x10   Knee flexion step stretch Lo mat 10\"x10 nv    nv Biodex 10\"x10 Biodex  10\"x10 Biodex 10x10\"   Ther Activity            Bike L1x6' fwd L1x6'  5 min fwd/bwd 5 min fwd/bwd 5 min fwd/bwd 5 min fwd/bwd 5min fwd/bwd 5 min fwd/bwd               Gait Training                                    Modalities                                                               "

## 2025-03-13 ENCOUNTER — OFFICE VISIT (OUTPATIENT)
Dept: PHYSICAL THERAPY | Facility: REHABILITATION | Age: 60
End: 2025-03-13
Payer: COMMERCIAL

## 2025-03-13 DIAGNOSIS — G89.29 CHRONIC PAIN OF LEFT KNEE: ICD-10-CM

## 2025-03-13 DIAGNOSIS — Z96.652 AFTERCARE FOLLOWING LEFT KNEE JOINT REPLACEMENT SURGERY: ICD-10-CM

## 2025-03-13 DIAGNOSIS — Z47.1 AFTERCARE FOLLOWING LEFT KNEE JOINT REPLACEMENT SURGERY: ICD-10-CM

## 2025-03-13 DIAGNOSIS — M17.12 PRIMARY OSTEOARTHRITIS OF LEFT KNEE: Primary | ICD-10-CM

## 2025-03-13 DIAGNOSIS — M25.562 CHRONIC PAIN OF LEFT KNEE: ICD-10-CM

## 2025-03-13 PROCEDURE — 97110 THERAPEUTIC EXERCISES: CPT | Performed by: PHYSICAL THERAPIST

## 2025-03-13 NOTE — PROGRESS NOTES
"Daily Note     Today's date: 3/13/2025  Patient name: Sg Gomez  : 1965  MRN: [id number]  Referring provider: Sherman Pierce,*  Dx:   Encounter Diagnosis     ICD-10-CM    1. Primary osteoarthritis of left knee  M17.12       2. Chronic pain of left knee  M25.562     G89.29       3. Aftercare following left knee joint replacement surgery  Z47.1     Z96.652                      Subjective: Pt comes to therapy reporting soreness in knee following last therapy session, combined with work duties, as expected.       Objective: See treatment diary below      Assessment: Tolerated treatment well. Good challenges appreciated with weight machines and SLR. Continued hypomobility into flex and ext. Advised patient in using TERT principle at home to improve mobility through long duration, light stretches. Patient demonstrated fatigue post treatment, exhibited good technique with therapeutic exercises, and would benefit from continued PT      Plan: Progress treatment as tolerated.       Precautions:   Patient Active Problem List   Diagnosis    Obstructive sleep apnea treated with continuous positive airway pressure (CPAP)    Obesity (BMI 35.0-39.9 without comorbidity)    Mixed hyperlipidemia    Gastroesophageal reflux disease    Hyperglycemia    Left knee pain    Chronic left shoulder pain    Primary osteoarthritis of left knee          Manuals 3/6 3/11 3/13    2/25 2/27 3/4   L Knee PROM JULIO CÉSAR JULIO CÉSAR JULIO CÉSAR    TE SE KM                                       Neuro Re-Ed                                                            Ther Ex            Standing HR/TR D/C      3# 5\" 2x10 B 3#  5\"  2x10 B 3# 5\" 2x10 B   TKE c TB       Blue 5\"x20 Blue  5\"x20 nv   Standing Hip ext, abd, flx D/C      3# 5\" 2x10 B 3#  2x10 B 3# 2x10 B   Prone quad strap stretch 10\"x10 30\"x3 np         SL Abduction  5\"x10 5\"x10 np         SLR - flex 5\"x10 5\"x10 5\"x10         Leg press   88# 2x10         LAQ 7.5# x10 44# x10 ecc L 55# x10   66# x10  ecc " "L    2# 5\" 2x10 2# 5\" 2x10 2# 5\" 2x10   SA leg curl  44# x10 ecc L 44# x10   55# x10  ecc L         Mini squats  2x10 c UE 2x10 c UE 2x10 s UE    2x10 vc/tc's 2x10 2x10   Step Ups/Downs 1R x10 1R x10 2R x10    1R 1x10 1R  2x10 1R 2x10   Knee flexion step stretch Lo mat 10\"x10 nv     Biodex 10\"x10 Biodex  10\"x10 Biodex 10x10\"   Ther Activity            Bike L1x6' fwd L1x6' L1x10'    5 min fwd/bwd 5min fwd/bwd 5 min fwd/bwd               Gait Training                                    Modalities                                                                 "

## 2025-03-18 ENCOUNTER — OFFICE VISIT (OUTPATIENT)
Dept: PHYSICAL THERAPY | Facility: REHABILITATION | Age: 60
End: 2025-03-18
Payer: COMMERCIAL

## 2025-03-18 DIAGNOSIS — Z96.652 AFTERCARE FOLLOWING LEFT KNEE JOINT REPLACEMENT SURGERY: ICD-10-CM

## 2025-03-18 DIAGNOSIS — M17.12 PRIMARY OSTEOARTHRITIS OF LEFT KNEE: Primary | ICD-10-CM

## 2025-03-18 DIAGNOSIS — G89.29 CHRONIC PAIN OF LEFT KNEE: ICD-10-CM

## 2025-03-18 DIAGNOSIS — Z47.1 AFTERCARE FOLLOWING LEFT KNEE JOINT REPLACEMENT SURGERY: ICD-10-CM

## 2025-03-18 DIAGNOSIS — M25.562 CHRONIC PAIN OF LEFT KNEE: ICD-10-CM

## 2025-03-18 PROCEDURE — 97110 THERAPEUTIC EXERCISES: CPT | Performed by: PHYSICAL THERAPIST

## 2025-03-18 PROCEDURE — 97112 NEUROMUSCULAR REEDUCATION: CPT | Performed by: PHYSICAL THERAPIST

## 2025-03-18 NOTE — PROGRESS NOTES
"Daily Note     Today's date: 3/18/2025  Patient name: Sg Gomez  : 1965  MRN: [id number]  Referring provider: Sherman Pierce,*  Dx:   Encounter Diagnosis     ICD-10-CM    1. Primary osteoarthritis of left knee  M17.12       2. Aftercare following left knee joint replacement surgery  Z47.1     Z96.652       3. Chronic pain of left knee  M25.562     G89.29                      Subjective: Pt comes to therapy reporting continued stiffness and soreness in his left knee. States he has been performing self stretches with TERT principle in mind - propping leg between two chairs with 2x 10# weights      Objective: See treatment diary below      Assessment: Tolerated treatment well. Good challenge observed and reported with progressive strengthening activities. Emphasis on eccentric control with quads today. Advised patient in reducing intensity of home stretches if soreness is not dissipating between bouts. Patient exhibited good technique with therapeutic exercises and would benefit from continued PT      Plan: Progress treatment as tolerated.       Precautions:   Patient Active Problem List   Diagnosis    Obstructive sleep apnea treated with continuous positive airway pressure (CPAP)    Obesity (BMI 35.0-39.9 without comorbidity)    Mixed hyperlipidemia    Gastroesophageal reflux disease    Hyperglycemia    Left knee pain    Chronic left shoulder pain    Primary osteoarthritis of left knee          Manuals 3/6 3/11 3/13 3/18   2/25 2/27 3/   L Knee PROM JULIO CÉSAR JULIO CÉSAR JULIO CÉSAR JULIO CSÉAR   TE SE KM                                       Neuro Re-Ed                                                            Ther Ex            Standing HR/TR D/C      3# 5\" 2x10 B 3#  5\"  2x10 B 3# 5\" 2x10 B   TKE c TB       Blue 5\"x20 Blue  5\"x20 nv   Standing Hip ext, abd, flx D/C      3# 5\" 2x10 B 3#  2x10 B 3# 2x10 B   Prone quad strap stretch 10\"x10 30\"x3 np np        SL Abduction  5\"x10 5\"x10 np np        SLR - flex 5\"x10 5\"x10 5\"x10 " "5\"x10        Leg press   88# 2x10 110# x10  132# x10        LAQ 7.5# x10 44# x10 ecc L 55# x10   66# x10  ecc L 66# 2x10 ecc L   2# 5\" 2x10 2# 5\" 2x10 2# 5\" 2x10   SA leg curl  44# x10 ecc L 44# x10   55# x10  ecc L 66# 1x10 ecc L        Mini squats  2x10 c UE 2x10 c UE 2x10 s UE 2x10 s UE   2x10 vc/tc's 2x10 2x10   Step Ups/Downs 1R x10 1R x10 2R x10 Lat down 1R x15   1R 1x10 1R  2x10 1R 2x10   Knee flexion step stretch Lo mat 10\"x10 nv     Biodex 10\"x10 Biodex  10\"x10 Biodex 10x10\"   Lunge stationary      C UE   x5 B                    Ther Activity            Bike L1x6' fwd L1x6' L1x10' L1x10'   5 min fwd/bwd 5min fwd/bwd 5 min fwd/bwd               Gait Training                                    Modalities                                                                   "

## 2025-03-20 ENCOUNTER — OFFICE VISIT (OUTPATIENT)
Dept: PHYSICAL THERAPY | Facility: REHABILITATION | Age: 60
End: 2025-03-20
Payer: COMMERCIAL

## 2025-03-20 DIAGNOSIS — M17.12 PRIMARY OSTEOARTHRITIS OF LEFT KNEE: Primary | ICD-10-CM

## 2025-03-20 DIAGNOSIS — Z47.1 AFTERCARE FOLLOWING LEFT KNEE JOINT REPLACEMENT SURGERY: ICD-10-CM

## 2025-03-20 DIAGNOSIS — Z96.652 AFTERCARE FOLLOWING LEFT KNEE JOINT REPLACEMENT SURGERY: ICD-10-CM

## 2025-03-20 DIAGNOSIS — M25.562 CHRONIC PAIN OF LEFT KNEE: ICD-10-CM

## 2025-03-20 DIAGNOSIS — G89.29 CHRONIC PAIN OF LEFT KNEE: ICD-10-CM

## 2025-03-20 PROCEDURE — 97140 MANUAL THERAPY 1/> REGIONS: CPT

## 2025-03-20 PROCEDURE — 97530 THERAPEUTIC ACTIVITIES: CPT

## 2025-03-20 PROCEDURE — 97110 THERAPEUTIC EXERCISES: CPT

## 2025-03-20 NOTE — PROGRESS NOTES
"Daily Note     Today's date: 3/20/2025  Patient name: Sg Gomez  : 1965  MRN: [id number]  Referring provider: Sherman Pierce,*  Dx:   Encounter Diagnosis     ICD-10-CM    1. Primary osteoarthritis of left knee  M17.12       2. Aftercare following left knee joint replacement surgery  Z47.1     Z96.652       3. Chronic pain of left knee  M25.562     G89.29                      Subjective: Pt reports having more pain at night time which makes it difficult to sleep.      Objective: See treatment diary below      Assessment: Pt tolerated treatment well. Good challenge observed and reported with progressive strengthening activities. Emphasis on eccentric control with quads today. Advised patient again in reducing intensity of home stretches if soreness is not dissipating between bouts. Patient exhibited good technique with therapeutic exercises and would benefit from continued PT      Plan: Progress treatment as tolerated.       Precautions:   Patient Active Problem List   Diagnosis    Obstructive sleep apnea treated with continuous positive airway pressure (CPAP)    Obesity (BMI 35.0-39.9 without comorbidity)    Mixed hyperlipidemia    Gastroesophageal reflux disease    Hyperglycemia    Left knee pain    Chronic left shoulder pain    Primary osteoarthritis of left knee          Manuals 3/6 3/11 3/13 3/18 3/20  2/25 2/27 3/4   L Knee PROM JULIO CÉSAR JULIO CÉSAR JULIO CÉSAR JULIO CÉSAR TE  TE SE KM                                       Neuro Re-Ed                                                            Ther Ex            Standing HR/TR D/C      3# 5\" 2x10 B 3#  5\"  2x10 B 3# 5\" 2x10 B   TKE c TB       Blue 5\"x20 Blue  5\"x20 nv   Standing Hip ext, abd, flx D/C      3# 5\" 2x10 B 3#  2x10 B 3# 2x10 B   Prone quad strap stretch 10\"x10 30\"x3 np np        SL Abduction  5\"x10 5\"x10 np np        SLR - flex 5\"x10 5\"x10 5\"x10 5\"x10 5\"x10       Leg press   88# 2x10 110# x10  132# x10 132# 2x10       LAQ 7.5# x10 44# x10 ecc L 55# x10   66# " "x10  ecc L 66# 2x10 ecc L 66# 2x10 ecc L  2# 5\" 2x10 2# 5\" 2x10 2# 5\" 2x10   SA leg curl  44# x10 ecc L 44# x10   55# x10  ecc L 66# 1x10 ecc L 66# 1x10 ecc L       Mini squats  2x10 c UE 2x10 c UE 2x10 s UE 2x10 s UE 2x10 s UE  2x10 vc/tc's 2x10 2x10   Step Ups/Downs 1R x10 1R x10 2R x10 Lat down 1R x15 Lat down 1R x15  1R 1x10 1R  2x10 1R 2x10   Knee flexion step stretch Lo mat 10\"x10 nv     Biodex 10\"x10 Biodex  10\"x10 Biodex 10x10\"   Lunge stationary      C UE   x5 B C UE  X5 B                   Ther Activity            Bike L1x6' fwd L1x6' L1x10' L1x10' L1x10'  5 min fwd/bwd 5min fwd/bwd 5 min fwd/bwd               Gait Training                                    Modalities                                                                   "

## 2025-03-25 ENCOUNTER — OFFICE VISIT (OUTPATIENT)
Dept: PHYSICAL THERAPY | Facility: REHABILITATION | Age: 60
End: 2025-03-25
Payer: COMMERCIAL

## 2025-03-25 DIAGNOSIS — G89.29 CHRONIC PAIN OF LEFT KNEE: ICD-10-CM

## 2025-03-25 DIAGNOSIS — M25.562 CHRONIC PAIN OF LEFT KNEE: ICD-10-CM

## 2025-03-25 DIAGNOSIS — Z96.652 AFTERCARE FOLLOWING LEFT KNEE JOINT REPLACEMENT SURGERY: ICD-10-CM

## 2025-03-25 DIAGNOSIS — M17.12 PRIMARY OSTEOARTHRITIS OF LEFT KNEE: Primary | ICD-10-CM

## 2025-03-25 DIAGNOSIS — Z47.1 AFTERCARE FOLLOWING LEFT KNEE JOINT REPLACEMENT SURGERY: ICD-10-CM

## 2025-03-25 PROCEDURE — 97110 THERAPEUTIC EXERCISES: CPT | Performed by: PHYSICAL THERAPIST

## 2025-03-25 PROCEDURE — 97140 MANUAL THERAPY 1/> REGIONS: CPT | Performed by: PHYSICAL THERAPIST

## 2025-03-25 NOTE — PROGRESS NOTES
"Daily Note     Today's date: 3/25/2025  Patient name: Sg Gomez  : 1965  MRN: [id number]  Referring provider: Sherman Pierce,*  Dx:   Encounter Diagnosis     ICD-10-CM    1. Primary osteoarthritis of left knee  M17.12       2. Aftercare following left knee joint replacement surgery  Z47.1     Z96.652       3. Chronic pain of left knee  M25.562     G89.29                      Subjective: Patient stated moderate stiffness in his knee prior to treatment session.       Objective: See treatment diary below  PROM -5-107 degrees in supine    Assessment: Patient demonstrated moderate pain with manual PROM; no c/o at completion of treatment session.       Plan: Progress treatment as tolerated.       Precautions:   Patient Active Problem List   Diagnosis    Obstructive sleep apnea treated with continuous positive airway pressure (CPAP)    Obesity (BMI 35.0-39.9 without comorbidity)    Mixed hyperlipidemia    Gastroesophageal reflux disease    Hyperglycemia    Left knee pain    Chronic left shoulder pain    Primary osteoarthritis of left knee          Manuals 3/6 3/11 3/13 3/18 3/20 3/25 2/25 2/27 3   L Knee PROM JULIO CÉSAR JULIO CÉSAR JULIO CÉSAR JULIO CÉSAR TE KK TE SE KM                                       Neuro Re-Ed                                                            Ther Ex            Standing HR/TR D/C      3# 5\" 2x10 B 3#  5\"  2x10 B 3# 5\" 2x10 B   TKE c TB       Blue 5\"x20 Blue  5\"x20 nv   Standing Hip ext, abd, flx D/C      3# 5\" 2x10 B 3#  2x10 B 3# 2x10 B   Prone quad strap stretch 10\"x10 30\"x3 np np        SL Abduction  5\"x10 5\"x10 np np        SLR - flex 5\"x10 5\"x10 5\"x10 5\"x10 5\"x10 5\"x10      Leg press   88# 2x10 110# x10  132# x10 132# 2x10 132# 2x10      LAQ 7.5# x10 44# x10 ecc L 55# x10   66# x10  ecc L 66# 2x10 ecc L 66# 2x10 ecc L 66# 2x10 ecc L 2# 5\" 2x10 2# 5\" 2x10 2# 5\" 2x10   SA leg curl  44# x10 ecc L 44# x10   55# x10  ecc L 66# 1x10 ecc L 66# 1x10 ecc L 66# 2x10 ecc L      Mini squats  2x10 c UE 2x10 c " "UE 2x10 s UE 2x10 s UE 2x10 s UE  2x10 s UE 2x10 vc/tc's 2x10 2x10   Step Ups/Downs 1R x10 1R x10 2R x10 Lat down 1R x15 Lat down 1R x15 Lat down 1R x15 1R 1x10 1R  2x10 1R 2x10   Knee flexion step stretch Lo mat 10\"x10 nv     Biodex 10\"x10 Biodex  10\"x10 Biodex 10x10\"   Lunge stationary      C UE   x5 B C UE  X5 B C UE  2x5 B                  Ther Activity            Bike L1x6' fwd L1x6' L1x10' L1x10' L1x10' L1x10' 5 min fwd/bwd 5min fwd/bwd 5 min fwd/bwd               Gait Training                                    Modalities                                                                   "

## 2025-03-27 ENCOUNTER — OFFICE VISIT (OUTPATIENT)
Dept: PHYSICAL THERAPY | Facility: REHABILITATION | Age: 60
End: 2025-03-27
Payer: COMMERCIAL

## 2025-03-27 DIAGNOSIS — Z47.1 AFTERCARE FOLLOWING LEFT KNEE JOINT REPLACEMENT SURGERY: ICD-10-CM

## 2025-03-27 DIAGNOSIS — G89.29 CHRONIC PAIN OF LEFT KNEE: ICD-10-CM

## 2025-03-27 DIAGNOSIS — M25.562 CHRONIC PAIN OF LEFT KNEE: ICD-10-CM

## 2025-03-27 DIAGNOSIS — M17.12 PRIMARY OSTEOARTHRITIS OF LEFT KNEE: Primary | ICD-10-CM

## 2025-03-27 DIAGNOSIS — Z96.652 AFTERCARE FOLLOWING LEFT KNEE JOINT REPLACEMENT SURGERY: ICD-10-CM

## 2025-03-27 PROCEDURE — 97140 MANUAL THERAPY 1/> REGIONS: CPT | Performed by: PHYSICAL THERAPIST

## 2025-03-27 PROCEDURE — 97110 THERAPEUTIC EXERCISES: CPT | Performed by: PHYSICAL THERAPIST

## 2025-03-27 NOTE — PROGRESS NOTES
"Daily Note     Today's date: 3/27/2025  Patient name: Sg Gomez  : 1965  MRN: [id number]  Referring provider: Sherman Pierce,*  Dx:   Encounter Diagnosis     ICD-10-CM    1. Primary osteoarthritis of left knee  M17.12       2. Aftercare following left knee joint replacement surgery  Z47.1     Z96.652       3. Chronic pain of left knee  M25.562     G89.29                      Subjective: Pt comes to therapy noting he continues to have swelling in his knee and LE, particularly at the end of the work day. Reports he was doing a lot of stairs yesterday, which may have been the reason for an increase in swelling and stiffness.       Objective: See treatment diary below      Assessment: Tolerated treatment well. Continued challenge with current program. Minor cuing for terminal knee extension. Patient exhibited good technique with therapeutic exercises and would benefit from continued PT      Plan: Progress treatment as tolerated.       Precautions:   Patient Active Problem List   Diagnosis    Obstructive sleep apnea treated with continuous positive airway pressure (CPAP)    Obesity (BMI 35.0-39.9 without comorbidity)    Mixed hyperlipidemia    Gastroesophageal reflux disease    Hyperglycemia    Left knee pain    Chronic left shoulder pain    Primary osteoarthritis of left knee          Manuals 3/6 3/11 3/13 3/18 3/20 3/25 3/27     L Knee PROM JULIO CÉSAR JULIO CÉSAR JULIO CÉSAR JULIO CÉSAR TE KK JULIO CÉSAR                                         Neuro Re-Ed                                                            Ther Ex            Standing HR/TR D/C           TKE c TB            Standing Hip ext, abd, flx D/C           Prone quad strap stretch 10\"x10 30\"x3 np np        SL Abduction  5\"x10 5\"x10 np np        SLR - flex 5\"x10 5\"x10 5\"x10 5\"x10 5\"x10 5\"x10 5\" 2x10     Leg press   88# 2x10 110# x10  132# x10 132# 2x10 132# 2x10 132# 2x10     LAQ 7.5# x10 44# x10 ecc L 55# x10   66# x10  ecc L 66# 2x10 ecc L 66# 2x10 ecc L 66# 2x10 ecc L 66# 2x10 " "ecc L     SA leg curl  44# x10 ecc L 44# x10   55# x10  ecc L 66# 1x10 ecc L 66# 1x10 ecc L 66# 2x10 ecc L 66# 2x10 ecc L     Mini squats  2x10 c UE 2x10 c UE 2x10 s UE 2x10 s UE 2x10 s UE  2x10 s UE 2x10 s UE     Step Ups/Downs 1R x10 1R x10 2R x10 Lat down 1R x15 Lat down 1R x15 Lat down 1R x15 Lat down 1R x20     Knee flexion step stretch Lo mat 10\"x10 nv          Lunge stationary      C UE   x5 B C UE  X5 B C UE  2x5 B C UE 1x10 B                 Ther Activity            Bike L1x6' fwd L1x6' L1x10' L1x10' L1x10' L1x10' L1x10'                 Gait Training                                    Modalities                                                                     "

## 2025-03-31 DIAGNOSIS — Z47.1 AFTERCARE FOLLOWING LEFT KNEE JOINT REPLACEMENT SURGERY: Primary | ICD-10-CM

## 2025-03-31 DIAGNOSIS — Z96.652 AFTERCARE FOLLOWING LEFT KNEE JOINT REPLACEMENT SURGERY: Primary | ICD-10-CM

## 2025-04-01 ENCOUNTER — OFFICE VISIT (OUTPATIENT)
Dept: PHYSICAL THERAPY | Facility: REHABILITATION | Age: 60
End: 2025-04-01
Payer: COMMERCIAL

## 2025-04-01 DIAGNOSIS — M17.12 PRIMARY OSTEOARTHRITIS OF LEFT KNEE: Primary | ICD-10-CM

## 2025-04-01 DIAGNOSIS — Z47.1 AFTERCARE FOLLOWING LEFT KNEE JOINT REPLACEMENT SURGERY: ICD-10-CM

## 2025-04-01 DIAGNOSIS — Z96.652 AFTERCARE FOLLOWING LEFT KNEE JOINT REPLACEMENT SURGERY: ICD-10-CM

## 2025-04-01 DIAGNOSIS — M25.562 CHRONIC PAIN OF LEFT KNEE: ICD-10-CM

## 2025-04-01 DIAGNOSIS — G89.29 CHRONIC PAIN OF LEFT KNEE: ICD-10-CM

## 2025-04-01 PROCEDURE — 97110 THERAPEUTIC EXERCISES: CPT

## 2025-04-01 PROCEDURE — 97140 MANUAL THERAPY 1/> REGIONS: CPT

## 2025-04-01 PROCEDURE — 97530 THERAPEUTIC ACTIVITIES: CPT

## 2025-04-01 PROCEDURE — 97112 NEUROMUSCULAR REEDUCATION: CPT

## 2025-04-01 NOTE — PROGRESS NOTES
"Daily Note     Today's date: 2025  Patient name: Sg Gomez  : 1965  MRN: [id number]  Referring provider: Sherman Pierce,*  Dx:   Encounter Diagnosis     ICD-10-CM    1. Primary osteoarthritis of left knee  M17.12       2. Aftercare following left knee joint replacement surgery  Z47.1     Z96.652       3. Chronic pain of left knee  M25.562     G89.29                      Subjective: pt reports no significant problems with L knee, but continues with swelling which increases by the end of the day.      Objective: See treatment diary below      Assessment: pt tolerated treatment well. Appropriate level of challenge with exercises. Knee flexion ROM restrictions greater than extension. Patient demonstrated fatigue post treatment, exhibited good technique with therapeutic exercises, and would benefit from continued PT.      Plan: Continue per plan of care.  Progress treatment as tolerated.       Precautions:   Patient Active Problem List   Diagnosis    Obstructive sleep apnea treated with continuous positive airway pressure (CPAP)    Obesity (BMI 35.0-39.9 without comorbidity)    Mixed hyperlipidemia    Gastroesophageal reflux disease    Hyperglycemia    Left knee pain    Chronic left shoulder pain    Primary osteoarthritis of left knee          Manuals 3/6 3/11 3/13 3/18 3/20 3/25 3/27 4/1    L Knee PROM JULIO CÉSAR JULIO CÉSAR JULIO CÉSAR JULIO CÉSAR TE KK JULIO CÉSAR TE                                        Neuro Re-Ed                                                            Ther Ex            Standing HR/TR D/C           TKE c TB            Standing Hip ext, abd, flx D/C           Prone quad strap stretch 10\"x10 30\"x3 np np        SL Abduction  5\"x10 5\"x10 np np        SLR - flex 5\"x10 5\"x10 5\"x10 5\"x10 5\"x10 5\"x10 5\" 2x10 5\"2x10    Leg press   88# 2x10 110# x10  132# x10 132# 2x10 132# 2x10 132# 2x10 132# 2x10    LAQ 7.5# x10 44# x10 ecc L 55# x10   66# x10  ecc L 66# 2x10 ecc L 66# 2x10 ecc L 66# 2x10 ecc L 66# 2x10 ecc L 66# 2x10 ecc " "L    SA leg curl  44# x10 ecc L 44# x10   55# x10  ecc L 66# 1x10 ecc L 66# 1x10 ecc L 66# 2x10 ecc L 66# 2x10 ecc L 66# 2x10 ecc L    Mini squats  2x10 c UE 2x10 c UE 2x10 s UE 2x10 s UE 2x10 s UE  2x10 s UE 2x10 s UE 2x10 s UE    Step Ups/Downs 1R x10 1R x10 2R x10 Lat down 1R x15 Lat down 1R x15 Lat down 1R x15 Lat down 1R x20 Lat down 1R    Knee flexion step stretch Lo mat 10\"x10 nv          Lunge stationary      C UE   x5 B C UE  X5 B C UE  2x5 B C UE 1x10 B C UE 2x10 B                Ther Activity            Bike L1x6' fwd L1x6' L1x10' L1x10' L1x10' L1x10' L1x10' L1x10'                Gait Training                                    Modalities                                                                       "

## 2025-04-03 ENCOUNTER — OFFICE VISIT (OUTPATIENT)
Dept: PHYSICAL THERAPY | Facility: REHABILITATION | Age: 60
End: 2025-04-03
Payer: COMMERCIAL

## 2025-04-03 DIAGNOSIS — M17.12 PRIMARY OSTEOARTHRITIS OF LEFT KNEE: Primary | ICD-10-CM

## 2025-04-03 DIAGNOSIS — Z47.1 AFTERCARE FOLLOWING LEFT KNEE JOINT REPLACEMENT SURGERY: ICD-10-CM

## 2025-04-03 DIAGNOSIS — G89.29 CHRONIC PAIN OF LEFT KNEE: ICD-10-CM

## 2025-04-03 DIAGNOSIS — M25.562 CHRONIC PAIN OF LEFT KNEE: ICD-10-CM

## 2025-04-03 DIAGNOSIS — Z96.652 AFTERCARE FOLLOWING LEFT KNEE JOINT REPLACEMENT SURGERY: ICD-10-CM

## 2025-04-03 PROCEDURE — 97112 NEUROMUSCULAR REEDUCATION: CPT

## 2025-04-03 PROCEDURE — 97110 THERAPEUTIC EXERCISES: CPT

## 2025-04-03 PROCEDURE — 97140 MANUAL THERAPY 1/> REGIONS: CPT

## 2025-04-03 PROCEDURE — 97530 THERAPEUTIC ACTIVITIES: CPT

## 2025-04-03 NOTE — PROGRESS NOTES
"Daily Note     Today's date: 4/3/2025  Patient name: Sg Gomez  : 1965  MRN: [id number]  Referring provider: Sherman Pierce,*  Dx:   Encounter Diagnosis     ICD-10-CM    1. Primary osteoarthritis of left knee  M17.12       2. Aftercare following left knee joint replacement surgery  Z47.1     Z96.652       3. Chronic pain of left knee  M25.562     G89.29                      Subjective: pt reports soreness and discomfort yesterday for unknown reasons since he had woken up, but better today.       Objective: See treatment diary below      Assessment: pt tolerated treatment well. Increased dosage with leg press with good challenge noted. Good response with exercises with no adverse reactions. Patient demonstrated fatigue post treatment, exhibited good technique with therapeutic exercises, and would benefit from continued PT      Plan: Continue per plan of care.  Progress treatment as tolerated.       Precautions:   Patient Active Problem List   Diagnosis    Obstructive sleep apnea treated with continuous positive airway pressure (CPAP)    Obesity (BMI 35.0-39.9 without comorbidity)    Mixed hyperlipidemia    Gastroesophageal reflux disease    Hyperglycemia    Left knee pain    Chronic left shoulder pain    Primary osteoarthritis of left knee          Manuals 3/6 3/11 3/13 3/18 3/20 3/25 3/27 4/1 4/3   L Knee PROM JULIO CÉSAR JULIO CÉSAR JULIO CÉSAR JULIO CÉSAR TE KK JULIO CÉSAR TE TE                                       Neuro Re-Ed                                                            Ther Ex            Standing HR/TR D/C           TKE c TB            Standing Hip ext, abd, flx D/C           Prone quad strap stretch 10\"x10 30\"x3 np np        SL Abduction  5\"x10 5\"x10 np np        SLR - flex 5\"x10 5\"x10 5\"x10 5\"x10 5\"x10 5\"x10 5\" 2x10 5\"2x10 5\" 2x10   Leg press   88# 2x10 110# x10  132# x10 132# 2x10 132# 2x10 132# 2x10 132# 2x10 132#  3x10   LAQ 7.5# x10 44# x10 ecc L 55# x10   66# x10  ecc L 66# 2x10 ecc L 66# 2x10 ecc L 66# 2x10 ecc L " "66# 2x10 ecc L 66# 2x10 ecc L 66# 2x10 ecc L   SA leg curl  44# x10 ecc L 44# x10   55# x10  ecc L 66# 1x10 ecc L 66# 1x10 ecc L 66# 2x10 ecc L 66# 2x10 ecc L 66# 2x10 ecc L 66# 2x10 ecc L   Mini squats  2x10 c UE 2x10 c UE 2x10 s UE 2x10 s UE 2x10 s UE  2x10 s UE 2x10 s UE 2x10 s UE 2x10 s UE   Step Ups/Downs 1R x10 1R x10 2R x10 Lat down 1R x15 Lat down 1R x15 Lat down 1R x15 Lat down 1R x20 Lat down 1R Lat down 1R x20   Knee flexion step stretch Lo mat 10\"x10 nv          Lunge stationary      C UE   x5 B C UE  X5 B C UE  2x5 B C UE 1x10 B C UE 2x10 B C UE 2x10 B               Ther Activity            Bike L1x6' fwd L1x6' L1x10' L1x10' L1x10' L1x10' L1x10' L1x10' L1x10'               Gait Training                                    Modalities                                                                         "

## 2025-04-08 ENCOUNTER — OFFICE VISIT (OUTPATIENT)
Dept: PHYSICAL THERAPY | Facility: REHABILITATION | Age: 60
End: 2025-04-08
Payer: COMMERCIAL

## 2025-04-08 DIAGNOSIS — M25.562 CHRONIC PAIN OF LEFT KNEE: ICD-10-CM

## 2025-04-08 DIAGNOSIS — Z96.652 AFTERCARE FOLLOWING LEFT KNEE JOINT REPLACEMENT SURGERY: ICD-10-CM

## 2025-04-08 DIAGNOSIS — G89.29 CHRONIC PAIN OF LEFT KNEE: ICD-10-CM

## 2025-04-08 DIAGNOSIS — Z47.1 AFTERCARE FOLLOWING LEFT KNEE JOINT REPLACEMENT SURGERY: ICD-10-CM

## 2025-04-08 DIAGNOSIS — M17.12 PRIMARY OSTEOARTHRITIS OF LEFT KNEE: Primary | ICD-10-CM

## 2025-04-08 PROCEDURE — 97110 THERAPEUTIC EXERCISES: CPT | Performed by: PHYSICAL THERAPIST

## 2025-04-08 PROCEDURE — 97140 MANUAL THERAPY 1/> REGIONS: CPT | Performed by: PHYSICAL THERAPIST

## 2025-04-08 NOTE — PROGRESS NOTES
"Daily Note     Today's date: 2025  Patient name: Sg Gomez  : 1965  MRN: [id number]  Referring provider: Sherman Pierce,*  Dx:   Encounter Diagnosis     ICD-10-CM    1. Primary osteoarthritis of left knee  M17.12       2. Aftercare following left knee joint replacement surgery  Z47.1     Z96.652       3. Chronic pain of left knee  M25.562     G89.29                      Subjective: Pt comes to therapy noting continued swelling in his operative LE, especially at night, resulting in difficulty finding comfortable sleeping position. Notes he elevates his LE for swelling management.       Objective: See treatment diary below      Assessment: Tolerated treatment well. Challenged with progressions of PREs, with notable but appropriate fatigue. Patient exhibited good technique with therapeutic exercises and would benefit from continued PT      Plan: Progress treatment as tolerated.       Precautions:   Patient Active Problem List   Diagnosis    Obstructive sleep apnea treated with continuous positive airway pressure (CPAP)    Obesity (BMI 35.0-39.9 without comorbidity)    Mixed hyperlipidemia    Gastroesophageal reflux disease    Hyperglycemia    Left knee pain    Chronic left shoulder pain    Primary osteoarthritis of left knee          Manuals 4/8   3/18 3/20 3/25 3/27 4/1 4/3   L Knee PROM JULIO CÉSAR   JULIO CÉSAR TE KK JULIO CÉSAR TE TE                                       Neuro Re-Ed                                                            Ther Ex            SLR - flex 2# 5\" 2x10   5\"x10 5\"x10 5\"x10 5\" 2x10 5\"2x10 5\" 2x10   Leg press 154# 2x10 176# 2x10   110# x10  132# x10 132# 2x10 132# 2x10 132# 2x10 132# 2x10 132#  3x10   LAQ 77# 2x10 ecc L   66# 2x10 ecc L 66# 2x10 ecc L 66# 2x10 ecc L 66# 2x10 ecc L 66# 2x10 ecc L 66# 2x10 ecc L   SA leg curl 77# 2x10 ecc L   66# 1x10 ecc L 66# 1x10 ecc L 66# 2x10 ecc L 66# 2x10 ecc L 66# 2x10 ecc L 66# 2x10 ecc L   Mini squats  Rocker 2x10   2x10 s UE 2x10 s UE  2x10 s UE 2x10 " s UE 2x10 s UE 2x10 s UE   Step Ups/Downs Lat down 1R x20   Lat down 1R x15 Lat down 1R x15 Lat down 1R x15 Lat down 1R x20 Lat down 1R Lat down 1R x20   Knee flexion step stretch            Lunge stationary   C UE 2x10 B   C UE   x5 B C UE  X5 B C UE  2x5 B C UE 1x10 B C UE 2x10 B C UE 2x10 B               Ther Activity            Bike L1x10'   L1x10' L1x10' L1x10' L1x10' L1x10' L1x10'               Gait Training                                    Modalities

## 2025-04-10 ENCOUNTER — OFFICE VISIT (OUTPATIENT)
Dept: PHYSICAL THERAPY | Facility: REHABILITATION | Age: 60
End: 2025-04-10
Payer: COMMERCIAL

## 2025-04-10 DIAGNOSIS — Z47.1 AFTERCARE FOLLOWING LEFT KNEE JOINT REPLACEMENT SURGERY: ICD-10-CM

## 2025-04-10 DIAGNOSIS — Z96.652 AFTERCARE FOLLOWING LEFT KNEE JOINT REPLACEMENT SURGERY: ICD-10-CM

## 2025-04-10 DIAGNOSIS — G89.29 CHRONIC PAIN OF LEFT KNEE: ICD-10-CM

## 2025-04-10 DIAGNOSIS — M25.562 CHRONIC PAIN OF LEFT KNEE: ICD-10-CM

## 2025-04-10 DIAGNOSIS — M17.12 PRIMARY OSTEOARTHRITIS OF LEFT KNEE: Primary | ICD-10-CM

## 2025-04-10 PROCEDURE — 97140 MANUAL THERAPY 1/> REGIONS: CPT

## 2025-04-10 PROCEDURE — 97110 THERAPEUTIC EXERCISES: CPT

## 2025-04-10 PROCEDURE — 97530 THERAPEUTIC ACTIVITIES: CPT

## 2025-04-10 NOTE — PROGRESS NOTES
"Daily Note     Today's date: 4/10/2025  Patient name: Sg Gomez  : 1965  MRN: [id number]  Referring provider: Sherman Pierce,*  Dx:   Encounter Diagnosis     ICD-10-CM    1. Primary osteoarthritis of left knee  M17.12       2. Aftercare following left knee joint replacement surgery  Z47.1     Z96.652       3. Chronic pain of left knee  M25.562     G89.29                      Subjective: Pt reports continued swelling in his operative LE, especially at night, resulting in difficulty finding comfortable sleeping position. He noted he has tingling in B feet as well. He returns to surgeon on Tuesday next week.      Objective: See treatment diary below      Assessment: pt tolerated treatment well. Challenged with progressions of PREs, with notable but appropriate fatigue. Patient exhibited good technique with therapeutic exercises and would benefit from continued PT      Plan: Progress treatment as tolerated.       Precautions:   Patient Active Problem List   Diagnosis    Obstructive sleep apnea treated with continuous positive airway pressure (CPAP)    Obesity (BMI 35.0-39.9 without comorbidity)    Mixed hyperlipidemia    Gastroesophageal reflux disease    Hyperglycemia    Left knee pain    Chronic left shoulder pain    Primary osteoarthritis of left knee          Manuals 4/8 4/10  3/18 3/20 3/25 3/27 4/1 4/3   L Knee PROM JULIO CÉSAR TE  JULIO CÉSAR TE KK JULIO CÉSAR TE TE                                       Neuro Re-Ed                                                            Ther Ex            SLR - flex 2# 5\" 2x10 & abd  2# 2x10  5\"x10 5\"x10 5\"x10 5\" 2x10 5\"2x10 5\" 2x10   Leg press 154# 2x10 176# 2x10 176#  2x10  110# x10  132# x10 132# 2x10 132# 2x10 132# 2x10 132# 2x10 132#  3x10   LAQ 77# 2x10 ecc L 77# 2x10 ecc L  66# 2x10 ecc L 66# 2x10 ecc L 66# 2x10 ecc L 66# 2x10 ecc L 66# 2x10 ecc L 66# 2x10 ecc L   SA leg curl 77# 2x10 ecc L 77# 2x10 ecc L  66# 1x10 ecc L 66# 1x10 ecc L 66# 2x10 ecc L 66# 2x10 ecc L 66# 2x10 " ecc L 66# 2x10 ecc L   Mini squats  Rocker 2x10 Rocker 2x10  2x10 s UE 2x10 s UE  2x10 s UE 2x10 s UE 2x10 s UE 2x10 s UE   Step Ups/Downs Lat down 1R x20 Lat down 1R x20  Lat down 1R x15 Lat down 1R x15 Lat down 1R x15 Lat down 1R x20 Lat down 1R Lat down 1R x20   Knee flexion step stretch            Lunge stationary   C UE 2x10 B C UE 2x10 B  C UE   x5 B C UE  X5 B C UE  2x5 B C UE 1x10 B C UE 2x10 B C UE 2x10 B               Ther Activity            Bike L1x10' L1x10'  L1x10' L1x10' L1x10' L1x10' L1x10' L1x10'               Gait Training                                    Modalities

## 2025-04-15 ENCOUNTER — OFFICE VISIT (OUTPATIENT)
Dept: PHYSICAL THERAPY | Facility: REHABILITATION | Age: 60
End: 2025-04-15
Payer: COMMERCIAL

## 2025-04-15 ENCOUNTER — HOSPITAL ENCOUNTER (OUTPATIENT)
Dept: RADIOLOGY | Facility: HOSPITAL | Age: 60
Discharge: HOME | End: 2025-04-15
Attending: ORTHOPAEDIC SURGERY
Payer: COMMERCIAL

## 2025-04-15 VITALS — BODY MASS INDEX: 34.7 KG/M2 | HEIGHT: 73 IN

## 2025-04-15 DIAGNOSIS — Z47.1 AFTERCARE FOLLOWING LEFT KNEE JOINT REPLACEMENT SURGERY: ICD-10-CM

## 2025-04-15 DIAGNOSIS — Z96.652 AFTERCARE FOLLOWING LEFT KNEE JOINT REPLACEMENT SURGERY: ICD-10-CM

## 2025-04-15 DIAGNOSIS — M25.562 CHRONIC PAIN OF LEFT KNEE: ICD-10-CM

## 2025-04-15 DIAGNOSIS — Z47.89 ORTHOPEDIC AFTERCARE: Primary | ICD-10-CM

## 2025-04-15 DIAGNOSIS — M17.12 PRIMARY OSTEOARTHRITIS OF LEFT KNEE: Primary | ICD-10-CM

## 2025-04-15 DIAGNOSIS — G89.29 CHRONIC PAIN OF LEFT KNEE: ICD-10-CM

## 2025-04-15 PROCEDURE — 97140 MANUAL THERAPY 1/> REGIONS: CPT | Performed by: PHYSICAL THERAPIST

## 2025-04-15 PROCEDURE — 73560 X-RAY EXAM OF KNEE 1 OR 2: CPT

## 2025-04-15 PROCEDURE — 99024 POSTOP FOLLOW-UP VISIT: CPT | Performed by: ORTHOPAEDIC SURGERY

## 2025-04-15 PROCEDURE — 97110 THERAPEUTIC EXERCISES: CPT | Performed by: PHYSICAL THERAPIST

## 2025-04-15 NOTE — PROGRESS NOTES
"Name: gS Gomez      : 1965       MRN: [id number]   Encounter Provider: Sherman Pirece MD   Encounter Date: 04/15/25  Encounter department: [facility] [place]     ASSESSMENT & PLAN:  Assessment & Plan  Orthopedic aftercare  3 months following left total knee replacement, this patient has made improvements in motion and reduction in pain.  He will benefit from home v exercises to increase flexion.  So far as his night pain, I recommend occasional anti-inflammatory ministration.  Dental antibiotic prophylaxis is discussed.  Office follow-up in 3 months time with x-rays he was left knee upon arrival be appropriate for his 6-month checkup            To do next visit:  No follow-ups on file.    _____________________________________________________  CHIEF COMPLAINT:  Chief Complaint   Patient presents with    Left Knee - Post-op         SUBJECTIVE:  Sg Gomez is a 60 y.o. male who presents 3 months following left total knee replacement.  He describes swelling in his left leg, occasional paresthesias in his left foot, as well as medial sided left proximal tibial pain        PAST MEDICAL HISTORY:  Past Medical History:   Diagnosis Date    CPAP (continuous positive airway pressure) dependence     GERD (gastroesophageal reflux disease)     High cholesterol     Knee pain, left     Obstructive sleep apnea     Last Assessed:16       PAST SURGICAL HISTORY:  Past Surgical History:   Procedure Laterality Date    COLONOSCOPY      KNEE SURGERY Bilateral     Bursa sacs removed bilaterally    CT ARTHRP KNE CONDYLE&PLATU MEDIAL&LAT COMPARTMENTS Left 2025    Procedure: ARTHROPLASTY KNEE TOTAL W ROBOT;  Surgeon: Sherman Pierce MD;  Location: [facility];  Service: Orthopedics    SURGERY SCROTAL / TESTICULAR      25 per pt as a child had to have his testicles dropped \"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 \"       FAMILY HISTORY:  Family History   Problem Relation Age of " "Onset    Hypertension Mother     Lung cancer Father     Anesthesia problems Son        SOCIAL HISTORY:  Social History     Tobacco Use    Smoking status: Never    Smokeless tobacco: Never    Tobacco comments:     No smoking per pt - denies any tobacco use per pt    Vaping Use    Vaping status: Never Used   Substance Use Topics    Alcohol use: Yes     Comment: Beer use - weekly    Drug use: No     Comment: Denies any drug use per pt       MEDICATIONS:    Current Outpatient Medications:     Acetaminophen Extra Strength 500 MG TABS, TAKE 2 TABLETS (1,000 MG TOTAL) BY MOUTH EVERY 6 (SIX) HOURS AS NEEDED FOR MILD PAIN, Disp: 90 tablet, Rfl: 0    atorvastatin (LIPITOR) 20 mg tablet, Take 1 tablet (20 mg total) by mouth daily, Disp: 90 tablet, Rfl: 1    celecoxib (CeleBREX) 200 mg capsule, Take 1 capsule (200 mg total) by mouth 2 (two) times a day (Patient not taking: Reported on 4/15/2025), Disp: 60 capsule, Rfl: 2    Cholecalciferol (VITAMIN D-3 PO), Take by mouth in the morning, Disp: , Rfl:     MAGNESIUM PO, Take by mouth in the morning, Disp: , Rfl:     Multiple Vitamins-Minerals (multivitamin with minerals) tablet, Take 1 tablet by mouth daily, Disp: 30 tablet, Rfl: 2    Multiple Vitamins-Minerals (ZINC PO), Take by mouth, Disp: , Rfl:     omeprazole (PriLOSEC) 20 mg delayed release capsule, Take 1 capsule (20 mg total) by mouth daily, Disp: 90 capsule, Rfl: 1    POTASSIUM PO, Take by mouth in the morning, Disp: , Rfl:     TURMERIC PO, Take by mouth in the morning, Disp: , Rfl:     ALLERGIES:  No Known Allergies    LABS:  HgA1c:   Lab Results   Component Value Date    HGBA1C 5.6 12/27/2024     BMP:   Lab Results   Component Value Date    CALCIUM 8.9 01/21/2025    K 4.5 01/21/2025    CO2 28 01/21/2025     01/21/2025    BUN 14 01/21/2025    CREATININE 0.81 01/21/2025     CBC: No components found for: \"CBC\"    _____________________________________________________  PHYSICAL EXAMINATION:  Vital signs: Ht 6' 1\" " (1.854 m)   BMI 34.70 kg/m²   General: No acute distress, awake and alert  Psychiatric: Mood and affect appear appropriate  HEENT: Trachea Midline, No torticollis, no apparent facial trauma  Cardiovascular: No audible murmurs; Extremities appear perfused  Pulmonary: No audible wheezing or stridor  Skin: No open lesions; see further details (if any) below    MUSCULOSKELETAL EXAMINATION:  Ortho Exam  Gait patterns are strong accomplished rise.  Left hip moves well.  Left knee is healed anterior scar.  There is moderate swelling but no warmth.  He has extension is full and flexes to 95 or 100 degrees.  There is no tenderness in left calf    ___________________________________________________  STUDIES REVIEWED:  I personally reviewed the images obtained in office today and my independent interpretation is as follows:    I have personally reviewed 2 views of the left knee from today my interpretation is as follows:  2 views left knee show total knee replacement in satisfactory position      PROCEDURES PERFORMED:    Procedures    None preformed       Sherman Pierce MD

## 2025-04-15 NOTE — ASSESSMENT & PLAN NOTE
3 months following left total knee replacement, this patient has made improvements in motion and reduction in pain.  He will benefit from home v exercises to increase flexion.  So far as his night pain, I recommend occasional anti-inflammatory ministration.  Dental antibiotic prophylaxis is discussed.  Office follow-up in 3 months time with x-rays he was left knee upon arrival be appropriate for his 6-month checkup

## 2025-04-15 NOTE — PROGRESS NOTES
"Daily Note     Today's date: 4/15/2025  Patient name: Sg Gomez  : 1965  MRN: [id number]  Referring provider: Sherman Pierce,*  Dx:   Encounter Diagnosis     ICD-10-CM    1. Primary osteoarthritis of left knee  M17.12       2. Aftercare following left knee joint replacement surgery  Z47.1     Z96.652       3. Chronic pain of left knee  M25.562     G89.29                      Subjective: Pt comes to therapy stating he had a follow up with the orthopedic physician this morning, noting he was told that his knee rehabilitation was right on track with typical timeline. States he continues to have medial knee/calf pain and tingling in his feet. Reports continued restlessness at night too. However, reports he was cleared to discontinue PT and continue via HEP. Pt states he has noticed some improvements in his flexibility since starting knee bending on steps 1-2 sessions ago.       Objective: See treatment diary below      Assessment: Tolerated treatment well. Patient exhibited good technique with therapeutic exercises      Plan: Patient to be discharged at present time to [facility] due to meeting goals.      Precautions:   Patient Active Problem List   Diagnosis    Obstructive sleep apnea treated with continuous positive airway pressure (CPAP)    Obesity (BMI 35.0-39.9 without comorbidity)    Mixed hyperlipidemia    Gastroesophageal reflux disease    Hyperglycemia    Left knee pain    Chronic left shoulder pain    Primary osteoarthritis of left knee          Manuals 4/8 4/10 4/15   3/25 3/27 4/1 4/3   L Knee PROM JULIO CÉSAR TE JULIO CÉSAR   KK JULIO CÉSAR TE TE                                       Neuro Re-Ed                                                            Ther Ex            SLR - flex 2# 5\" 2x10 & abd  2# 2x10 np   5\"x10 5\" 2x10 5\"2x10 5\" 2x10   90/90 nn glides   3x10         Leg press 154# 2x10 176# 2x10 176#  2x10 176# 3x10   132# 2x10 132# 2x10 132# 2x10 132#  3x10   LAQ 77# 2x10 ecc L 77# 2x10 ecc L 77# 2x10 ecc L   " 66# 2x10 ecc L 66# 2x10 ecc L 66# 2x10 ecc L 66# 2x10 ecc L   SA leg curl 77# 2x10 ecc L 77# 2x10 ecc L 77# 2x10 ecc L   66# 2x10 ecc L 66# 2x10 ecc L 66# 2x10 ecc L 66# 2x10 ecc L   Mini squats  Rocker 2x10 Rocker 2x10 np    2x10 s UE 2x10 s UE 2x10 s UE 2x10 s UE   Step Ups/Downs Lat down 1R x20 Lat down 1R x20 np   Lat down 1R x15 Lat down 1R x20 Lat down 1R Lat down 1R x20   Knee flexion step stretch            Lunge stationary   C UE 2x10 B C UE 2x10 B np   C UE  2x5 B C UE 1x10 B C UE 2x10 B C UE 2x10 B               Ther Activity            Bike L1x10' L1x10' L1x10'   L1x10' L1x10' L1x10' L1x10'               Gait Training                                    Modalities                                      Access Code: [email or web address]  URL: [email or web address]  Date: 04/15/2025  Prepared by: Taz Arroyo    Exercises  - Supine Sciatic Nerve Glide  - 1 x daily - 2 sets - 10 reps - 5 hold  - Active Straight Leg Raise with Quad Set  - 3 x weekly - 2 sets - 10 reps - 5 hold  - Sidelying Hip Abduction  - 3 x weekly - 2 sets - 10 reps - 5 hold  - Supine Bridge  - 3 x weekly - 2 sets - 10 reps - 5 hold  - Mini Squat  - 3 x weekly - 2 sets - 10 reps - 5 hold  - Forward Lunge with Counter Support  - 3 x weekly - 2 sets - 10 reps - 5 hold  - Standing Knee Flexion Stretch on Step  - 1 x daily - 10 reps - 10 hold

## 2025-07-01 DIAGNOSIS — Z96.652 AFTERCARE FOLLOWING LEFT KNEE JOINT REPLACEMENT SURGERY: Primary | ICD-10-CM

## 2025-07-01 DIAGNOSIS — Z47.1 AFTERCARE FOLLOWING LEFT KNEE JOINT REPLACEMENT SURGERY: Primary | ICD-10-CM

## 2025-07-15 ENCOUNTER — HOSPITAL ENCOUNTER (OUTPATIENT)
Dept: RADIOLOGY | Facility: HOSPITAL | Age: 60
Discharge: HOME | End: 2025-07-15
Attending: ORTHOPAEDIC SURGERY
Payer: COMMERCIAL

## 2025-07-15 VITALS — HEIGHT: 73 IN | WEIGHT: 256 LBS | BODY MASS INDEX: 33.93 KG/M2

## 2025-07-15 DIAGNOSIS — Z47.1 AFTERCARE FOLLOWING LEFT KNEE JOINT REPLACEMENT SURGERY: ICD-10-CM

## 2025-07-15 DIAGNOSIS — Z47.1 AFTERCARE FOLLOWING LEFT KNEE JOINT REPLACEMENT SURGERY: Primary | ICD-10-CM

## 2025-07-15 DIAGNOSIS — Z96.652 AFTERCARE FOLLOWING LEFT KNEE JOINT REPLACEMENT SURGERY: Primary | ICD-10-CM

## 2025-07-15 DIAGNOSIS — Z96.652 AFTERCARE FOLLOWING LEFT KNEE JOINT REPLACEMENT SURGERY: ICD-10-CM

## 2025-07-15 PROCEDURE — 99213 OFFICE O/P EST LOW 20 MIN: CPT | Performed by: ORTHOPAEDIC SURGERY

## 2025-07-15 PROCEDURE — 73560 X-RAY EXAM OF KNEE 1 OR 2: CPT

## (undated) DEVICE — ABDOMINAL PAD: Brand: DERMACEA

## (undated) DEVICE — BLADE SAW RECIP 12.5 X 76MM 0.9/0.9MM THCK

## (undated) DEVICE — VELYS SATEL DRAPE

## (undated) DEVICE — SUT VICRYL PLUS 2-0 CTB-1 27 IN VCPB259H

## (undated) DEVICE — PLUMEPEN PRO 10FT

## (undated) DEVICE — DRAPE EQUIPMENT RF WAND

## (undated) DEVICE — GLOVE INDICATOR PI UNDERGLOVE SZ 7 BLUE

## (undated) DEVICE — CAPIT KNEE ATTUNE FB W/DOM AOX

## (undated) DEVICE — PAD CAST 6 IN COTTON NON STERILE

## (undated) DEVICE — HANDPIECE SET WITH RETRACTABLE COAXIAL FAN SPRAY TIP AND SUCTION TUBE: Brand: INTERPULSE

## (undated) DEVICE — TRAY FOLEY 16FR URIMETER SILICONE SURESTEP

## (undated) DEVICE — GLOVE SRG BIOGEL 7.5

## (undated) DEVICE — 3 BONE CEMENT MIXER: Brand: MIXEVAC

## (undated) DEVICE — SAW BLADE OSCILLATING BRAZOL 167

## (undated) DEVICE — VELYS ROBOT DRAPE

## (undated) DEVICE — GLOVE SRG BIOGEL 6.5

## (undated) DEVICE — GAUZE SPONGES,16 PLY: Brand: CURITY

## (undated) DEVICE — ACE WRAP 6 IN UNSTERILE

## (undated) DEVICE — GLOVE INDICATOR PI UNDERGLOVE SZ 7.5 BLUE

## (undated) DEVICE — HOOD: Brand: T7PLUS

## (undated) DEVICE — STIRRUP STRAP ADULT DISP

## (undated) DEVICE — SUT VICRYL PLUS 1 CTB-1 36 IN VCPB947H

## (undated) DEVICE — NO-SCRATCH ™ SMALL WHITNEY CURETTE ™ IS A SINGLE-USE, PLASTIC CURETTE FOR QUICKLY APPLYING, MANIPULATING AND REMOVING BONE CEMENT DURING HIP AND KNEE REPLACEMENT SURGERY. THE PLASTIC IS SOFTER THAN STEEL INSTRUMENTS, REDUCING THE RISK OF DAMAGING THE PROSTHESIS WITH METAL INSTRUMENTS.  THE CURETTE’S 6MM TIP REMOVES EXCESS CEMENT FROM REPLACEMENT HIPS AND KNEES. EASY-TO-MANEUVER, THE SMALL BLUE CURETTE LETS YOU REMOVE CEMENT FROM ALL EDGES OF THE PROSTHESIS.NO-SCRATCH WHITNEY SMALL CURETTE FEATURES:SAFER THAN STEEL- MADE OF PLASTIC - STURDY YET SOFTER THAN SURGICAL STEEL.HANDIER- EACH TOOL HAS A MOLDED-IN THUMB INDENTATION INSTANTLY ORIENTING THE TOOL.- EASIER TO MANEUVER IN HARD TO SEE PLACES.- COLOR-CODED FOR EASY IDENTIFICATION.FASTER- COMES INDIVIDUALLY PACKAGED IN STERILE, PEEL OPEN POUCH, READY TO GO.- APPLIES, MANIPULATES, OR REMOVES CEMENT WITH FINGERTIP PRECISION.ECONOMICAL- THE COST OF A SINGLE REVISION DWARFS THE COST OF A SINGLE-USE CURETTE. - DISPOSABLE – THERE’S NO NEED TO WASTE TIME REMOVING HARDENED CEMENT OR RE-STERILIZING TOOLS.- LESS EXPENSIVE TO BUY AND INVENTORY - ORDER ONLY THE TOOL YOU USE.- PACKAGED 25 INDIVIDUALLY WRAPPED TOOLS TO A CARTON FOR CONVENIENT SHELF STORAGE.: Brand: WHITNEY NO-SCRATCH CURETTE (SMALL)

## (undated) DEVICE — SILVER-COATED ANTIMICROBIAL BARRIER DRESSING: Brand: ACTICOAT   4" X 8"

## (undated) DEVICE — WET SKIN PREP TRAY: Brand: MEDLINE INDUSTRIES, INC.

## (undated) DEVICE — VELYS ROBOTIC-ASSISTED SOLUTION ARRAY SET - KNEE: Brand: VELYS

## (undated) DEVICE — BETHLEHEM UNIV TOTAL KNEE, KIT: Brand: CARDINAL HEALTH

## (undated) DEVICE — STOCKINETTE REGULAR

## (undated) DEVICE — GLOVE INDICATOR PI UNDERGLOVE SZ 8.5 BLUE

## (undated) DEVICE — VELYS BLADE SAW OSC 85 X 19 X 2MM

## (undated) DEVICE — JP 3-SPRING RES W/10FR PVC DRAIN/TR: Brand: CARDINAL HEALTH

## (undated) DEVICE — 3M™ IOBAN™ 2 ANTIMICROBIAL INCISE DRAPE 6650EZ: Brand: IOBAN™ 2

## (undated) DEVICE — VELYS ROBOT-ASSISTED SOLUTION ARRAY DRILL PIN 125 MM X 4 MM: Brand: VELYS

## (undated) DEVICE — ACE WRAP 6 IN STERILE

## (undated) DEVICE — PADDING CAST 4 IN  COTTON STRL

## (undated) DEVICE — HOOD WITH PEEL AWAY FACE SHIELD: Brand: T7PLUS

## (undated) DEVICE — GLOVE SRG BIOGEL 8

## (undated) DEVICE — ASTOUND STANDARD SURGICAL GOWN, XL: Brand: CONVERTORS

## (undated) DEVICE — BAG POLY CLEAR 12 X 8 X 30